# Patient Record
Sex: FEMALE | Race: WHITE
[De-identification: names, ages, dates, MRNs, and addresses within clinical notes are randomized per-mention and may not be internally consistent; named-entity substitution may affect disease eponyms.]

---

## 2017-02-07 ENCOUNTER — PRIOR ORIGINAL RECORDS (OUTPATIENT)
Dept: OTHER | Age: 72
End: 2017-02-07

## 2017-02-14 ENCOUNTER — HOSPITAL ENCOUNTER (OUTPATIENT)
Dept: CV DIAGNOSTICS | Facility: HOSPITAL | Age: 72
Discharge: HOME | End: 2017-02-14
Attending: INTERNAL MEDICINE
Payer: MEDICARE

## 2017-02-14 DIAGNOSIS — R07.9 CHEST PAIN: ICD-10-CM

## 2017-02-14 DIAGNOSIS — I25.10 CAD (CORONARY ARTERY DISEASE): ICD-10-CM

## 2017-02-14 PROCEDURE — 93016 CV STRESS TEST SUPVJ ONLY: CPT | Performed by: INTERNAL MEDICINE

## 2017-02-14 PROCEDURE — 93017 CV STRESS TEST TRACING ONLY: CPT

## 2017-02-14 PROCEDURE — 93350 STRESS TTE ONLY: CPT

## 2017-02-14 PROCEDURE — 93018 CV STRESS TEST I&R ONLY: CPT | Performed by: INTERNAL MEDICINE

## 2017-02-14 PROCEDURE — 93350 STRESS TTE ONLY: CPT | Performed by: INTERNAL MEDICINE

## 2017-02-14 RX ORDER — DOBUTAMINE HYDROCHLORIDE 100 MG/100ML
INJECTION INTRAVENOUS
Status: COMPLETED
Start: 2017-02-14 | End: 2017-02-14

## 2017-02-14 RX ORDER — SODIUM CHLORIDE 9 MG/ML
INJECTION, SOLUTION INTRAVENOUS
Status: COMPLETED
Start: 2017-02-14 | End: 2017-02-14

## 2017-02-14 RX ORDER — 0.9 % SODIUM CHLORIDE 0.9 %
VIAL (ML) INJECTION
Status: COMPLETED
Start: 2017-02-14 | End: 2017-02-14

## 2017-02-14 RX ADMIN — SODIUM CHLORIDE 100 ML: 9 INJECTION, SOLUTION INTRAVENOUS at 14:00:00

## 2017-02-14 RX ADMIN — 0.9 % SODIUM CHLORIDE 100 ML: 0.9 % VIAL (ML) INJECTION at 14:00:00

## 2017-02-14 RX ADMIN — DOBUTAMINE HYDROCHLORIDE 20 MCG/KG/MIN: 100 INJECTION INTRAVENOUS at 14:00:00

## 2017-02-14 NOTE — IMAGING NOTE
3471 - Patient here in cardiac diagnostics. Underwent dobutamine stress echo. Patient became hypotensive in early recovery, SBP 70s. IV bolus 0.9NS given to patient, placed patient in trendelenburg. Patient never lost consiousness.   B/P increased, see stre

## 2017-02-21 ENCOUNTER — PRIOR ORIGINAL RECORDS (OUTPATIENT)
Dept: OTHER | Age: 72
End: 2017-02-21

## 2017-03-13 ENCOUNTER — HOSPITAL ENCOUNTER (OUTPATIENT)
Dept: MAMMOGRAPHY | Age: 72
Discharge: HOME | End: 2017-03-13
Attending: INTERNAL MEDICINE
Payer: MEDICARE

## 2017-03-13 DIAGNOSIS — Z12.31 VISIT FOR SCREENING MAMMOGRAM: ICD-10-CM

## 2017-03-13 PROCEDURE — 77067 SCR MAMMO BI INCL CAD: CPT

## 2017-08-09 ENCOUNTER — PRIOR ORIGINAL RECORDS (OUTPATIENT)
Dept: OTHER | Age: 72
End: 2017-08-09

## 2018-01-11 ENCOUNTER — HOSPITAL ENCOUNTER (OUTPATIENT)
Dept: MAMMOGRAPHY | Age: 73
Discharge: HOME | End: 2018-01-11
Attending: INTERNAL MEDICINE
Payer: MEDICARE

## 2018-01-11 DIAGNOSIS — Z12.39 SCREENING BREAST EXAMINATION: ICD-10-CM

## 2018-02-15 ENCOUNTER — PRIOR ORIGINAL RECORDS (OUTPATIENT)
Dept: OTHER | Age: 73
End: 2018-02-15

## 2018-02-20 ENCOUNTER — LAB ENCOUNTER (OUTPATIENT)
Dept: LAB | Age: 73
End: 2018-02-20
Attending: INTERNAL MEDICINE
Payer: MEDICARE

## 2018-02-20 ENCOUNTER — PRIOR ORIGINAL RECORDS (OUTPATIENT)
Dept: OTHER | Age: 73
End: 2018-02-20

## 2018-02-20 DIAGNOSIS — E78.00 PURE HYPERCHOLESTEROLEMIA: Primary | ICD-10-CM

## 2018-02-20 LAB
ALT SERPL-CCNC: 18 U/L (ref 14–54)
AST SERPL-CCNC: 19 U/L (ref 15–41)
CHOLEST SERPL-MCNC: 146 MG/DL (ref 110–200)
HDLC SERPL-MCNC: 43 MG/DL
LDLC SERPL CALC-MCNC: 86 MG/DL (ref 0–99)
NONHDLC SERPL-MCNC: 103 MG/DL
TRIGL SERPL-MCNC: 87 MG/DL (ref 1–149)

## 2018-02-20 PROCEDURE — 80061 LIPID PANEL: CPT

## 2018-02-20 PROCEDURE — 36415 COLL VENOUS BLD VENIPUNCTURE: CPT

## 2018-02-20 PROCEDURE — 84450 TRANSFERASE (AST) (SGOT): CPT

## 2018-02-20 PROCEDURE — 84460 ALANINE AMINO (ALT) (SGPT): CPT

## 2018-02-21 LAB
ALT (SGPT): 18 U/L
AST (SGOT): 19 U/L
CHOLESTEROL, TOTAL: 146 MG/DL
HDL CHOLESTEROL: 43 MG/DL
LDL CHOLESTEROL: 86 MG/DL
TRIGLYCERIDES: 87 MG/DL

## 2018-02-28 ENCOUNTER — PRIOR ORIGINAL RECORDS (OUTPATIENT)
Dept: OTHER | Age: 73
End: 2018-02-28

## 2018-03-14 ENCOUNTER — HOSPITAL ENCOUNTER (OUTPATIENT)
Dept: MAMMOGRAPHY | Age: 73
Discharge: HOME | End: 2018-03-14
Attending: INTERNAL MEDICINE
Payer: MEDICARE

## 2018-03-14 PROCEDURE — 77067 SCR MAMMO BI INCL CAD: CPT | Performed by: INTERNAL MEDICINE

## 2018-03-15 ENCOUNTER — PRIOR ORIGINAL RECORDS (OUTPATIENT)
Dept: OTHER | Age: 73
End: 2018-03-15

## 2018-04-16 ENCOUNTER — HOSPITAL ENCOUNTER (OUTPATIENT)
Dept: ULTRASOUND IMAGING | Facility: HOSPITAL | Age: 73
Discharge: HOME | End: 2018-04-16
Attending: OBSTETRICS & GYNECOLOGY
Payer: MEDICARE

## 2018-04-16 DIAGNOSIS — N64.4 BREAST PAIN, RIGHT: ICD-10-CM

## 2018-04-16 PROCEDURE — 76642 ULTRASOUND BREAST LIMITED: CPT | Performed by: OBSTETRICS & GYNECOLOGY

## 2018-08-10 LAB
BUN: 20 MG/DL
CALCIUM: 9.2 MG/DL
CHLORIDE: 108 MEQ/L
CREATININE, SERUM: 1.05 MG/DL
GLUCOSE: 138 MG/DL
POTASSIUM, SERUM: 3.9 MEQ/L
SODIUM: 141 MEQ/L

## 2018-08-13 ENCOUNTER — HOSPITAL ENCOUNTER (OUTPATIENT)
Dept: ULTRASOUND IMAGING | Age: 73
Discharge: HOME | End: 2018-08-13
Attending: OBSTETRICS & GYNECOLOGY
Payer: MEDICARE

## 2018-08-13 DIAGNOSIS — R10.2 PELVIC PAIN IN FEMALE: ICD-10-CM

## 2018-08-13 PROCEDURE — 76830 TRANSVAGINAL US NON-OB: CPT | Performed by: OBSTETRICS & GYNECOLOGY

## 2018-08-13 PROCEDURE — 76856 US EXAM PELVIC COMPLETE: CPT | Performed by: OBSTETRICS & GYNECOLOGY

## 2018-08-27 ENCOUNTER — PRIOR ORIGINAL RECORDS (OUTPATIENT)
Dept: OTHER | Age: 73
End: 2018-08-27

## 2018-08-29 ENCOUNTER — HOSPITAL ENCOUNTER (OUTPATIENT)
Dept: CARDIOLOGY CLINIC | Age: 73
Discharge: HOME | End: 2018-08-29
Attending: INTERNAL MEDICINE
Payer: MEDICARE

## 2018-08-29 DIAGNOSIS — I25.10 CAD (CORONARY ARTERY DISEASE): ICD-10-CM

## 2018-08-29 DIAGNOSIS — R94.31 ABNORMAL EKG: ICD-10-CM

## 2018-08-29 PROCEDURE — 93306 TTE W/DOPPLER COMPLETE: CPT | Performed by: INTERNAL MEDICINE

## 2018-08-31 ENCOUNTER — HOSPITAL ENCOUNTER (OUTPATIENT)
Dept: NUCLEAR MEDICINE | Facility: HOSPITAL | Age: 73
Discharge: HOME | End: 2018-08-31
Attending: INTERNAL MEDICINE
Payer: MEDICARE

## 2018-08-31 ENCOUNTER — HOSPITAL ENCOUNTER (OUTPATIENT)
Dept: CV DIAGNOSTICS | Facility: HOSPITAL | Age: 73
Discharge: HOME | End: 2018-08-31
Attending: INTERNAL MEDICINE
Payer: MEDICARE

## 2018-08-31 DIAGNOSIS — Z01.818 PRE-OP EVALUATION: ICD-10-CM

## 2018-08-31 DIAGNOSIS — I25.10 CAD (CORONARY ARTERY DISEASE): ICD-10-CM

## 2018-08-31 DIAGNOSIS — R94.31 ABNORMAL EKG: ICD-10-CM

## 2018-08-31 PROCEDURE — 93018 CV STRESS TEST I&R ONLY: CPT | Performed by: INTERNAL MEDICINE

## 2018-08-31 PROCEDURE — 93016 CV STRESS TEST SUPVJ ONLY: CPT | Performed by: INTERNAL MEDICINE

## 2018-08-31 PROCEDURE — A4216 STERILE WATER/SALINE, 10 ML: HCPCS

## 2018-08-31 PROCEDURE — 78452 HT MUSCLE IMAGE SPECT MULT: CPT | Performed by: INTERNAL MEDICINE

## 2018-08-31 PROCEDURE — 93017 CV STRESS TEST TRACING ONLY: CPT | Performed by: INTERNAL MEDICINE

## 2018-08-31 RX ORDER — 0.9 % SODIUM CHLORIDE 0.9 %
VIAL (ML) INJECTION
Status: COMPLETED
Start: 2018-08-31 | End: 2018-08-31

## 2018-08-31 RX ADMIN — 0.9 % SODIUM CHLORIDE: 0.9 % VIAL (ML) INJECTION at 13:33:00

## 2018-09-05 ENCOUNTER — MYAURORA ACCOUNT LINK (OUTPATIENT)
Dept: OTHER | Age: 73
End: 2018-09-05

## 2018-09-05 ENCOUNTER — PRIOR ORIGINAL RECORDS (OUTPATIENT)
Dept: OTHER | Age: 73
End: 2018-09-05

## 2018-09-05 ENCOUNTER — HOSPITAL ENCOUNTER (OUTPATIENT)
Dept: GENERAL RADIOLOGY | Facility: HOSPITAL | Age: 73
Discharge: HOME | End: 2018-09-05
Attending: INTERNAL MEDICINE
Payer: MEDICARE

## 2018-09-05 DIAGNOSIS — R07.9 CHEST PAIN: ICD-10-CM

## 2018-09-05 LAB
ANION GAP SERPL CALC-SCNC: 6 MMOL/L (ref 0–18)
BASOPHILS # BLD: 0.1 K/UL (ref 0–0.2)
BASOPHILS NFR BLD: 1 %
BUN SERPL-MCNC: 7 MG/DL (ref 8–20)
BUN/CREAT SERPL: 9.9 (ref 10–20)
CALCIUM SERPL-MCNC: 9.5 MG/DL (ref 8.5–10.5)
CHLORIDE SERPL-SCNC: 105 MMOL/L (ref 95–110)
CO2 SERPL-SCNC: 29 MMOL/L (ref 22–32)
CREAT SERPL-MCNC: 0.71 MG/DL (ref 0.5–1.5)
EOSINOPHIL # BLD: 0.2 K/UL (ref 0–0.7)
EOSINOPHIL NFR BLD: 2 %
ERYTHROCYTE [DISTWIDTH] IN BLOOD BY AUTOMATED COUNT: 13.8 % (ref 11–15)
GLUCOSE SERPL-MCNC: 89 MG/DL (ref 70–99)
HCT VFR BLD AUTO: 44.4 % (ref 35–48)
HGB BLD-MCNC: 14.5 G/DL (ref 12–16)
LYMPHOCYTES # BLD: 2.2 K/UL (ref 1–4)
LYMPHOCYTES NFR BLD: 29 %
MCH RBC QN AUTO: 28.8 PG (ref 27–32)
MCHC RBC AUTO-ENTMCNC: 32.6 G/DL (ref 32–37)
MCV RBC AUTO: 88.5 FL (ref 80–100)
MONOCYTES # BLD: 0.7 K/UL (ref 0–1)
MONOCYTES NFR BLD: 10 %
NEUTROPHILS # BLD AUTO: 4.3 K/UL (ref 1.8–7.7)
NEUTROPHILS NFR BLD: 58 %
OSMOLALITY UR CALC.SUM OF ELEC: 287 MOSM/KG (ref 275–295)
PLATELET # BLD AUTO: 243 K/UL (ref 140–400)
PMV BLD AUTO: 8.5 FL (ref 7.4–10.3)
POTASSIUM SERPL-SCNC: 4.2 MMOL/L (ref 3.3–5.1)
RBC # BLD AUTO: 5.02 M/UL (ref 3.7–5.4)
SODIUM SERPL-SCNC: 140 MMOL/L (ref 136–144)
WBC # BLD AUTO: 7.4 K/UL (ref 4–11)

## 2018-09-05 PROCEDURE — 71046 X-RAY EXAM CHEST 2 VIEWS: CPT | Performed by: INTERNAL MEDICINE

## 2018-09-05 PROCEDURE — 85025 COMPLETE CBC W/AUTO DIFF WBC: CPT | Performed by: INTERNAL MEDICINE

## 2018-09-05 PROCEDURE — 36415 COLL VENOUS BLD VENIPUNCTURE: CPT | Performed by: INTERNAL MEDICINE

## 2018-09-05 PROCEDURE — 80048 BASIC METABOLIC PNL TOTAL CA: CPT | Performed by: INTERNAL MEDICINE

## 2018-09-06 ENCOUNTER — PRIOR ORIGINAL RECORDS (OUTPATIENT)
Dept: OTHER | Age: 73
End: 2018-09-06

## 2018-09-06 LAB
BUN: 7 MG/DL
CALCIUM: 9.5 MG/DL
CHLORIDE: 105 MEQ/L
CREATININE, SERUM: 0.71 MG/DL
GLUCOSE: 89 MG/DL
HEMATOCRIT: 44.4 %
HEMOGLOBIN: 14.5 G/DL
MCH: 28.8 PG
MCHC: 32.6 G/DL
MCV: 88.5 FL
PLATELETS: 243 K/UL
POTASSIUM, SERUM: 4.2 MEQ/L
RED BLOOD COUNT: 5.02 X 10-6/U
SODIUM: 140 MEQ/L
WHITE BLOOD COUNT: 7.4 X 10-3/U

## 2018-09-07 RX ORDER — ACETAMINOPHEN 325 MG/1
325 TABLET ORAL EVERY 6 HOURS PRN
COMMUNITY

## 2018-09-07 RX ORDER — ASPIRIN 81 MG/1
324 TABLET, CHEWABLE ORAL DAILY
COMMUNITY

## 2018-09-07 RX ORDER — GABAPENTIN 400 MG/1
800 CAPSULE ORAL DAILY
COMMUNITY

## 2018-09-10 ENCOUNTER — HOSPITAL ENCOUNTER (OUTPATIENT)
Dept: INTERVENTIONAL RADIOLOGY/VASCULAR | Facility: HOSPITAL | Age: 73
Discharge: HOME | End: 2018-09-10
Attending: INTERNAL MEDICINE | Admitting: INTERNAL MEDICINE
Payer: MEDICARE

## 2018-09-10 VITALS
BODY MASS INDEX: 39.07 KG/M2 | RESPIRATION RATE: 15 BRPM | HEART RATE: 72 BPM | OXYGEN SATURATION: 95 % | HEIGHT: 60 IN | WEIGHT: 199 LBS | SYSTOLIC BLOOD PRESSURE: 151 MMHG | DIASTOLIC BLOOD PRESSURE: 83 MMHG

## 2018-09-10 DIAGNOSIS — R94.39 ABNORMAL STRESS TEST: ICD-10-CM

## 2018-09-10 PROCEDURE — 99152 MOD SED SAME PHYS/QHP 5/>YRS: CPT

## 2018-09-10 PROCEDURE — 4A023N7 MEASUREMENT OF CARDIAC SAMPLING AND PRESSURE, LEFT HEART, PERCUTANEOUS APPROACH: ICD-10-PCS | Performed by: INTERNAL MEDICINE

## 2018-09-10 PROCEDURE — B2111ZZ FLUOROSCOPY OF MULTIPLE CORONARY ARTERIES USING LOW OSMOLAR CONTRAST: ICD-10-PCS | Performed by: INTERNAL MEDICINE

## 2018-09-10 PROCEDURE — 93458 L HRT ARTERY/VENTRICLE ANGIO: CPT

## 2018-09-10 PROCEDURE — B2151ZZ FLUOROSCOPY OF LEFT HEART USING LOW OSMOLAR CONTRAST: ICD-10-PCS | Performed by: INTERNAL MEDICINE

## 2018-09-10 RX ORDER — HEPARIN SODIUM 1000 [USP'U]/ML
INJECTION, SOLUTION INTRAVENOUS; SUBCUTANEOUS
Status: COMPLETED
Start: 2018-09-10 | End: 2018-09-10

## 2018-09-10 RX ORDER — LIDOCAINE HYDROCHLORIDE 20 MG/ML
INJECTION, SOLUTION EPIDURAL; INFILTRATION; INTRACAUDAL; PERINEURAL
Status: COMPLETED
Start: 2018-09-10 | End: 2018-09-10

## 2018-09-10 RX ORDER — MIDAZOLAM HYDROCHLORIDE 1 MG/ML
INJECTION INTRAMUSCULAR; INTRAVENOUS
Status: COMPLETED
Start: 2018-09-10 | End: 2018-09-10

## 2018-09-10 RX ORDER — SODIUM CHLORIDE 9 MG/ML
INJECTION, SOLUTION INTRAVENOUS CONTINUOUS
Status: DISCONTINUED | OUTPATIENT
Start: 2018-09-10 | End: 2018-09-10

## 2018-09-10 RX ORDER — SODIUM CHLORIDE 9 MG/ML
INJECTION, SOLUTION INTRAVENOUS
Status: DISCONTINUED
Start: 2018-09-10 | End: 2018-09-10

## 2018-09-10 NOTE — BRIEF PROCEDURE NOTE
[facility] - [facility]    Brief Cardiac Cath Note  Afrodlucille Villar Patient Status:  Outpatient in a Bed    7/15/1945 MRN [id number]   Location [facility] Attending Katrin Krishnamurthy MD   Hosp Day # 0 PCP Fely Cohen

## 2018-09-20 ENCOUNTER — PRIOR ORIGINAL RECORDS (OUTPATIENT)
Dept: OTHER | Age: 73
End: 2018-09-20

## 2018-09-20 ENCOUNTER — MYAURORA ACCOUNT LINK (OUTPATIENT)
Dept: OTHER | Age: 73
End: 2018-09-20

## 2018-09-21 ENCOUNTER — PRIOR ORIGINAL RECORDS (OUTPATIENT)
Dept: OTHER | Age: 73
End: 2018-09-21

## 2018-09-24 ENCOUNTER — PRIOR ORIGINAL RECORDS (OUTPATIENT)
Dept: OTHER | Age: 73
End: 2018-09-24

## 2018-10-20 ENCOUNTER — PRIOR ORIGINAL RECORDS (OUTPATIENT)
Dept: OTHER | Age: 73
End: 2018-10-20

## 2018-10-20 ENCOUNTER — LAB ENCOUNTER (OUTPATIENT)
Dept: LAB | Age: 73
End: 2018-10-20
Attending: INTERNAL MEDICINE
Payer: MEDICARE

## 2018-10-20 DIAGNOSIS — E78.2 MIXED HYPERLIPIDEMIA: ICD-10-CM

## 2018-10-20 DIAGNOSIS — I10 ESSENTIAL HYPERTENSION, MALIGNANT: Primary | ICD-10-CM

## 2018-10-20 PROCEDURE — 36415 COLL VENOUS BLD VENIPUNCTURE: CPT

## 2018-10-20 PROCEDURE — 84450 TRANSFERASE (AST) (SGOT): CPT

## 2018-10-20 PROCEDURE — 80061 LIPID PANEL: CPT

## 2018-10-20 PROCEDURE — 84460 ALANINE AMINO (ALT) (SGPT): CPT

## 2018-10-22 LAB
ALT (SGPT): 20 U/L
AST (SGOT): 18 U/L
CHOLESTEROL, TOTAL: 134 MG/DL
HDL CHOLESTEROL: 36 MG/DL
LDL CHOLESTEROL: 80 MG/DL
TRIGLYCERIDES: 89 MG/DL

## 2018-11-05 ENCOUNTER — PRIOR ORIGINAL RECORDS (OUTPATIENT)
Dept: OTHER | Age: 73
End: 2018-11-05

## 2019-02-26 ENCOUNTER — PRIOR ORIGINAL RECORDS (OUTPATIENT)
Dept: OTHER | Age: 74
End: 2019-02-26

## 2019-02-28 VITALS
HEART RATE: 70 BPM | SYSTOLIC BLOOD PRESSURE: 122 MMHG | RESPIRATION RATE: 18 BRPM | DIASTOLIC BLOOD PRESSURE: 80 MMHG | HEIGHT: 60 IN | BODY MASS INDEX: 38.87 KG/M2 | WEIGHT: 198 LBS

## 2019-02-28 VITALS
HEIGHT: 60 IN | WEIGHT: 186 LBS | DIASTOLIC BLOOD PRESSURE: 62 MMHG | HEART RATE: 63 BPM | OXYGEN SATURATION: 98 % | BODY MASS INDEX: 36.52 KG/M2 | SYSTOLIC BLOOD PRESSURE: 108 MMHG

## 2019-02-28 VITALS
HEIGHT: 60 IN | WEIGHT: 200 LBS | DIASTOLIC BLOOD PRESSURE: 66 MMHG | HEART RATE: 87 BPM | OXYGEN SATURATION: 97 % | SYSTOLIC BLOOD PRESSURE: 118 MMHG | BODY MASS INDEX: 39.27 KG/M2

## 2019-02-28 VITALS
DIASTOLIC BLOOD PRESSURE: 70 MMHG | WEIGHT: 195 LBS | SYSTOLIC BLOOD PRESSURE: 112 MMHG | HEART RATE: 68 BPM | BODY MASS INDEX: 38.28 KG/M2 | RESPIRATION RATE: 18 BRPM | HEIGHT: 60 IN

## 2019-02-28 VITALS
BODY MASS INDEX: 39.07 KG/M2 | HEIGHT: 60 IN | DIASTOLIC BLOOD PRESSURE: 66 MMHG | SYSTOLIC BLOOD PRESSURE: 114 MMHG | WEIGHT: 199 LBS | OXYGEN SATURATION: 96 % | HEART RATE: 88 BPM

## 2019-03-01 VITALS
HEIGHT: 60 IN | BODY MASS INDEX: 36.32 KG/M2 | DIASTOLIC BLOOD PRESSURE: 69 MMHG | HEART RATE: 72 BPM | RESPIRATION RATE: 18 BRPM | SYSTOLIC BLOOD PRESSURE: 110 MMHG | WEIGHT: 185 LBS

## 2019-03-07 VITALS
SYSTOLIC BLOOD PRESSURE: 112 MMHG | WEIGHT: 195 LBS | HEIGHT: 60 IN | DIASTOLIC BLOOD PRESSURE: 76 MMHG | BODY MASS INDEX: 38.28 KG/M2 | HEART RATE: 85 BPM

## 2019-04-10 RX ORDER — TOCOPHERSOLAN (VITAMIN E TPGS) 400/15ML
LIQUID (ML) ORAL DAILY
COMMUNITY

## 2019-04-10 RX ORDER — GABAPENTIN 800 MG/1
800 TABLET ORAL
COMMUNITY

## 2019-04-10 RX ORDER — ATORVASTATIN CALCIUM 20 MG/1
TABLET, FILM COATED ORAL
COMMUNITY

## 2019-04-10 RX ORDER — ACETAMINOPHEN 325 MG/1
325 TABLET ORAL 3 TIMES DAILY
COMMUNITY

## 2019-04-10 RX ORDER — TEMAZEPAM 15 MG/1
CAPSULE ORAL
COMMUNITY
End: 2020-06-10

## 2019-06-28 ENCOUNTER — HOSPITAL ENCOUNTER (OUTPATIENT)
Dept: MAMMOGRAPHY | Age: 74
Discharge: HOME | End: 2019-06-28
Attending: INTERNAL MEDICINE
Payer: MEDICARE

## 2019-06-28 DIAGNOSIS — Z12.31 BREAST CANCER SCREENING BY MAMMOGRAM: ICD-10-CM

## 2019-06-28 PROCEDURE — 77067 SCR MAMMO BI INCL CAD: CPT | Performed by: INTERNAL MEDICINE

## 2019-06-28 PROCEDURE — 77063 BREAST TOMOSYNTHESIS BI: CPT | Performed by: INTERNAL MEDICINE

## 2019-08-13 ENCOUNTER — APPOINTMENT (OUTPATIENT)
Dept: CARDIOLOGY | Age: 74
End: 2019-08-13

## 2019-09-17 PROBLEM — E78.00 PURE HYPERCHOLESTEROLEMIA: Status: ACTIVE | Noted: 2019-09-17

## 2019-09-17 PROBLEM — E66.9 OBESITY: Status: ACTIVE | Noted: 2019-09-17

## 2019-09-17 PROBLEM — Z95.828 S/P INSERTION OF ILIAC ARTERY STENT: Status: ACTIVE | Noted: 2019-09-17

## 2019-09-17 PROBLEM — I25.10 CAD (CORONARY ARTERY DISEASE): Status: ACTIVE | Noted: 2019-09-17

## 2019-09-17 RX ORDER — LATANOPROST 50 UG/ML
0.01 SOLUTION/ DROPS OPHTHALMIC AT BEDTIME
COMMUNITY
Start: 2017-08-09

## 2019-09-18 ENCOUNTER — OFFICE VISIT (OUTPATIENT)
Dept: CARDIOLOGY | Age: 74
End: 2019-09-18

## 2019-09-18 VITALS
HEART RATE: 78 BPM | DIASTOLIC BLOOD PRESSURE: 68 MMHG | OXYGEN SATURATION: 96 % | WEIGHT: 188 LBS | BODY MASS INDEX: 36.91 KG/M2 | SYSTOLIC BLOOD PRESSURE: 108 MMHG | HEIGHT: 60 IN

## 2019-09-18 DIAGNOSIS — I25.10 CORONARY ARTERY DISEASE INVOLVING NATIVE CORONARY ARTERY OF NATIVE HEART WITHOUT ANGINA PECTORIS: Primary | ICD-10-CM

## 2019-09-18 DIAGNOSIS — E78.00 PURE HYPERCHOLESTEROLEMIA: ICD-10-CM

## 2019-09-18 PROCEDURE — 99214 OFFICE O/P EST MOD 30 MIN: CPT | Performed by: INTERNAL MEDICINE

## 2019-09-18 SDOH — HEALTH STABILITY: MENTAL HEALTH: HOW OFTEN DO YOU HAVE A DRINK CONTAINING ALCOHOL?: NEVER

## 2019-09-18 ASSESSMENT — PATIENT HEALTH QUESTIONNAIRE - PHQ9
2. FEELING DOWN, DEPRESSED OR HOPELESS: NOT AT ALL
SUM OF ALL RESPONSES TO PHQ9 QUESTIONS 1 AND 2: 0
1. LITTLE INTEREST OR PLEASURE IN DOING THINGS: NOT AT ALL
SUM OF ALL RESPONSES TO PHQ9 QUESTIONS 1 AND 2: 0

## 2020-03-25 ENCOUNTER — APPOINTMENT (OUTPATIENT)
Dept: CARDIOLOGY | Age: 75
End: 2020-03-25

## 2020-05-01 ENCOUNTER — ADVANCED DIRECTIVES (OUTPATIENT)
Dept: HEALTH INFORMATION MANAGEMENT | Age: 75
End: 2020-05-01

## 2020-06-03 ENCOUNTER — APPOINTMENT (OUTPATIENT)
Dept: CARDIOLOGY | Age: 75
End: 2020-06-03

## 2020-06-10 ENCOUNTER — OFFICE VISIT (OUTPATIENT)
Dept: CARDIOLOGY | Age: 75
End: 2020-06-10

## 2020-06-10 VITALS
DIASTOLIC BLOOD PRESSURE: 80 MMHG | RESPIRATION RATE: 18 BRPM | HEIGHT: 60 IN | SYSTOLIC BLOOD PRESSURE: 120 MMHG | WEIGHT: 191 LBS | BODY MASS INDEX: 37.5 KG/M2 | HEART RATE: 71 BPM | OXYGEN SATURATION: 95 %

## 2020-06-10 DIAGNOSIS — I25.10 CORONARY ARTERY DISEASE INVOLVING NATIVE CORONARY ARTERY OF NATIVE HEART WITHOUT ANGINA PECTORIS: Primary | ICD-10-CM

## 2020-06-10 DIAGNOSIS — E78.00 PURE HYPERCHOLESTEROLEMIA: ICD-10-CM

## 2020-06-10 PROCEDURE — 99214 OFFICE O/P EST MOD 30 MIN: CPT | Performed by: INTERNAL MEDICINE

## 2020-06-10 SDOH — HEALTH STABILITY: MENTAL HEALTH: HOW OFTEN DO YOU HAVE A DRINK CONTAINING ALCOHOL?: NEVER

## 2020-06-10 ASSESSMENT — PATIENT HEALTH QUESTIONNAIRE - PHQ9
1. LITTLE INTEREST OR PLEASURE IN DOING THINGS: NOT AT ALL
2. FEELING DOWN, DEPRESSED OR HOPELESS: NOT AT ALL
CLINICAL INTERPRETATION OF PHQ2 SCORE: NO FURTHER SCREENING NEEDED
CLINICAL INTERPRETATION OF PHQ9 SCORE: NO FURTHER SCREENING NEEDED
SUM OF ALL RESPONSES TO PHQ9 QUESTIONS 1 AND 2: 0
SUM OF ALL RESPONSES TO PHQ9 QUESTIONS 1 AND 2: 0

## 2020-07-09 ENCOUNTER — HOSPITAL ENCOUNTER (OUTPATIENT)
Dept: MAMMOGRAPHY | Age: 75
Discharge: HOME | End: 2020-07-09
Attending: INTERNAL MEDICINE
Payer: MEDICARE

## 2020-07-09 ENCOUNTER — LAB ENCOUNTER (OUTPATIENT)
Dept: LAB | Age: 75
End: 2020-07-09
Attending: INTERNAL MEDICINE
Payer: MEDICARE

## 2020-07-09 ENCOUNTER — HOSPITAL ENCOUNTER (OUTPATIENT)
Dept: BONE DENSITY | Age: 75
Discharge: HOME | End: 2020-07-09
Attending: INTERNAL MEDICINE
Payer: MEDICARE

## 2020-07-09 DIAGNOSIS — I10 ESSENTIAL HYPERTENSION, MALIGNANT: Primary | ICD-10-CM

## 2020-07-09 DIAGNOSIS — M81.0 SENILE OSTEOPOROSIS: ICD-10-CM

## 2020-07-09 DIAGNOSIS — I25.10 CORONARY ATHEROSCLEROSIS OF NATIVE CORONARY ARTERY: ICD-10-CM

## 2020-07-09 DIAGNOSIS — Z12.31 BREAST CANCER SCREENING BY MAMMOGRAM: ICD-10-CM

## 2020-07-09 LAB
ALBUMIN SERPL-MCNC: 4.1 G/DL (ref 3.4–5)
ALBUMIN/GLOB SERPL: 1.2 {RATIO} (ref 1–2)
ALP LIVER SERPL-CCNC: 76 U/L (ref 55–142)
ALT SERPL-CCNC: 24 U/L (ref 13–56)
ANION GAP SERPL CALC-SCNC: 4 MMOL/L (ref 0–18)
AST SERPL-CCNC: 14 U/L (ref 15–37)
BACTERIA UR QL AUTO: NEGATIVE /HPF
BASOPHILS # BLD AUTO: 0.07 X10(3) UL (ref 0–0.2)
BASOPHILS NFR BLD AUTO: 1 %
BILIRUB SERPL-MCNC: 0.6 MG/DL (ref 0.1–2)
BILIRUB UR QL: NEGATIVE
BUN BLD-MCNC: 14 MG/DL (ref 7–18)
BUN/CREAT SERPL: 20.6 (ref 10–20)
CALCIUM BLD-MCNC: 9.3 MG/DL (ref 8.5–10.1)
CHLORIDE SERPL-SCNC: 108 MMOL/L (ref 98–112)
CHOLEST SMN-MCNC: 147 MG/DL (ref ?–200)
CLARITY UR: CLEAR
CO2 SERPL-SCNC: 30 MMOL/L (ref 21–32)
COLOR UR: YELLOW
CREAT BLD-MCNC: 0.68 MG/DL (ref 0.55–1.02)
DEPRECATED RDW RBC AUTO: 44.1 FL (ref 35.1–46.3)
EOSINOPHIL # BLD AUTO: 0.13 X10(3) UL (ref 0–0.7)
EOSINOPHIL NFR BLD AUTO: 1.8 %
ERYTHROCYTE [DISTWIDTH] IN BLOOD BY AUTOMATED COUNT: 13 % (ref 11–15)
GLOBULIN PLAS-MCNC: 3.4 G/DL (ref 2.8–4.4)
GLUCOSE BLD-MCNC: 89 MG/DL (ref 70–99)
GLUCOSE UR-MCNC: NEGATIVE MG/DL
HCT VFR BLD AUTO: 45.5 % (ref 35–48)
HDLC SERPL-MCNC: 48 MG/DL (ref 40–59)
HGB BLD-MCNC: 14.7 G/DL (ref 12–16)
IMM GRANULOCYTES # BLD AUTO: 0.04 X10(3) UL (ref 0–1)
IMM GRANULOCYTES NFR BLD: 0.6 %
KETONES UR-MCNC: NEGATIVE MG/DL
LDLC SERPL CALC-MCNC: 83 MG/DL (ref ?–100)
LYMPHOCYTES # BLD AUTO: 2.28 X10(3) UL (ref 1–4)
LYMPHOCYTES NFR BLD AUTO: 32.3 %
M PROTEIN MFR SERPL ELPH: 7.5 G/DL (ref 6.4–8.2)
MCH RBC QN AUTO: 30 PG (ref 26–34)
MCHC RBC AUTO-ENTMCNC: 32.3 G/DL (ref 31–37)
MCV RBC AUTO: 92.9 FL (ref 80–100)
MONOCYTES # BLD AUTO: 0.72 X10(3) UL (ref 0.1–1)
MONOCYTES NFR BLD AUTO: 10.2 %
NEUTROPHILS # BLD AUTO: 3.81 X10 (3) UL (ref 1.5–7.7)
NEUTROPHILS # BLD AUTO: 3.81 X10(3) UL (ref 1.5–7.7)
NEUTROPHILS NFR BLD AUTO: 54.1 %
NITRITE UR QL STRIP.AUTO: NEGATIVE
NONHDLC SERPL-MCNC: 99 MG/DL (ref ?–130)
OSMOLALITY SERPL CALC.SUM OF ELEC: 294 MOSM/KG (ref 275–295)
PATIENT FASTING Y/N/NP: YES
PATIENT FASTING Y/N/NP: YES
PH UR: 5 [PH] (ref 5–8)
PLATELET # BLD AUTO: 284 10(3)UL (ref 150–450)
POTASSIUM SERPL-SCNC: 4 MMOL/L (ref 3.5–5.1)
PROT UR-MCNC: NEGATIVE MG/DL
RBC # BLD AUTO: 4.9 X10(6)UL (ref 3.8–5.3)
RBC #/AREA URNS AUTO: 3 /HPF
SODIUM SERPL-SCNC: 142 MMOL/L (ref 136–145)
SP GR UR STRIP: 1.01 (ref 1–1.03)
TRIGL SERPL-MCNC: 80 MG/DL (ref 30–149)
UROBILINOGEN UR STRIP-ACNC: <2
VLDLC SERPL CALC-MCNC: 16 MG/DL (ref 0–30)
WBC # BLD AUTO: 7.1 X10(3) UL (ref 4–11)
WBC #/AREA URNS AUTO: 3 /HPF

## 2020-07-09 PROCEDURE — 77067 SCR MAMMO BI INCL CAD: CPT | Performed by: INTERNAL MEDICINE

## 2020-07-09 PROCEDURE — 77080 DXA BONE DENSITY AXIAL: CPT | Performed by: INTERNAL MEDICINE

## 2020-07-09 PROCEDURE — 85025 COMPLETE CBC W/AUTO DIFF WBC: CPT

## 2020-07-09 PROCEDURE — 80053 COMPREHEN METABOLIC PANEL: CPT

## 2020-07-09 PROCEDURE — 80061 LIPID PANEL: CPT

## 2020-07-09 PROCEDURE — 81001 URINALYSIS AUTO W/SCOPE: CPT

## 2020-07-09 PROCEDURE — 77063 BREAST TOMOSYNTHESIS BI: CPT | Performed by: INTERNAL MEDICINE

## 2020-07-09 PROCEDURE — 36415 COLL VENOUS BLD VENIPUNCTURE: CPT

## 2020-08-26 ENCOUNTER — LAB ENCOUNTER (OUTPATIENT)
Dept: LAB | Age: 75
End: 2020-08-26
Attending: INTERNAL MEDICINE
Payer: MEDICARE

## 2020-08-26 DIAGNOSIS — R31.9 HEMATURIA, UNSPECIFIED: Primary | ICD-10-CM

## 2020-08-26 LAB
BILIRUB UR QL: NEGATIVE
COLOR UR: YELLOW
GLUCOSE UR-MCNC: NEGATIVE MG/DL
KETONES UR-MCNC: NEGATIVE MG/DL
NITRITE UR QL STRIP.AUTO: POSITIVE
PH UR: 6 [PH] (ref 5–8)
PROT UR-MCNC: 100 MG/DL
RBC #/AREA URNS AUTO: 6 /HPF
SP GR UR STRIP: 1.02 (ref 1–1.03)
UROBILINOGEN UR STRIP-ACNC: <2
WBC #/AREA URNS AUTO: 40 /HPF

## 2020-08-26 PROCEDURE — 81001 URINALYSIS AUTO W/SCOPE: CPT

## 2020-08-26 PROCEDURE — 88108 CYTOPATH CONCENTRATE TECH: CPT

## 2021-02-06 ENCOUNTER — LAB ENCOUNTER (OUTPATIENT)
Dept: LAB | Age: 76
End: 2021-02-06
Attending: INTERNAL MEDICINE
Payer: MEDICARE

## 2021-02-06 DIAGNOSIS — I10 HYPERTENSION: Primary | ICD-10-CM

## 2021-02-06 DIAGNOSIS — E66.9 OBESITY: ICD-10-CM

## 2021-02-06 LAB
ALBUMIN SERPL-MCNC: 3.8 G/DL
ALBUMIN SERPL-MCNC: 3.8 G/DL (ref 3.4–5)
ALBUMIN/GLOB SERPL: 1.1 {RATIO}
ALBUMIN/GLOB SERPL: 1.1 {RATIO} (ref 1–2)
ALP LIVER SERPL-CCNC: 72 U/L
ALP SERPL-CCNC: 72 U/L
ALT SERPL-CCNC: 18 U/L
ALT SERPL-CCNC: 18 UNITS/L
ANION GAP SERPL CALC-SCNC: 7 MMOL/L
ANION GAP SERPL CALC-SCNC: 7 MMOL/L (ref 0–18)
AST SERPL-CCNC: 12 U/L (ref 15–37)
AST SERPL-CCNC: 12 UNITS/L
BASOPHILS # BLD AUTO: 0.09 X10(3) UL (ref 0–0.2)
BASOPHILS NFR BLD AUTO: 1.2 %
BILIRUB SERPL-MCNC: 0.5 MG/DL
BILIRUB SERPL-MCNC: 0.5 MG/DL (ref 0.1–2)
BILIRUB UR QL: NEGATIVE
BUN BLD-MCNC: 14 MG/DL (ref 7–18)
BUN SERPL-MCNC: 14 MG/DL
BUN/CREAT SERPL: 23
BUN/CREAT SERPL: 23 (ref 10–20)
CALCIUM BLD-MCNC: 9.3 MG/DL (ref 8.5–10.1)
CALCIUM SERPL-MCNC: 9.3 MG/DL
CHLORIDE SERPL-SCNC: 110 MMOL/L
CHLORIDE SERPL-SCNC: 110 MMOL/L (ref 98–112)
CHOLEST SERPL-MCNC: 125 MG/DL
CHOLEST SMN-MCNC: 125 MG/DL (ref ?–200)
CO2 SERPL-SCNC: 25 MMOL/L
CO2 SERPL-SCNC: 25 MMOL/L (ref 21–32)
COLOR UR: YELLOW
CREAT BLD-MCNC: 0.61 MG/DL
CREAT SERPL-MCNC: 0.61 MG/DL
DEPRECATED RDW RBC AUTO: 42.7 FL (ref 35.1–46.3)
EOSINOPHIL # BLD AUTO: 0.14 X10(3) UL (ref 0–0.7)
EOSINOPHIL NFR BLD AUTO: 1.9 %
ERYTHROCYTE [DISTWIDTH] IN BLOOD BY AUTOMATED COUNT: 12.7 % (ref 11–15)
EST. AVERAGE GLUCOSE BLD GHB EST-MCNC: 120 MG/DL (ref 68–126)
GLOBULIN PLAS-MCNC: 3.4 G/DL (ref 2.8–4.4)
GLOBULIN SER-MCNC: 3.4 G/DL
GLUCOSE BLD-MCNC: 102 MG/DL (ref 70–99)
GLUCOSE SERPL-MCNC: 102 MG/DL
GLUCOSE UR-MCNC: NEGATIVE MG/DL
HBA1C MFR BLD HPLC: 5.8 % (ref ?–5.7)
HCT VFR BLD AUTO: 45.8 %
HCT VFR BLD CALC: 45.8 %
HDLC SERPL-MCNC: 39 MG/DL
HDLC SERPL-MCNC: 39 MG/DL (ref 40–59)
HGB BLD-MCNC: 14.5 G/DL
HGB BLD-MCNC: 14.5 G/DL
IMM GRANULOCYTES # BLD AUTO: 0.02 X10(3) UL (ref 0–1)
IMM GRANULOCYTES NFR BLD: 0.3 %
KETONES UR-MCNC: NEGATIVE MG/DL
LDLC SERPL CALC-MCNC: 66 MG/DL
LDLC SERPL CALC-MCNC: 66 MG/DL (ref ?–100)
LENGTH OF FAST TIME PATIENT: YES H
LENGTH OF FAST TIME PATIENT: YES H
LYMPHOCYTES # BLD AUTO: 2.45 X10(3) UL (ref 1–4)
LYMPHOCYTES NFR BLD AUTO: 33.1 %
M PROTEIN MFR SERPL ELPH: 7.2 G/DL (ref 6.4–8.2)
MCH RBC QN AUTO: 28.9 PG
MCH RBC QN AUTO: 28.9 PG (ref 26–34)
MCHC RBC AUTO-ENTMCNC: 31.7 G/DL
MCHC RBC AUTO-ENTMCNC: 31.7 G/DL (ref 31–37)
MCV RBC AUTO: 91.4 FL
MCV RBC AUTO: 91.4 FL
MONOCYTES # BLD AUTO: 0.69 X10(3) UL (ref 0.1–1)
MONOCYTES NFR BLD AUTO: 9.3 %
NEUTROPHILS # BLD AUTO: 4.02 X10 (3) UL (ref 1.5–7.7)
NEUTROPHILS # BLD AUTO: 4.02 X10(3) UL (ref 1.5–7.7)
NEUTROPHILS NFR BLD AUTO: 54.2 %
NITRITE UR QL STRIP.AUTO: POSITIVE
NONHDLC SERPL-MCNC: 86 MG/DL
NONHDLC SERPL-MCNC: 86 MG/DL (ref ?–130)
OSMOLALITY SERPL CALC.SUM OF ELEC: 295 MOSM/KG (ref 275–295)
PATIENT FASTING Y/N/NP: YES
PATIENT FASTING Y/N/NP: YES
PH UR: 6 [PH] (ref 5–8)
PLATELET # BLD AUTO: 268 10(3)UL (ref 150–450)
PLATELET # BLD: 268 K/MCL
POTASSIUM SERPL-SCNC: 4.1 MMOL/L
POTASSIUM SERPL-SCNC: 4.1 MMOL/L (ref 3.5–5.1)
PROT SERPL-MCNC: 7.2 G/DL
PROT UR-MCNC: NEGATIVE MG/DL
RBC # BLD AUTO: 5.01 X10(6)UL
RBC # BLD: 5.01 10*6/UL
RBC #/AREA URNS AUTO: 16 /HPF
SODIUM SERPL-SCNC: 142 MMOL/L
SODIUM SERPL-SCNC: 142 MMOL/L (ref 136–145)
SP GR UR STRIP: 1.02 (ref 1–1.03)
TRIGL SERPL-MCNC: 100 MG/DL
TRIGL SERPL-MCNC: 100 MG/DL (ref 30–149)
TSI SER-ACNC: 2.67 MIU/ML (ref 0.36–3.74)
UROBILINOGEN UR STRIP-ACNC: <2
VLDLC SERPL CALC-MCNC: 20 MG/DL
VLDLC SERPL CALC-MCNC: 20 MG/DL (ref 0–30)
WBC # BLD AUTO: 7.4 X10(3) UL (ref 4–11)
WBC # BLD: 7.4 K/MCL
WBC #/AREA URNS AUTO: 227 /HPF

## 2021-02-06 PROCEDURE — 83036 HEMOGLOBIN GLYCOSYLATED A1C: CPT

## 2021-02-06 PROCEDURE — 81001 URINALYSIS AUTO W/SCOPE: CPT

## 2021-02-06 PROCEDURE — 82306 VITAMIN D 25 HYDROXY: CPT

## 2021-02-06 PROCEDURE — 84443 ASSAY THYROID STIM HORMONE: CPT

## 2021-02-06 PROCEDURE — 36415 COLL VENOUS BLD VENIPUNCTURE: CPT

## 2021-02-06 PROCEDURE — 80061 LIPID PANEL: CPT

## 2021-02-06 PROCEDURE — 85025 COMPLETE CBC W/AUTO DIFF WBC: CPT

## 2021-02-06 PROCEDURE — 80053 COMPREHEN METABOLIC PANEL: CPT

## 2021-02-08 LAB — 25(OH)D3 SERPL-MCNC: 28.1 NG/ML (ref 30–100)

## 2021-02-13 DIAGNOSIS — Z23 NEED FOR VACCINATION: ICD-10-CM

## 2021-03-02 ENCOUNTER — LAB ENCOUNTER (OUTPATIENT)
Dept: LAB | Age: 76
End: 2021-03-02
Attending: INTERNAL MEDICINE
Payer: MEDICARE

## 2021-03-02 DIAGNOSIS — G62.9 PERIPHERAL NERVE DISORDER: Primary | ICD-10-CM

## 2021-03-02 LAB
CK SERPL-CCNC: 84 U/L
CK SERPL-CCNC: 84 U/L
FOLATE SERPL-MCNC: 17.1 NG/ML (ref 8.7–?)
HAV IGM SER QL: 2.2 MG/DL (ref 1.6–2.6)
MAGNESIUM SERPL-MCNC: 2.2 MG/DL
PHOSPHATE SERPL-MCNC: 3.3 MG/DL (ref 2.5–4.9)
T4 FREE SERPL-MCNC: 0.9 NG/DL (ref 0.8–1.7)
TSH SERPL-ACNC: 1.74 MCUNITS/ML
TSI SER-ACNC: 1.74 MIU/ML (ref 0.36–3.74)
VIT B12 SERPL-MCNC: 295 PG/ML (ref 193–986)

## 2021-03-02 PROCEDURE — 82550 ASSAY OF CK (CPK): CPT

## 2021-03-02 PROCEDURE — 84443 ASSAY THYROID STIM HORMONE: CPT

## 2021-03-02 PROCEDURE — 84100 ASSAY OF PHOSPHORUS: CPT

## 2021-03-02 PROCEDURE — 82746 ASSAY OF FOLIC ACID SERUM: CPT

## 2021-03-02 PROCEDURE — 83735 ASSAY OF MAGNESIUM: CPT

## 2021-03-02 PROCEDURE — 84439 ASSAY OF FREE THYROXINE: CPT

## 2021-03-02 PROCEDURE — 36415 COLL VENOUS BLD VENIPUNCTURE: CPT

## 2021-03-02 PROCEDURE — 82607 VITAMIN B-12: CPT

## 2021-04-23 ENCOUNTER — HOSPITAL ENCOUNTER (OUTPATIENT)
Dept: GENERAL RADIOLOGY | Age: 76
Discharge: HOME | End: 2021-04-23
Attending: INTERNAL MEDICINE
Payer: MEDICARE

## 2021-04-23 DIAGNOSIS — M54.2 NECK PAIN: ICD-10-CM

## 2021-04-23 DIAGNOSIS — M25.611 DECREASED RANGE OF MOTION OF RIGHT SHOULDER: ICD-10-CM

## 2021-04-23 PROCEDURE — 73030 X-RAY EXAM OF SHOULDER: CPT | Performed by: INTERNAL MEDICINE

## 2021-04-23 PROCEDURE — 72050 X-RAY EXAM NECK SPINE 4/5VWS: CPT | Performed by: INTERNAL MEDICINE

## 2021-06-16 ENCOUNTER — OFFICE VISIT (OUTPATIENT)
Dept: CARDIOLOGY | Age: 76
End: 2021-06-16

## 2021-06-16 VITALS
WEIGHT: 199 LBS | RESPIRATION RATE: 18 BRPM | DIASTOLIC BLOOD PRESSURE: 72 MMHG | OXYGEN SATURATION: 96 % | BODY MASS INDEX: 39.07 KG/M2 | SYSTOLIC BLOOD PRESSURE: 118 MMHG | HEIGHT: 60 IN | HEART RATE: 71 BPM

## 2021-06-16 DIAGNOSIS — I25.10 CORONARY ARTERY DISEASE INVOLVING NATIVE CORONARY ARTERY OF NATIVE HEART WITHOUT ANGINA PECTORIS: Primary | ICD-10-CM

## 2021-06-16 DIAGNOSIS — E78.00 PURE HYPERCHOLESTEROLEMIA: ICD-10-CM

## 2021-06-16 PROCEDURE — 99214 OFFICE O/P EST MOD 30 MIN: CPT | Performed by: INTERNAL MEDICINE

## 2021-06-16 RX ORDER — METHOCARBAMOL 750 MG/1
1 TABLET, FILM COATED ORAL 3 TIMES DAILY
COMMUNITY
Start: 2021-06-02

## 2021-06-16 RX ORDER — ALENDRONATE SODIUM 35 MG/1
1 TABLET ORAL
COMMUNITY
Start: 2021-06-08

## 2021-07-03 ENCOUNTER — LAB ENCOUNTER (OUTPATIENT)
Dept: LAB | Age: 76
End: 2021-07-03
Attending: INTERNAL MEDICINE
Payer: MEDICARE

## 2021-07-03 DIAGNOSIS — M85.80: ICD-10-CM

## 2021-07-03 DIAGNOSIS — E66.9 OBESITY, UNSPECIFIED: Primary | ICD-10-CM

## 2021-07-03 DIAGNOSIS — I10 ESSENTIAL HYPERTENSION, MALIGNANT: ICD-10-CM

## 2021-07-03 LAB
ALBUMIN SERPL-MCNC: 3.7 G/DL (ref 3.4–5)
ALBUMIN/GLOB SERPL: 1 {RATIO} (ref 1–2)
ALP LIVER SERPL-CCNC: 62 U/L
ALT SERPL-CCNC: 23 U/L
ANION GAP SERPL CALC-SCNC: 5 MMOL/L (ref 0–18)
AST SERPL-CCNC: 16 U/L (ref 15–37)
BASOPHILS # BLD AUTO: 0.06 X10(3) UL (ref 0–0.2)
BASOPHILS NFR BLD AUTO: 0.9 %
BILIRUB SERPL-MCNC: 0.5 MG/DL (ref 0.1–2)
BUN BLD-MCNC: 9 MG/DL (ref 7–18)
BUN/CREAT SERPL: 15.5 (ref 10–20)
CALCIUM BLD-MCNC: 9.6 MG/DL (ref 8.5–10.1)
CHLORIDE SERPL-SCNC: 108 MMOL/L (ref 98–112)
CHOLEST SMN-MCNC: 143 MG/DL (ref ?–200)
CO2 SERPL-SCNC: 29 MMOL/L (ref 21–32)
CREAT BLD-MCNC: 0.58 MG/DL
DEPRECATED RDW RBC AUTO: 44.6 FL (ref 35.1–46.3)
EOSINOPHIL # BLD AUTO: 0.16 X10(3) UL (ref 0–0.7)
EOSINOPHIL NFR BLD AUTO: 2.5 %
ERYTHROCYTE [DISTWIDTH] IN BLOOD BY AUTOMATED COUNT: 13.1 % (ref 11–15)
EST. AVERAGE GLUCOSE BLD GHB EST-MCNC: 123 MG/DL (ref 68–126)
GLOBULIN PLAS-MCNC: 3.6 G/DL (ref 2.8–4.4)
GLUCOSE BLD-MCNC: 97 MG/DL (ref 70–99)
HBA1C MFR BLD HPLC: 5.9 % (ref ?–5.7)
HCT VFR BLD AUTO: 45.6 %
HDLC SERPL-MCNC: 39 MG/DL (ref 40–59)
HGB BLD-MCNC: 14.2 G/DL
IMM GRANULOCYTES # BLD AUTO: 0.03 X10(3) UL (ref 0–1)
IMM GRANULOCYTES NFR BLD: 0.5 %
LDLC SERPL CALC-MCNC: 78 MG/DL (ref ?–100)
LYMPHOCYTES # BLD AUTO: 2.57 X10(3) UL (ref 1–4)
LYMPHOCYTES NFR BLD AUTO: 40.3 %
M PROTEIN MFR SERPL ELPH: 7.3 G/DL (ref 6.4–8.2)
MCH RBC QN AUTO: 28.9 PG (ref 26–34)
MCHC RBC AUTO-ENTMCNC: 31.1 G/DL (ref 31–37)
MCV RBC AUTO: 92.7 FL
MONOCYTES # BLD AUTO: 0.6 X10(3) UL (ref 0.1–1)
MONOCYTES NFR BLD AUTO: 9.4 %
NEUTROPHILS # BLD AUTO: 2.95 X10 (3) UL (ref 1.5–7.7)
NEUTROPHILS # BLD AUTO: 2.95 X10(3) UL (ref 1.5–7.7)
NEUTROPHILS NFR BLD AUTO: 46.4 %
NONHDLC SERPL-MCNC: 104 MG/DL (ref ?–130)
OSMOLALITY SERPL CALC.SUM OF ELEC: 293 MOSM/KG (ref 275–295)
PATIENT FASTING Y/N/NP: YES
PATIENT FASTING Y/N/NP: YES
PLATELET # BLD AUTO: 304 10(3)UL (ref 150–450)
POTASSIUM SERPL-SCNC: 4.2 MMOL/L (ref 3.5–5.1)
RBC # BLD AUTO: 4.92 X10(6)UL
SODIUM SERPL-SCNC: 142 MMOL/L (ref 136–145)
TRIGL SERPL-MCNC: 150 MG/DL (ref 30–149)
VLDLC SERPL CALC-MCNC: 23 MG/DL (ref 0–30)
WBC # BLD AUTO: 6.4 X10(3) UL (ref 4–11)

## 2021-07-03 PROCEDURE — 36415 COLL VENOUS BLD VENIPUNCTURE: CPT

## 2021-07-03 PROCEDURE — 85025 COMPLETE CBC W/AUTO DIFF WBC: CPT

## 2021-07-03 PROCEDURE — 83036 HEMOGLOBIN GLYCOSYLATED A1C: CPT

## 2021-07-03 PROCEDURE — 80053 COMPREHEN METABOLIC PANEL: CPT

## 2021-07-03 PROCEDURE — 80061 LIPID PANEL: CPT

## 2021-09-04 ENCOUNTER — LAB ENCOUNTER (OUTPATIENT)
Dept: LAB | Age: 76
End: 2021-09-04
Attending: INTERNAL MEDICINE
Payer: MEDICARE

## 2021-09-04 DIAGNOSIS — E11.9 DIABETES MELLITUS (HCC): Primary | ICD-10-CM

## 2021-09-04 LAB
ALBUMIN SERPL-MCNC: 3.8 G/DL (ref 3.4–5)
ALBUMIN/GLOB SERPL: 1.1 {RATIO} (ref 1–2)
ALP LIVER SERPL-CCNC: 59 U/L
ALT SERPL-CCNC: 26 U/L
ANION GAP SERPL CALC-SCNC: 3 MMOL/L (ref 0–18)
AST SERPL-CCNC: 13 U/L (ref 15–37)
BASOPHILS # BLD AUTO: 0.05 X10(3) UL (ref 0–0.2)
BASOPHILS NFR BLD AUTO: 0.7 %
BILIRUB SERPL-MCNC: 0.4 MG/DL (ref 0.1–2)
BUN BLD-MCNC: 13 MG/DL (ref 7–18)
BUN/CREAT SERPL: 22 (ref 10–20)
CALCIUM BLD-MCNC: 9.2 MG/DL (ref 8.5–10.1)
CHLORIDE SERPL-SCNC: 108 MMOL/L (ref 98–112)
CO2 SERPL-SCNC: 29 MMOL/L (ref 21–32)
CREAT BLD-MCNC: 0.59 MG/DL
DEPRECATED RDW RBC AUTO: 45.2 FL (ref 35.1–46.3)
EOSINOPHIL # BLD AUTO: 0.14 X10(3) UL (ref 0–0.7)
EOSINOPHIL NFR BLD AUTO: 1.9 %
ERYTHROCYTE [DISTWIDTH] IN BLOOD BY AUTOMATED COUNT: 13.2 % (ref 11–15)
GLOBULIN PLAS-MCNC: 3.5 G/DL (ref 2.8–4.4)
GLUCOSE BLD-MCNC: 101 MG/DL (ref 70–99)
HCT VFR BLD AUTO: 44.8 %
HGB BLD-MCNC: 14.3 G/DL
IMM GRANULOCYTES # BLD AUTO: 0.03 X10(3) UL (ref 0–1)
IMM GRANULOCYTES NFR BLD: 0.4 %
LYMPHOCYTES # BLD AUTO: 2.38 X10(3) UL (ref 1–4)
LYMPHOCYTES NFR BLD AUTO: 32.5 %
M PROTEIN MFR SERPL ELPH: 7.3 G/DL (ref 6.4–8.2)
MCH RBC QN AUTO: 29.5 PG (ref 26–34)
MCHC RBC AUTO-ENTMCNC: 31.9 G/DL (ref 31–37)
MCV RBC AUTO: 92.6 FL
MONOCYTES # BLD AUTO: 0.68 X10(3) UL (ref 0.1–1)
MONOCYTES NFR BLD AUTO: 9.3 %
NEUTROPHILS # BLD AUTO: 4.05 X10 (3) UL (ref 1.5–7.7)
NEUTROPHILS # BLD AUTO: 4.05 X10(3) UL (ref 1.5–7.7)
NEUTROPHILS NFR BLD AUTO: 55.2 %
OSMOLALITY SERPL CALC.SUM OF ELEC: 290 MOSM/KG (ref 275–295)
PATIENT FASTING Y/N/NP: YES
PLATELET # BLD AUTO: 313 10(3)UL (ref 150–450)
POTASSIUM SERPL-SCNC: 4.2 MMOL/L (ref 3.5–5.1)
RBC # BLD AUTO: 4.84 X10(6)UL
SODIUM SERPL-SCNC: 140 MMOL/L (ref 136–145)
WBC # BLD AUTO: 7.3 X10(3) UL (ref 4–11)

## 2021-09-04 PROCEDURE — 36415 COLL VENOUS BLD VENIPUNCTURE: CPT

## 2021-09-04 PROCEDURE — 85025 COMPLETE CBC W/AUTO DIFF WBC: CPT

## 2021-09-04 PROCEDURE — 80053 COMPREHEN METABOLIC PANEL: CPT

## 2021-10-05 ENCOUNTER — TELEPHONE (OUTPATIENT)
Dept: PHYSICAL MEDICINE AND REHAB | Facility: CLINIC | Age: 76
End: 2021-10-05

## 2021-10-13 ENCOUNTER — MED REC SCAN ONLY (OUTPATIENT)
Dept: NEUROLOGY | Facility: CLINIC | Age: 76
End: 2021-10-13

## 2022-05-03 ENCOUNTER — LAB ENCOUNTER (OUTPATIENT)
Dept: LAB | Age: 77
End: 2022-05-03
Attending: INTERNAL MEDICINE
Payer: MEDICARE

## 2022-05-03 DIAGNOSIS — I10 HTN (HYPERTENSION): ICD-10-CM

## 2022-05-03 DIAGNOSIS — E11.9 DIABETES MELLITUS (HCC): Primary | ICD-10-CM

## 2022-05-03 DIAGNOSIS — E66.9 OBESITY, UNSPECIFIED: ICD-10-CM

## 2022-05-03 LAB
ALBUMIN SERPL-MCNC: 3.9 G/DL (ref 3.4–5)
ALBUMIN/GLOB SERPL: 1.2 {RATIO} (ref 1–2)
ALP LIVER SERPL-CCNC: 63 U/L
ALT SERPL-CCNC: 24 U/L
ANION GAP SERPL CALC-SCNC: 4 MMOL/L (ref 0–18)
AST SERPL-CCNC: 11 U/L (ref 15–37)
BASOPHILS # BLD AUTO: 0.05 X10(3) UL (ref 0–0.2)
BASOPHILS NFR BLD AUTO: 0.6 %
BILIRUB SERPL-MCNC: 0.5 MG/DL (ref 0.1–2)
BILIRUB UR QL: NEGATIVE
BUN BLD-MCNC: 12 MG/DL (ref 7–18)
BUN/CREAT SERPL: 19.4 (ref 10–20)
CALCIUM BLD-MCNC: 9.7 MG/DL (ref 8.5–10.1)
CHLORIDE SERPL-SCNC: 105 MMOL/L (ref 98–112)
CHOLEST SERPL-MCNC: 166 MG/DL (ref ?–200)
CO2 SERPL-SCNC: 32 MMOL/L (ref 21–32)
COLOR UR: YELLOW
CREAT BLD-MCNC: 0.62 MG/DL
CREAT UR-SCNC: 77.1 MG/DL
DEPRECATED RDW RBC AUTO: 45.3 FL (ref 35.1–46.3)
EOSINOPHIL # BLD AUTO: 0.15 X10(3) UL (ref 0–0.7)
EOSINOPHIL NFR BLD AUTO: 1.9 %
ERYTHROCYTE [DISTWIDTH] IN BLOOD BY AUTOMATED COUNT: 13.2 % (ref 11–15)
EST. AVERAGE GLUCOSE BLD GHB EST-MCNC: 126 MG/DL (ref 68–126)
FASTING PATIENT LIPID ANSWER: YES
FASTING STATUS PATIENT QL REPORTED: YES
GLOBULIN PLAS-MCNC: 3.3 G/DL (ref 2.8–4.4)
GLUCOSE BLD-MCNC: 101 MG/DL (ref 70–99)
GLUCOSE UR-MCNC: NEGATIVE MG/DL
HBA1C MFR BLD: 6 % (ref ?–5.7)
HCT VFR BLD AUTO: 48.5 %
HDLC SERPL-MCNC: 43 MG/DL (ref 40–59)
HGB BLD-MCNC: 15.3 G/DL
IMM GRANULOCYTES # BLD AUTO: 0.03 X10(3) UL (ref 0–1)
IMM GRANULOCYTES NFR BLD: 0.4 %
KETONES UR-MCNC: NEGATIVE MG/DL
LDLC SERPL CALC-MCNC: 93 MG/DL (ref ?–100)
LYMPHOCYTES # BLD AUTO: 2.57 X10(3) UL (ref 1–4)
LYMPHOCYTES NFR BLD AUTO: 32.7 %
MCH RBC QN AUTO: 29.2 PG (ref 26–34)
MCHC RBC AUTO-ENTMCNC: 31.5 G/DL (ref 31–37)
MCV RBC AUTO: 92.6 FL
MICROALBUMIN UR-MCNC: 1.2 MG/DL
MICROALBUMIN/CREAT 24H UR-RTO: 15.6 UG/MG (ref ?–30)
MONOCYTES # BLD AUTO: 0.76 X10(3) UL (ref 0.1–1)
MONOCYTES NFR BLD AUTO: 9.7 %
NEUTROPHILS # BLD AUTO: 4.3 X10 (3) UL (ref 1.5–7.7)
NEUTROPHILS # BLD AUTO: 4.3 X10(3) UL (ref 1.5–7.7)
NEUTROPHILS NFR BLD AUTO: 54.7 %
NITRITE UR QL STRIP.AUTO: POSITIVE
NONHDLC SERPL-MCNC: 123 MG/DL (ref ?–130)
OSMOLALITY SERPL CALC.SUM OF ELEC: 292 MOSM/KG (ref 275–295)
PH UR: 6 [PH] (ref 5–8)
PLATELET # BLD AUTO: 302 10(3)UL (ref 150–450)
POTASSIUM SERPL-SCNC: 4.5 MMOL/L (ref 3.5–5.1)
PROT SERPL-MCNC: 7.2 G/DL (ref 6.4–8.2)
PROT UR-MCNC: NEGATIVE MG/DL
RBC # BLD AUTO: 5.24 X10(6)UL
SODIUM SERPL-SCNC: 141 MMOL/L (ref 136–145)
SP GR UR STRIP: 1.01 (ref 1–1.03)
T4 FREE SERPL-MCNC: 0.9 NG/DL (ref 0.8–1.7)
TRIGL SERPL-MCNC: 176 MG/DL (ref 30–149)
TSI SER-ACNC: 2.92 MIU/ML (ref 0.36–3.74)
UROBILINOGEN UR STRIP-ACNC: <2
VIT C UR-MCNC: NEGATIVE MG/DL
VLDLC SERPL CALC-MCNC: 29 MG/DL (ref 0–30)
WBC # BLD AUTO: 7.9 X10(3) UL (ref 4–11)
WBC #/AREA URNS AUTO: >50 /HPF

## 2022-05-03 PROCEDURE — 85025 COMPLETE CBC W/AUTO DIFF WBC: CPT

## 2022-05-03 PROCEDURE — 80053 COMPREHEN METABOLIC PANEL: CPT

## 2022-05-03 PROCEDURE — 84439 ASSAY OF FREE THYROXINE: CPT

## 2022-05-03 PROCEDURE — 84443 ASSAY THYROID STIM HORMONE: CPT

## 2022-05-03 PROCEDURE — 81001 URINALYSIS AUTO W/SCOPE: CPT

## 2022-05-03 PROCEDURE — 80061 LIPID PANEL: CPT

## 2022-05-03 PROCEDURE — 36415 COLL VENOUS BLD VENIPUNCTURE: CPT

## 2022-05-03 PROCEDURE — 82570 ASSAY OF URINE CREATININE: CPT

## 2022-05-03 PROCEDURE — 83036 HEMOGLOBIN GLYCOSYLATED A1C: CPT

## 2022-05-03 PROCEDURE — 82043 UR ALBUMIN QUANTITATIVE: CPT

## 2022-07-02 ENCOUNTER — LAB ENCOUNTER (OUTPATIENT)
Dept: LAB | Age: 77
End: 2022-07-02
Attending: INTERNAL MEDICINE
Payer: MEDICARE

## 2022-07-02 DIAGNOSIS — N39.0 URINARY TRACT INFECTION, SITE NOT SPECIFIED: Primary | ICD-10-CM

## 2022-07-02 LAB
BILIRUB UR QL: NEGATIVE
COLOR UR: YELLOW
GLUCOSE UR-MCNC: NEGATIVE MG/DL
HGB UR QL STRIP.AUTO: NEGATIVE
KETONES UR-MCNC: NEGATIVE MG/DL
NITRITE UR QL STRIP.AUTO: POSITIVE
PH UR: 6 [PH] (ref 5–8)
PROT UR-MCNC: NEGATIVE MG/DL
SP GR UR STRIP: 1.01 (ref 1–1.03)
UROBILINOGEN UR STRIP-ACNC: <2
VIT C UR-MCNC: NEGATIVE MG/DL

## 2022-07-02 PROCEDURE — 81001 URINALYSIS AUTO W/SCOPE: CPT

## 2022-07-02 PROCEDURE — 87086 URINE CULTURE/COLONY COUNT: CPT

## 2022-07-02 PROCEDURE — 87077 CULTURE AEROBIC IDENTIFY: CPT

## 2022-07-02 PROCEDURE — 87186 SC STD MICRODIL/AGAR DIL: CPT

## 2022-08-06 ENCOUNTER — LAB ENCOUNTER (OUTPATIENT)
Dept: LAB | Age: 77
End: 2022-08-06
Attending: INTERNAL MEDICINE
Payer: MEDICARE

## 2022-08-06 DIAGNOSIS — E66.9 OBESITY, UNSPECIFIED: Primary | ICD-10-CM

## 2022-08-06 DIAGNOSIS — M19.90 SENILE ARTHRITIS: ICD-10-CM

## 2022-08-06 LAB
ALBUMIN SERPL-MCNC: 3.7 G/DL (ref 3.4–5)
ALBUMIN/GLOB SERPL: 1 {RATIO} (ref 1–2)
ALP LIVER SERPL-CCNC: 69 U/L
ALT SERPL-CCNC: 26 U/L
ANION GAP SERPL CALC-SCNC: 4 MMOL/L (ref 0–18)
AST SERPL-CCNC: 15 U/L (ref 15–37)
BASOPHILS # BLD AUTO: 0.05 X10(3) UL (ref 0–0.2)
BASOPHILS NFR BLD AUTO: 0.7 %
BILIRUB SERPL-MCNC: 0.5 MG/DL (ref 0.1–2)
BILIRUB UR QL: NEGATIVE
BUN BLD-MCNC: 8 MG/DL (ref 7–18)
BUN/CREAT SERPL: 11.9 (ref 10–20)
CALCIUM BLD-MCNC: 9.5 MG/DL (ref 8.5–10.1)
CHLORIDE SERPL-SCNC: 108 MMOL/L (ref 98–112)
CHOLEST SERPL-MCNC: 127 MG/DL (ref ?–200)
CO2 SERPL-SCNC: 29 MMOL/L (ref 21–32)
COLOR UR: YELLOW
CREAT BLD-MCNC: 0.67 MG/DL
CREAT UR-SCNC: 86 MG/DL
DEPRECATED RDW RBC AUTO: 45.7 FL (ref 35.1–46.3)
EOSINOPHIL # BLD AUTO: 0.14 X10(3) UL (ref 0–0.7)
EOSINOPHIL NFR BLD AUTO: 1.9 %
ERYTHROCYTE [DISTWIDTH] IN BLOOD BY AUTOMATED COUNT: 13 % (ref 11–15)
EST. AVERAGE GLUCOSE BLD GHB EST-MCNC: 123 MG/DL (ref 68–126)
FASTING PATIENT LIPID ANSWER: YES
FASTING STATUS PATIENT QL REPORTED: YES
FOLATE SERPL-MCNC: 18.1 NG/ML (ref 8.7–?)
GFR SERPLBLD BASED ON 1.73 SQ M-ARVRAT: 89 ML/MIN/1.73M2 (ref 60–?)
GLOBULIN PLAS-MCNC: 3.6 G/DL (ref 2.8–4.4)
GLUCOSE BLD-MCNC: 103 MG/DL (ref 70–99)
GLUCOSE UR-MCNC: NEGATIVE MG/DL
HBA1C MFR BLD: 5.9 % (ref ?–5.7)
HCT VFR BLD AUTO: 49.7 %
HDLC SERPL-MCNC: 38 MG/DL (ref 40–59)
HGB BLD-MCNC: 15.2 G/DL
IMM GRANULOCYTES # BLD AUTO: 0.03 X10(3) UL (ref 0–1)
IMM GRANULOCYTES NFR BLD: 0.4 %
KETONES UR-MCNC: NEGATIVE MG/DL
LDLC SERPL CALC-MCNC: 66 MG/DL (ref ?–100)
LYMPHOCYTES # BLD AUTO: 2.72 X10(3) UL (ref 1–4)
LYMPHOCYTES NFR BLD AUTO: 37.8 %
MCH RBC QN AUTO: 29 PG (ref 26–34)
MCHC RBC AUTO-ENTMCNC: 30.6 G/DL (ref 31–37)
MCV RBC AUTO: 94.7 FL
MICROALBUMIN UR-MCNC: 1.54 MG/DL
MICROALBUMIN/CREAT 24H UR-RTO: 17.9 UG/MG (ref ?–30)
MONOCYTES # BLD AUTO: 0.66 X10(3) UL (ref 0.1–1)
MONOCYTES NFR BLD AUTO: 9.2 %
NEUTROPHILS # BLD AUTO: 3.6 X10 (3) UL (ref 1.5–7.7)
NEUTROPHILS # BLD AUTO: 3.6 X10(3) UL (ref 1.5–7.7)
NEUTROPHILS NFR BLD AUTO: 50 %
NITRITE UR QL STRIP.AUTO: POSITIVE
NONHDLC SERPL-MCNC: 89 MG/DL (ref ?–130)
OSMOLALITY SERPL CALC.SUM OF ELEC: 291 MOSM/KG (ref 275–295)
PH UR: 7 [PH] (ref 5–8)
PLATELET # BLD AUTO: 293 10(3)UL (ref 150–450)
POTASSIUM SERPL-SCNC: 4 MMOL/L (ref 3.5–5.1)
PROT SERPL-MCNC: 7.3 G/DL (ref 6.4–8.2)
PROT UR-MCNC: NEGATIVE MG/DL
RBC # BLD AUTO: 5.25 X10(6)UL
SODIUM SERPL-SCNC: 141 MMOL/L (ref 136–145)
SP GR UR STRIP: 1.01 (ref 1–1.03)
T4 FREE SERPL-MCNC: 0.9 NG/DL (ref 0.8–1.7)
TRIGL SERPL-MCNC: 132 MG/DL (ref 30–149)
TSI SER-ACNC: 2.87 MIU/ML (ref 0.36–3.74)
UROBILINOGEN UR STRIP-ACNC: 0.2
VIT B12 SERPL-MCNC: 273 PG/ML (ref 193–986)
VLDLC SERPL CALC-MCNC: 20 MG/DL (ref 0–30)
WBC # BLD AUTO: 7.2 X10(3) UL (ref 4–11)
WBC #/AREA URNS AUTO: >50 /HPF
WBC CLUMPS UR QL AUTO: PRESENT /HPF

## 2022-08-06 PROCEDURE — 80061 LIPID PANEL: CPT

## 2022-08-06 PROCEDURE — 36415 COLL VENOUS BLD VENIPUNCTURE: CPT

## 2022-08-06 PROCEDURE — 82746 ASSAY OF FOLIC ACID SERUM: CPT

## 2022-08-06 PROCEDURE — 84439 ASSAY OF FREE THYROXINE: CPT

## 2022-08-06 PROCEDURE — 84443 ASSAY THYROID STIM HORMONE: CPT

## 2022-08-06 PROCEDURE — 82607 VITAMIN B-12: CPT

## 2022-08-06 PROCEDURE — 82570 ASSAY OF URINE CREATININE: CPT

## 2022-08-06 PROCEDURE — 85025 COMPLETE CBC W/AUTO DIFF WBC: CPT

## 2022-08-06 PROCEDURE — 83036 HEMOGLOBIN GLYCOSYLATED A1C: CPT

## 2022-08-06 PROCEDURE — 80053 COMPREHEN METABOLIC PANEL: CPT

## 2022-08-06 PROCEDURE — 81001 URINALYSIS AUTO W/SCOPE: CPT

## 2022-08-06 PROCEDURE — 81015 MICROSCOPIC EXAM OF URINE: CPT

## 2022-08-06 PROCEDURE — 82043 UR ALBUMIN QUANTITATIVE: CPT

## 2022-08-15 ENCOUNTER — APPOINTMENT (OUTPATIENT)
Dept: GENERAL RADIOLOGY | Facility: HOSPITAL | Age: 77
End: 2022-08-15
Attending: EMERGENCY MEDICINE
Payer: MEDICARE

## 2022-08-15 ENCOUNTER — HOSPITAL ENCOUNTER (INPATIENT)
Facility: HOSPITAL | Age: 77
LOS: 4 days | Discharge: HOME | End: 2022-08-19
Attending: EMERGENCY MEDICINE | Admitting: INTERNAL MEDICINE
Payer: MEDICARE

## 2022-08-15 DIAGNOSIS — U07.1 COVID: Primary | ICD-10-CM

## 2022-08-15 DIAGNOSIS — R09.02 HYPOXIA: ICD-10-CM

## 2022-08-15 PROBLEM — E87.6 HYPOKALEMIA: Status: ACTIVE | Noted: 2022-08-15

## 2022-08-15 LAB
ANION GAP SERPL CALC-SCNC: 7 MMOL/L (ref 0–18)
BASOPHILS # BLD AUTO: 0.03 X10(3) UL (ref 0–0.2)
BASOPHILS NFR BLD AUTO: 0.3 %
BUN BLD-MCNC: 12 MG/DL (ref 7–18)
BUN/CREAT SERPL: 14.8 (ref 10–20)
CALCIUM BLD-MCNC: 9.7 MG/DL (ref 8.5–10.1)
CHLORIDE SERPL-SCNC: 104 MMOL/L (ref 98–112)
CO2 SERPL-SCNC: 25 MMOL/L (ref 21–32)
CREAT BLD-MCNC: 0.81 MG/DL
D DIMER PPP FEU-MCNC: 0.45 UG/ML FEU (ref ?–0.79)
DEPRECATED RDW RBC AUTO: 44.2 FL (ref 35.1–46.3)
EOSINOPHIL # BLD AUTO: 0.02 X10(3) UL (ref 0–0.7)
EOSINOPHIL NFR BLD AUTO: 0.2 %
ERYTHROCYTE [DISTWIDTH] IN BLOOD BY AUTOMATED COUNT: 13.1 % (ref 11–15)
GFR SERPLBLD BASED ON 1.73 SQ M-ARVRAT: 74 ML/MIN/1.73M2 (ref 60–?)
GLUCOSE BLD-MCNC: 129 MG/DL (ref 70–99)
GLUCOSE BLDC GLUCOMTR-MCNC: 136 MG/DL (ref 70–99)
HCT VFR BLD AUTO: 44.6 %
HGB BLD-MCNC: 14.4 G/DL
IMM GRANULOCYTES # BLD AUTO: 0.07 X10(3) UL (ref 0–1)
IMM GRANULOCYTES NFR BLD: 0.8 %
LYMPHOCYTES # BLD AUTO: 1.36 X10(3) UL (ref 1–4)
LYMPHOCYTES NFR BLD AUTO: 15.1 %
MCH RBC QN AUTO: 29.6 PG (ref 26–34)
MCHC RBC AUTO-ENTMCNC: 32.3 G/DL (ref 31–37)
MCV RBC AUTO: 91.6 FL
MONOCYTES # BLD AUTO: 1.11 X10(3) UL (ref 0.1–1)
MONOCYTES NFR BLD AUTO: 12.3 %
NEUTROPHILS # BLD AUTO: 6.41 X10 (3) UL (ref 1.5–7.7)
NEUTROPHILS # BLD AUTO: 6.41 X10(3) UL (ref 1.5–7.7)
NEUTROPHILS NFR BLD AUTO: 71.3 %
OSMOLALITY SERPL CALC.SUM OF ELEC: 283 MOSM/KG (ref 275–295)
PLATELET # BLD AUTO: 260 10(3)UL (ref 150–450)
POTASSIUM SERPL-SCNC: 3.3 MMOL/L (ref 3.5–5.1)
RBC # BLD AUTO: 4.87 X10(6)UL
SARS-COV-2 RNA RESP QL NAA+PROBE: DETECTED
SODIUM SERPL-SCNC: 136 MMOL/L (ref 136–145)
TROPONIN I HIGH SENSITIVITY: 7 NG/L
WBC # BLD AUTO: 9 X10(3) UL (ref 4–11)

## 2022-08-15 PROCEDURE — 80048 BASIC METABOLIC PNL TOTAL CA: CPT | Performed by: EMERGENCY MEDICINE

## 2022-08-15 PROCEDURE — 85379 FIBRIN DEGRADATION QUANT: CPT | Performed by: EMERGENCY MEDICINE

## 2022-08-15 PROCEDURE — 3E0333Z INTRODUCTION OF ANTI-INFLAMMATORY INTO PERIPHERAL VEIN, PERCUTANEOUS APPROACH: ICD-10-PCS | Performed by: INTERNAL MEDICINE

## 2022-08-15 PROCEDURE — 99285 EMERGENCY DEPT VISIT HI MDM: CPT

## 2022-08-15 PROCEDURE — XW033E5 INTRODUCTION OF REMDESIVIR ANTI-INFECTIVE INTO PERIPHERAL VEIN, PERCUTANEOUS APPROACH, NEW TECHNOLOGY GROUP 5: ICD-10-PCS | Performed by: INTERNAL MEDICINE

## 2022-08-15 PROCEDURE — 82962 GLUCOSE BLOOD TEST: CPT

## 2022-08-15 PROCEDURE — 71045 X-RAY EXAM CHEST 1 VIEW: CPT | Performed by: EMERGENCY MEDICINE

## 2022-08-15 PROCEDURE — 96361 HYDRATE IV INFUSION ADD-ON: CPT

## 2022-08-15 PROCEDURE — 85025 COMPLETE CBC W/AUTO DIFF WBC: CPT | Performed by: EMERGENCY MEDICINE

## 2022-08-15 PROCEDURE — 93010 ELECTROCARDIOGRAM REPORT: CPT | Performed by: EMERGENCY MEDICINE

## 2022-08-15 PROCEDURE — 93005 ELECTROCARDIOGRAM TRACING: CPT

## 2022-08-15 PROCEDURE — 96374 THER/PROPH/DIAG INJ IV PUSH: CPT

## 2022-08-15 PROCEDURE — 84484 ASSAY OF TROPONIN QUANT: CPT | Performed by: EMERGENCY MEDICINE

## 2022-08-15 RX ORDER — LATANOPROST 50 UG/ML
1 SOLUTION/ DROPS OPHTHALMIC DAILY
COMMUNITY
Start: 2022-07-29

## 2022-08-15 RX ORDER — ACETAMINOPHEN AND CODEINE PHOSPHATE 300; 30 MG/1; MG/1
1 TABLET ORAL DAILY PRN
COMMUNITY
Start: 2022-07-18

## 2022-08-15 RX ORDER — ASPIRIN 81 MG/1
81 TABLET, CHEWABLE ORAL DAILY
Status: DISCONTINUED | OUTPATIENT
Start: 2022-08-16 | End: 2022-08-19

## 2022-08-15 RX ORDER — METHOCARBAMOL 750 MG/1
750 TABLET, FILM COATED ORAL 3 TIMES DAILY
COMMUNITY
Start: 2022-04-07

## 2022-08-15 RX ORDER — CELECOXIB 200 MG/1
200 CAPSULE ORAL DAILY
Status: DISCONTINUED | OUTPATIENT
Start: 2022-08-16 | End: 2022-08-19

## 2022-08-15 RX ORDER — DEXAMETHASONE SODIUM PHOSPHATE 10 MG/ML
6 INJECTION, SOLUTION INTRAMUSCULAR; INTRAVENOUS ONCE
Status: COMPLETED | OUTPATIENT
Start: 2022-08-15 | End: 2022-08-15

## 2022-08-15 RX ORDER — GABAPENTIN 400 MG/1
800 CAPSULE ORAL 2 TIMES DAILY
Status: DISCONTINUED | OUTPATIENT
Start: 2022-08-15 | End: 2022-08-19

## 2022-08-15 RX ORDER — ALENDRONATE SODIUM 35 MG/1
35 TABLET ORAL WEEKLY
COMMUNITY
Start: 2022-05-19

## 2022-08-15 RX ORDER — CALCIUM CARBONATE 200(500)MG
500 TABLET,CHEWABLE ORAL 2 TIMES DAILY
Status: DISCONTINUED | OUTPATIENT
Start: 2022-08-15 | End: 2022-08-19

## 2022-08-15 RX ORDER — ACETAMINOPHEN AND CODEINE PHOSPHATE 300; 30 MG/1; MG/1
1 TABLET ORAL DAILY PRN
Status: DISCONTINUED | OUTPATIENT
Start: 2022-08-15 | End: 2022-08-19

## 2022-08-15 RX ORDER — AMLODIPINE BESYLATE 2.5 MG/1
2.5 TABLET ORAL DAILY
Status: DISCONTINUED | OUTPATIENT
Start: 2022-08-16 | End: 2022-08-19

## 2022-08-15 RX ORDER — ACETAMINOPHEN 500 MG
500 TABLET ORAL 3 TIMES DAILY
Status: DISCONTINUED | OUTPATIENT
Start: 2022-08-15 | End: 2022-08-19

## 2022-08-15 RX ORDER — AMLODIPINE BESYLATE 2.5 MG/1
2.5 TABLET ORAL DAILY
COMMUNITY
Start: 2022-06-26

## 2022-08-15 RX ORDER — DULOXETIN HYDROCHLORIDE 60 MG/1
60 CAPSULE, DELAYED RELEASE ORAL DAILY
Status: DISCONTINUED | OUTPATIENT
Start: 2022-08-16 | End: 2022-08-19

## 2022-08-15 RX ORDER — LATANOPROST 50 UG/ML
1 SOLUTION/ DROPS OPHTHALMIC NIGHTLY
Status: DISCONTINUED | OUTPATIENT
Start: 2022-08-15 | End: 2022-08-19

## 2022-08-15 RX ORDER — LATANOPROST 50 UG/ML
1 SOLUTION/ DROPS OPHTHALMIC 2 TIMES DAILY
COMMUNITY
Start: 2020-11-04 | End: 2022-08-19

## 2022-08-15 RX ORDER — DULOXETIN HYDROCHLORIDE 60 MG/1
60 CAPSULE, DELAYED RELEASE ORAL DAILY
COMMUNITY
Start: 2022-07-18

## 2022-08-15 NOTE — ED QUICK NOTES
Transport at bedside. Patient aware of plan of care, verbalizes understanding. Brought to floor with belongings. IV RDV sent up with pt.

## 2022-08-15 NOTE — ED INITIAL ASSESSMENT (HPI)
Patient arrived to ED via EMS with complaints of dizziness x2 days and a productive cough. Patients  in ED for similar symptoms. First rseponders checked home for carbon monoxide and stated it was a low reading.

## 2022-08-15 NOTE — ED QUICK NOTES
Orders for admission, patient is aware of plan and ready to go upstairs. Any questions, please call ED RN Hawa at [address] [facility] [address].        COVID Test Ordered in ED: Rapid SARS-CoV-2 by PCR    COVID Suspicion at Admission: N/A    Running Infusions:  None    Mental Status/LOC at time of transport: aox3    Other pertinent information: Covid positive   CIWA score: N/A   NIH score:  N/A

## 2022-08-15 NOTE — PLAN OF CARE
Admitted from ER. On room air. Family at bedside updated on plan of care. IV remdesivir started. All safety measures in place. Problem: Patient Centered Care  Goal: Patient preferences are identified and integrated in the patient's plan of care  Description: Interventions:  - What would you like us to know as we care for you? I live at home with my   - Provide timely, complete, and accurate information to patient/family  - Incorporate patient and family knowledge, values, beliefs, and cultural backgrounds into the planning and delivery of care  - Encourage patient/family to participate in care and decision-making at the level they choose  - Honor patient and family perspectives and choices  Outcome: Progressing     Problem: Patient/Family Goals  Goal: Patient/Family Long Term Goal  Description: Patient's Long Term Goal: go home    Interventions:  - IV remdesivir  -MD plan   -oxygen monitoring  - See additional Care Plan goals for specific interventions  Outcome: Progressing  Goal: Patient/Family Short Term Goal  Description: Patient's Short Term Goal: stay on RA    Interventions:   - pulm consult  -Cont pulse ox monitoring  - See additional Care Plan goals for specific interventions  Outcome: Progressing     Problem: SAFETY ADULT - FALL  Goal: Free from fall injury  Description: INTERVENTIONS:  - Assess pt frequently for physical needs  - Identify cognitive and physical deficits and behaviors that affect risk of falls.   - [place] fall precautions as indicated by assessment.  - Educate pt/family on patient safety including physical limitations  - Instruct pt to call for assistance with activity based on assessment  - Modify environment to reduce risk of injury  - Provide assistive devices as appropriate  - Consider OT/PT consult to assist with strengthening/mobility  - Encourage toileting schedule  Outcome: Progressing     Problem: DISCHARGE PLANNING  Goal: Discharge to home or other facility with appropriate resources  Description: INTERVENTIONS:  - Identify barriers to discharge w/pt and caregiver  - Include patient/family/discharge partner in discharge planning  - Arrange for needed discharge resources and transportation as appropriate  - Identify discharge learning needs (meds, wound care, etc)  - Arrange for interpreters to assist at discharge as needed  - Consider post-discharge preferences of patient/family/discharge partner  - Complete POLST form as appropriate  - Assess patient's ability to be responsible for managing their own health  - Refer to Case Management Department for coordinating discharge planning if the patient needs post-hospital services based on physician/LIP order or complex needs related to functional status, cognitive ability or social support system  Outcome: Progressing

## 2022-08-16 PROBLEM — J12.82 PNEUMONIA DUE TO COVID-19 VIRUS: Status: ACTIVE | Noted: 2022-08-15

## 2022-08-16 PROBLEM — U07.1 PNEUMONIA DUE TO COVID-19 VIRUS: Status: ACTIVE | Noted: 2022-08-15

## 2022-08-16 LAB
ALBUMIN SERPL-MCNC: 3.3 G/DL (ref 3.4–5)
ALBUMIN/GLOB SERPL: 0.8 {RATIO} (ref 1–2)
ALP LIVER SERPL-CCNC: 58 U/L
ALT SERPL-CCNC: 32 U/L
ANION GAP SERPL CALC-SCNC: 6 MMOL/L (ref 0–18)
AST SERPL-CCNC: 21 U/L (ref 15–37)
BILIRUB SERPL-MCNC: 0.4 MG/DL (ref 0.1–2)
BUN BLD-MCNC: 15 MG/DL (ref 7–18)
BUN/CREAT SERPL: 28.3 (ref 10–20)
CALCIUM BLD-MCNC: 8.8 MG/DL (ref 8.5–10.1)
CHLORIDE SERPL-SCNC: 108 MMOL/L (ref 98–112)
CO2 SERPL-SCNC: 25 MMOL/L (ref 21–32)
CREAT BLD-MCNC: 0.53 MG/DL
GFR SERPLBLD BASED ON 1.73 SQ M-ARVRAT: 94 ML/MIN/1.73M2 (ref 60–?)
GLOBULIN PLAS-MCNC: 3.9 G/DL (ref 2.8–4.4)
GLUCOSE BLD-MCNC: 124 MG/DL (ref 70–99)
OSMOLALITY SERPL CALC.SUM OF ELEC: 290 MOSM/KG (ref 275–295)
POTASSIUM SERPL-SCNC: 4 MMOL/L (ref 3.5–5.1)
PROT SERPL-MCNC: 7.2 G/DL (ref 6.4–8.2)
SODIUM SERPL-SCNC: 139 MMOL/L (ref 136–145)

## 2022-08-16 PROCEDURE — 80053 COMPREHEN METABOLIC PANEL: CPT | Performed by: EMERGENCY MEDICINE

## 2022-08-16 RX ORDER — DEXAMETHASONE SODIUM PHOSPHATE 4 MG/ML
6 VIAL (ML) INJECTION DAILY
Status: DISCONTINUED | OUTPATIENT
Start: 2022-08-16 | End: 2022-08-19

## 2022-08-16 RX ORDER — ENOXAPARIN SODIUM 100 MG/ML
40 INJECTION SUBCUTANEOUS DAILY
Status: DISCONTINUED | OUTPATIENT
Start: 2022-08-16 | End: 2022-08-19

## 2022-08-16 NOTE — PLAN OF CARE
Problem: Patient Centered Care  Goal: Patient preferences are identified and integrated in the patient's plan of care  Description: Interventions:  - What would you like us to know as we care for you? I live at home with my   - Provide timely, complete, and accurate information to patient/family  - Incorporate patient and family knowledge, values, beliefs, and cultural backgrounds into the planning and delivery of care  - Encourage patient/family to participate in care and decision-making at the level they choose  - Honor patient and family perspectives and choices  8/16/2022 0050 by Christopher Hahn RN  Outcome: Progressing  8/16/2022 0049 by Christopher Hahn RN  Outcome: Progressing     Problem: Patient/Family Goals  Goal: Patient/Family Long Term Goal  Description: Patient's Long Term Goal: go home    Interventions:  - IV remdesivir  -MD plan   -oxygen monitoring  - See additional Care Plan goals for specific interventions  8/16/2022 0050 by Christopher Hahn RN  Outcome: Progressing  8/16/2022 0049 by Christopher Hahn RN  Outcome: Progressing  Goal: Patient/Family Short Term Goal  Description: Patient's Short Term Goal: stay on RA    Interventions:   - pulm consult  -Cont pulse ox monitoring  - See additional Care Plan goals for specific interventions  8/16/2022 0050 by Christopher Hahn RN  Outcome: Progressing  8/16/2022 0049 by Christopher Hahn RN  Outcome: Progressing     Problem: SAFETY ADULT - FALL  Goal: Free from fall injury  Description: INTERVENTIONS:  - Assess pt frequently for physical needs  - Identify cognitive and physical deficits and behaviors that affect risk of falls.   - [place] fall precautions as indicated by assessment.  - Educate pt/family on patient safety including physical limitations  - Instruct pt to call for assistance with activity based on assessment  - Modify environment to reduce risk of injury  - Provide assistive devices as appropriate  - Consider OT/PT consult to assist with strengthening/mobility  - Encourage toileting schedule  8/16/2022 0050 by Jaclyn Alfred RN  Outcome: Progressing  8/16/2022 0049 by Jaclyn Alfred RN  Outcome: Progressing     Problem: DISCHARGE PLANNING  Goal: Discharge to home or other facility with appropriate resources  Description: INTERVENTIONS:  - Identify barriers to discharge w/pt and caregiver  - Include patient/family/discharge partner in discharge planning  - Arrange for needed discharge resources and transportation as appropriate  - Identify discharge learning needs (meds, wound care, etc)  - Arrange for interpreters to assist at discharge as needed  - Consider post-discharge preferences of patient/family/discharge partner  - Complete POLST form as appropriate  - Assess patient's ability to be responsible for managing their own health  - Refer to Case Management Department for coordinating discharge planning if the patient needs post-hospital services based on physician/LIP order or complex needs related to functional status, cognitive ability or social support system  8/16/2022 0050 by Jaclyn Alfred RN  Outcome: Progressing  8/16/2022 0049 by Jaclyn Alfred RN  Outcome: Progressing     Problem: RESPIRATORY - ADULT  Goal: Achieves optimal ventilation and oxygenation  Description: INTERVENTIONS:  - Assess for changes in respiratory status  - Assess for changes in mentation and behavior  - Position to facilitate oxygenation and minimize respiratory effort  - Oxygen supplementation based on oxygen saturation or ABGs  - Provide Smoking Cessation handout, if applicable  - Encourage broncho-pulmonary hygiene including cough, deep breathe, Incentive Spirometry  - Assess the need for suctioning and perform as needed  - Assess and instruct to report SOB or any respiratory difficulty  - Respiratory Therapy support as indicated  - Manage/alleviate anxiety  - Monitor for signs/symptoms of CO2 retention  Outcome: Progressing     Problem: GASTROINTESTINAL - ADULT  Goal: Minimal or absence of nausea and vomiting  Description: INTERVENTIONS:  - Maintain adequate hydration with IV or PO as ordered and tolerated  - Nasogastric tube to low intermittent suction as ordered  - Evaluate effectiveness of ordered antiemetic medications  - Provide nonpharmacologic comfort measures as appropriate  - Advance diet as tolerated, if ordered  - Obtain nutritional consult as needed  - Evaluate fluid balance  Outcome: Progressing     Patient alert, vitals stable, on 1L O2, medications tolerated well. Scheduled tylenol administered for pain. Patient assisted with needs to stay clean, dry ,and comfortable. Bed alarm on. Call light within reach.

## 2022-08-16 NOTE — CONSULTS
[facility]    PATIENT'S NAME: RADHA MORALES   ATTENDING PHYSICIAN: Adrien Hyde MD   CONSULTING PHYSICIAN: Remedios King. Jenny Thomson MD   PATIENT ACCOUNT#:   [id number]    LOCATION:  [address] [place] [address] RECORD #:   [id number]       YOB: 1945  ADMISSION DATE:       08/15/2022      CONSULT DATE:  08/16/2022    REPORT OF CONSULTATION      HISTORY OF PRESENT ILLNESS:  The patient is a 73-year-old white female who presented to emergency room with nonproductive cough, dizziness, lightheadedness, and fatigue. The patient denied body aches. No fever. The patient was hypoxic on room air. Chest x-ray showed bilateral basal atelectasis. The patient denied loss of smell test.  No GI symptoms. Covid CPR was positive    PAST MEDICAL HISTORY:  Hypertension. PAST SURGICAL HISTORY:  Both knee replacement. MEDICATIONS:  Home medications of Tylenol with codeine p.r.n., Norvasc 2.5 mg a day, Cymbalta 60 mg daily, aspirin 81 mg a day, gabapentin 400 mg twice a day, Crestor 5 mg at bedtime, Celebrex 200 mg a day. ALLERGIES:  Iodine. SOCIAL HISTORY:  Social smoker. Denies alcohol abuse. The patient is . Her  also suffers from Lee and admitted to Room 444. FAMILY HISTORY:  Both parents lived into [de-identified] and 80s. No significant medically issues. REVIEW OF SYSTEMS:  GENERAL:  No weight loss. HEAD:  No headache. EYES:  No diplopia, blurred vision. EARS:  No tinnitus, impaired hearing. NOSE:  No rhinorrhea, epistaxis. THROAT:  No sore throat. NECK:  No neck stiffness. RESPIRATORY:  See the present illness. CARDIOVASCULAR:  No orthopnea, paroxysmal nocturnal dyspnea. GASTROINTESTINAL:  No nausea, vomiting, diarrhea. GENITOURINARY:  No dysuria or hematuria. PHYSICAL EXAMINATION:    VITAL SIGNS:  Temperature 98, pulse 56, respiration 18, blood pressure 119/64. HEENT:  Head and face:  No trauma, no injury. Pupils equal bilaterally. Conjunctivae were not anemic. Sclerae nonicteric. Eyes:  Fundi were normal.  External ears no deformity. Ear canals were patent. Eardrums were intact. Nose:  No congestion, polyp. Mouth, throat free. NECK:  No neck stiffness. No palpable nodes. No carotid bruit. CHEST:  Symmetrical.  LUNGS:  Rhonchi. HEART:  No murmur. Cardiac dullness was normal.  ABDOMEN:  Soft. No hepatosplenomegaly. No ascites. No hernia. EXTREMITIES:  No clubbing of fingers or lower leg edema. LABORATORY DATA:  CBC showed WBC 9000, RBC 4.87, hemoglobin 14.4, hematocrit 44.6. Electrolytes showed sodium 136, chloride 104, CO2 of 25, BUN 12, creatinine 0.81. IMPRESSION:    1. COVID pneumonia. 2.   Status post both knee replacement. 3.   Hypertension. 4.   Depression. SUGGESTIONS AND RECOMMENDATIONS:    1.   O2 protocol. 2.   Decadron 60 mg IV daily for 10 days. 3.   Remdesivir 200 mg first day followed by 100 mg daily for 4 days. Dictated By Indra Clement MD  d: 08/16/2022 09:37:03  t: 08/16/2022 11:48:09  Job [id number]  Y[facility]/

## 2022-08-17 LAB
ALBUMIN SERPL-MCNC: 3.3 G/DL (ref 3.4–5)
ALBUMIN/GLOB SERPL: 0.9 {RATIO} (ref 1–2)
ALP LIVER SERPL-CCNC: 62 U/L
ALT SERPL-CCNC: 32 U/L
ANION GAP SERPL CALC-SCNC: 4 MMOL/L (ref 0–18)
AST SERPL-CCNC: 22 U/L (ref 15–37)
BILIRUB SERPL-MCNC: 0.3 MG/DL (ref 0.1–2)
BUN BLD-MCNC: 17 MG/DL (ref 7–18)
BUN/CREAT SERPL: 30.4 (ref 10–20)
CALCIUM BLD-MCNC: 9 MG/DL (ref 8.5–10.1)
CHLORIDE SERPL-SCNC: 108 MMOL/L (ref 98–112)
CO2 SERPL-SCNC: 27 MMOL/L (ref 21–32)
CREAT BLD-MCNC: 0.56 MG/DL
DEPRECATED RDW RBC AUTO: 45.1 FL (ref 35.1–46.3)
ERYTHROCYTE [DISTWIDTH] IN BLOOD BY AUTOMATED COUNT: 13.2 % (ref 11–15)
GFR SERPLBLD BASED ON 1.73 SQ M-ARVRAT: 93 ML/MIN/1.73M2 (ref 60–?)
GLOBULIN PLAS-MCNC: 3.7 G/DL (ref 2.8–4.4)
GLUCOSE BLD-MCNC: 132 MG/DL (ref 70–99)
HCT VFR BLD AUTO: 45.4 %
HGB BLD-MCNC: 14.4 G/DL
MCH RBC QN AUTO: 29.4 PG (ref 26–34)
MCHC RBC AUTO-ENTMCNC: 31.7 G/DL (ref 31–37)
MCV RBC AUTO: 92.8 FL
OSMOLALITY SERPL CALC.SUM OF ELEC: 291 MOSM/KG (ref 275–295)
PLATELET # BLD AUTO: 247 10(3)UL (ref 150–450)
POTASSIUM SERPL-SCNC: 4.1 MMOL/L (ref 3.5–5.1)
PROT SERPL-MCNC: 7 G/DL (ref 6.4–8.2)
RBC # BLD AUTO: 4.89 X10(6)UL
SODIUM SERPL-SCNC: 139 MMOL/L (ref 136–145)
WBC # BLD AUTO: 9.5 X10(3) UL (ref 4–11)

## 2022-08-17 PROCEDURE — 80053 COMPREHEN METABOLIC PANEL: CPT | Performed by: EMERGENCY MEDICINE

## 2022-08-17 PROCEDURE — 85025 COMPLETE CBC W/AUTO DIFF WBC: CPT | Performed by: INTERNAL MEDICINE

## 2022-08-17 RX ORDER — ALPRAZOLAM 0.25 MG/1
0.25 TABLET ORAL 2 TIMES DAILY
Status: DISCONTINUED | OUTPATIENT
Start: 2022-08-17 | End: 2022-08-19

## 2022-08-17 NOTE — PLAN OF CARE
Problem: Patient Centered Care  Goal: Patient preferences are identified and integrated in the patient's plan of care  Description: Interventions:  - What would you like us to know as we care for you? I live at home with my   - Provide timely, complete, and accurate information to patient/family  - Incorporate patient and family knowledge, values, beliefs, and cultural backgrounds into the planning and delivery of care  - Encourage patient/family to participate in care and decision-making at the level they choose  - Honor patient and family perspectives and choices  Outcome: Progressing     Problem: Patient/Family Goals  Goal: Patient/Family Long Term Goal  Description: Patient's Long Term Goal: go home    Interventions:  - IV remdesivir  -MD plan   -oxygen monitoring  - See additional Care Plan goals for specific interventions  Outcome: Progressing  Goal: Patient/Family Short Term Goal  Description: Patient's Short Term Goal: stay on RA    Interventions:   - pulm consult  -Cont pulse ox monitoring  - See additional Care Plan goals for specific interventions  Outcome: Progressing     Problem: SAFETY ADULT - FALL  Goal: Free from fall injury  Description: INTERVENTIONS:  - Assess pt frequently for physical needs  - Identify cognitive and physical deficits and behaviors that affect risk of falls.   - [place] fall precautions as indicated by assessment.  - Educate pt/family on patient safety including physical limitations  - Instruct pt to call for assistance with activity based on assessment  - Modify environment to reduce risk of injury  - Provide assistive devices as appropriate  - Consider OT/PT consult to assist with strengthening/mobility  - Encourage toileting schedule  Outcome: Progressing     Problem: DISCHARGE PLANNING  Goal: Discharge to home or other facility with appropriate resources  Description: INTERVENTIONS:  - Identify barriers to discharge w/pt and caregiver  - Include patient/family/discharge partner in discharge planning  - Arrange for needed discharge resources and transportation as appropriate  - Identify discharge learning needs (meds, wound care, etc)  - Arrange for interpreters to assist at discharge as needed  - Consider post-discharge preferences of patient/family/discharge partner  - Complete POLST form as appropriate  - Assess patient's ability to be responsible for managing their own health  - Refer to Case Management Department for coordinating discharge planning if the patient needs post-hospital services based on physician/LIP order or complex needs related to functional status, cognitive ability or social support system  Outcome: Progressing     Problem: RESPIRATORY - ADULT  Goal: Achieves optimal ventilation and oxygenation  Description: INTERVENTIONS:  - Assess for changes in respiratory status  - Assess for changes in mentation and behavior  - Position to facilitate oxygenation and minimize respiratory effort  - Oxygen supplementation based on oxygen saturation or ABGs  - Provide Smoking Cessation handout, if applicable  - Encourage broncho-pulmonary hygiene including cough, deep breathe, Incentive Spirometry  - Assess the need for suctioning and perform as needed  - Assess and instruct to report SOB or any respiratory difficulty  - Respiratory Therapy support as indicated  - Manage/alleviate anxiety  - Monitor for signs/symptoms of CO2 retention  Outcome: Progressing     Problem: GASTROINTESTINAL - ADULT  Goal: Minimal or absence of nausea and vomiting  Description: INTERVENTIONS:  - Maintain adequate hydration with IV or PO as ordered and tolerated  - Nasogastric tube to low intermittent suction as ordered  - Evaluate effectiveness of ordered antiemetic medications  - Provide nonpharmacologic comfort measures as appropriate  - Advance diet as tolerated, if ordered  - Obtain nutritional consult as needed  - Evaluate fluid balance  Outcome: Progressing  No acute changes overnight. VSS, SpO2 92% and above on RA. Denies pain at this time. No c/o nausea. Call light within reach. Bed alarm on. Safety measures in place.

## 2022-08-17 NOTE — H&P
[facility]    PATIENT'S NAME: RAHDA MORALES   ATTENDING PHYSICIAN: Sascha Daniels MD   PATIENT ACCOUNT#:   [id number]    LOCATION:  [address] [place] [address] RECORD #:   [id number]       YOB: 1945  ADMISSION DATE:       08/15/2022    HISTORY AND PHYSICAL EXAMINATION    CHIEF COMPLAINT:  COVID-19 infection, shortness of breath, dehydration. HISTORY OF PRESENT ILLNESS:  The patient is a 64-year-old lady who is regularly seen by me in the office. The patient presents to the hospital with 3 to 4 days of worsening shortness of breath, cough, shortness of breath on lying down and also mild phlegm. Patient upon arrival in the ER tested positive for COVID-19 and patient cleared for discharge but was noted to be hypoxemic just prior to discharge and was hence retained in the hospital and admitted. At the time of my evaluation, patient denies any chest pain, any hemoptysis. PAST MEDICAL HISTORY:  Relevant for history CAD, history of hypertension, history of obesity, history of hyperlipidemia, history of osteoarthritis, history of prediabetes, history of multi-joint arthritis. PAST SURGICAL HISTORY:  Relevant for appendectomy, cataract, history of CAD status post stent, history of angioplasty, history of colonoscopy, history of carpal tunnel release, history of shoulder repair. MEDICATIONS:  Patient's home medications include the following:  Tylenol No. 3 one tablet every 6 hours as needed, amlodipine 2.5 mg orally daily, duloxetine 60 mg orally daily, latanoprost 0.005% ophthalmic one tablet orally twice daily, aspirin 81 mg orally daily, gabapentin 400 mg orally twice daily, Crestor 5 mg orally daily, Caltrate 600 mg orally daily, Tylenol 500 orally 3 times daily, Celebrex 200 orally 1 capsule daily. ALLERGIES:  Patient has allergies to iodine and tositumomab.     FAMILY HISTORY:  Significant for heart disease in the father and DJD in mother and diabetes in brother and breast cancer in sister. SOCIAL HISTORY:  No alcohol, no drugs, no tobacco.  Patient lives with her . REVIEW OF SYSTEMS:  A 13-point review of systems was performed and was negative except as per HPI. PHYSICAL EXAMINATION:    VITAL SIGNS:  Blood pressure 119/64, pulse of 56, respiratory rate of 18. Height of 152.4 cm, weight of 190 pounds. Pulse ox of 96%. NECK:  No JVD. No bruit. No lymphadenopathy. LUNGS:   Decreased air entry bilaterally at the bases. HEART:  S1, S2 heard. No S3. No S4. No murmur. No rub. No gallop. ABDOMEN:  Soft. Morbidly obese. No hepatosplenomegaly. EXTREMITIES:  With trace edema. NEUROLOGIC:  Alert and oriented x3. No deficit. LABORATORY DATA:  Sodium of 136, potassium of 3.3, chloride of 104, bicarbonate of 25, BUN of 12, creatinine 0.81, glucose 129. Chest x-ray was performed and showed suboptimal inspiration, mild bibasilar atelectasis. No airspace consolidation. Mild cardiomegaly. Normal pulmonary vascularity. SARS COVID-19 was negative. ASSESSMENT AND PLAN:    1. COVID-19 pneumonia with hypoxemia. Patient has mild interstitial changes, does not clearly have lobar pneumonia and has multiple other risks factors of advanced age and prediabetes. Patient will be treated for active COVID infection with antiviral.  We will also start steroid. Patient will also be evaluated by Infectious Disease. Patient will be on isolation. 2.   Multi-joint arthritis. Patient is on Tylenol No. 3 and we will continue the same. 3.   Osteopenia. Patient is on alendronate and we will continue the same. 4.   Chronic pain. Patient is on duloxetine, gabapentin and we will get Tylenol No. 3 as needed. 5.   Hyperlipidemia. Patient is on Crestor and we will continue the same. 6.   DVT prophylaxis. Patient will be on Lovenox. 7.   GI prophylaxis. Patient will be on Protonix. Total time spent seeing patient was 70 minutes.     Dictated By Fco Logan Demetri Orr MD  d: 08/16/2022 17:14:49  t: 08/17/2022 02:34:32  [facility] [id number]  MV/

## 2022-08-17 NOTE — PLAN OF CARE
Problem: Patient Centered Care  Goal: Patient preferences are identified and integrated in the patient's plan of care  Description: Interventions:  - What would you like us to know as we care for you? I live at home with my   - Provide timely, complete, and accurate information to patient/family  - Incorporate patient and family knowledge, values, beliefs, and cultural backgrounds into the planning and delivery of care  - Encourage patient/family to participate in care and decision-making at the level they choose  - Honor patient and family perspectives and choices  Outcome: Progressing     Problem: SAFETY ADULT - FALL  Goal: Free from fall injury  Description: INTERVENTIONS:  - Assess pt frequently for physical needs  - Identify cognitive and physical deficits and behaviors that affect risk of falls.   - [place] fall precautions as indicated by assessment.  - Educate pt/family on patient safety including physical limitations  - Instruct pt to call for assistance with activity based on assessment  - Modify environment to reduce risk of injury  - Provide assistive devices as appropriate  - Consider OT/PT consult to assist with strengthening/mobility  - Encourage toileting schedule  Outcome: Progressing     Problem: DISCHARGE PLANNING  Goal: Discharge to home or other facility with appropriate resources  Description: INTERVENTIONS:  - Identify barriers to discharge w/pt and caregiver  - Include patient/family/discharge partner in discharge planning  - Arrange for needed discharge resources and transportation as appropriate  - Identify discharge learning needs (meds, wound care, etc)  - Arrange for interpreters to assist at discharge as needed  - Consider post-discharge preferences of patient/family/discharge partner  - Complete POLST form as appropriate  - Assess patient's ability to be responsible for managing their own health  - Refer to Case Management Department for coordinating discharge planning if the patient needs post-hospital services based on physician/LIP order or complex needs related to functional status, cognitive ability or social support system  Outcome: Progressing     Problem: RESPIRATORY - ADULT  Goal: Achieves optimal ventilation and oxygenation  Description: INTERVENTIONS:  - Assess for changes in respiratory status  - Assess for changes in mentation and behavior  - Position to facilitate oxygenation and minimize respiratory effort  - Oxygen supplementation based on oxygen saturation or ABGs  - Provide Smoking Cessation handout, if applicable  - Encourage broncho-pulmonary hygiene including cough, deep breathe, Incentive Spirometry  - Assess the need for suctioning and perform as needed  - Assess and instruct to report SOB or any respiratory difficulty  - Respiratory Therapy support as indicated  - Manage/alleviate anxiety  - Monitor for signs/symptoms of CO2 retention  Outcome: Progressing     Problem: GASTROINTESTINAL - ADULT  Goal: Minimal or absence of nausea and vomiting  Description: INTERVENTIONS:  - Maintain adequate hydration with IV or PO as ordered and tolerated  - Nasogastric tube to low intermittent suction as ordered  - Evaluate effectiveness of ordered antiemetic medications  - Provide nonpharmacologic comfort measures as appropriate  - Advance diet as tolerated, if ordered  - Obtain nutritional consult as needed  - Evaluate fluid balance  Outcome: Progressing     Patient A/Ox4, resting in bed, encourage activity. Patient is on Room air spo2 95%, ambulates in room and to bathroom spo2 continues 95% with activity. Pain managed, complaining of headache and chest discomfort from frequent cough. Remdesivir day 3. Isolation. Fall precaution. Call light within reach.

## 2022-08-18 LAB
ALBUMIN SERPL-MCNC: 3.2 G/DL (ref 3.4–5)
ALBUMIN/GLOB SERPL: 0.9 {RATIO} (ref 1–2)
ALP LIVER SERPL-CCNC: 56 U/L
ALT SERPL-CCNC: 32 U/L
ANION GAP SERPL CALC-SCNC: 5 MMOL/L (ref 0–18)
AST SERPL-CCNC: 18 U/L (ref 15–37)
BILIRUB SERPL-MCNC: 0.2 MG/DL (ref 0.1–2)
BUN BLD-MCNC: 14 MG/DL (ref 7–18)
BUN/CREAT SERPL: 28.6 (ref 10–20)
CALCIUM BLD-MCNC: 8.4 MG/DL (ref 8.5–10.1)
CHLORIDE SERPL-SCNC: 108 MMOL/L (ref 98–112)
CO2 SERPL-SCNC: 26 MMOL/L (ref 21–32)
CREAT BLD-MCNC: 0.49 MG/DL
GFR SERPLBLD BASED ON 1.73 SQ M-ARVRAT: 96 ML/MIN/1.73M2 (ref 60–?)
GLOBULIN PLAS-MCNC: 3.4 G/DL (ref 2.8–4.4)
GLUCOSE BLD-MCNC: 122 MG/DL (ref 70–99)
OSMOLALITY SERPL CALC.SUM OF ELEC: 290 MOSM/KG (ref 275–295)
POTASSIUM SERPL-SCNC: 4 MMOL/L (ref 3.5–5.1)
PROT SERPL-MCNC: 6.6 G/DL (ref 6.4–8.2)
SODIUM SERPL-SCNC: 139 MMOL/L (ref 136–145)

## 2022-08-18 PROCEDURE — 80053 COMPREHEN METABOLIC PANEL: CPT | Performed by: EMERGENCY MEDICINE

## 2022-08-18 NOTE — PLAN OF CARE
Problem: Patient Centered Care  Goal: Patient preferences are identified and integrated in the patient's plan of care  Description: Interventions:  - What would you like us to know as we care for you? I live at home with my   - Provide timely, complete, and accurate information to patient/family  - Incorporate patient and family knowledge, values, beliefs, and cultural backgrounds into the planning and delivery of care  - Encourage patient/family to participate in care and decision-making at the level they choose  - Honor patient and family perspectives and choices  Outcome: Progressing     Problem: SAFETY ADULT - FALL  Goal: Free from fall injury  Description: INTERVENTIONS:  - Assess pt frequently for physical needs  - Identify cognitive and physical deficits and behaviors that affect risk of falls.   - [place] fall precautions as indicated by assessment.  - Educate pt/family on patient safety including physical limitations  - Instruct pt to call for assistance with activity based on assessment  - Modify environment to reduce risk of injury  - Provide assistive devices as appropriate  - Consider OT/PT consult to assist with strengthening/mobility  - Encourage toileting schedule  Outcome: Progressing     Problem: DISCHARGE PLANNING  Goal: Discharge to home or other facility with appropriate resources  Description: INTERVENTIONS:  - Identify barriers to discharge w/pt and caregiver  - Include patient/family/discharge partner in discharge planning  - Arrange for needed discharge resources and transportation as appropriate  - Identify discharge learning needs (meds, wound care, etc)  - Arrange for interpreters to assist at discharge as needed  - Consider post-discharge preferences of patient/family/discharge partner  - Complete POLST form as appropriate  - Assess patient's ability to be responsible for managing their own health  - Refer to Case Management Department for coordinating discharge planning if the patient needs post-hospital services based on physician/LIP order or complex needs related to functional status, cognitive ability or social support system  Outcome: Progressing     Problem: RESPIRATORY - ADULT  Goal: Achieves optimal ventilation and oxygenation  Description: INTERVENTIONS:  - Assess for changes in respiratory status  - Assess for changes in mentation and behavior  - Position to facilitate oxygenation and minimize respiratory effort  - Oxygen supplementation based on oxygen saturation or ABGs  - Provide Smoking Cessation handout, if applicable  - Encourage broncho-pulmonary hygiene including cough, deep breathe, Incentive Spirometry  - Assess the need for suctioning and perform as needed  - Assess and instruct to report SOB or any respiratory difficulty  - Respiratory Therapy support as indicated  - Manage/alleviate anxiety  - Monitor for signs/symptoms of CO2 retention  Outcome: Progressing     Problem: GASTROINTESTINAL - ADULT  Goal: Minimal or absence of nausea and vomiting  Description: INTERVENTIONS:  - Maintain adequate hydration with IV or PO as ordered and tolerated  - Nasogastric tube to low intermittent suction as ordered  - Evaluate effectiveness of ordered antiemetic medications  - Provide nonpharmacologic comfort measures as appropriate  - Advance diet as tolerated, if ordered  - Obtain nutritional consult as needed  - Evaluate fluid balance  Outcome: Progressing     Patient is doing better, continues to be on room air. Frequently ambulates in room, safety use of walker, fall precaution. Possible discharge today.

## 2022-08-18 NOTE — PLAN OF CARE
Problem: Patient Centered Care  Goal: Patient preferences are identified and integrated in the patient's plan of care  Description: Interventions:  - What would you like us to know as we care for you? I live at home with my   - Provide timely, complete, and accurate information to patient/family  - Incorporate patient and family knowledge, values, beliefs, and cultural backgrounds into the planning and delivery of care  - Encourage patient/family to participate in care and decision-making at the level they choose  - Honor patient and family perspectives and choices  Outcome: Progressing     Problem: Patient/Family Goals  Goal: Patient/Family Long Term Goal  Description: Patient's Long Term Goal: go home    Interventions:  - IV remdesivir  -MD plan   -oxygen monitoring  - See additional Care Plan goals for specific interventions  Outcome: Progressing  Goal: Patient/Family Short Term Goal  Description: Patient's Short Term Goal: stay on RA    Interventions:   - pulm consult  -Cont pulse ox monitoring  - See additional Care Plan goals for specific interventions  Outcome: Progressing     Problem: SAFETY ADULT - FALL  Goal: Free from fall injury  Description: INTERVENTIONS:  - Assess pt frequently for physical needs  - Identify cognitive and physical deficits and behaviors that affect risk of falls.   - [place] fall precautions as indicated by assessment.  - Educate pt/family on patient safety including physical limitations  - Instruct pt to call for assistance with activity based on assessment  - Modify environment to reduce risk of injury  - Provide assistive devices as appropriate  - Consider OT/PT consult to assist with strengthening/mobility  - Encourage toileting schedule  Outcome: Progressing     Problem: DISCHARGE PLANNING  Goal: Discharge to home or other facility with appropriate resources  Description: INTERVENTIONS:  - Identify barriers to discharge w/pt and caregiver  - Include patient/family/discharge partner in discharge planning  - Arrange for needed discharge resources and transportation as appropriate  - Identify discharge learning needs (meds, wound care, etc)  - Arrange for interpreters to assist at discharge as needed  - Consider post-discharge preferences of patient/family/discharge partner  - Complete POLST form as appropriate  - Assess patient's ability to be responsible for managing their own health  - Refer to Case Management Department for coordinating discharge planning if the patient needs post-hospital services based on physician/LIP order or complex needs related to functional status, cognitive ability or social support system  Outcome: Progressing     Problem: RESPIRATORY - ADULT  Goal: Achieves optimal ventilation and oxygenation  Description: INTERVENTIONS:  - Assess for changes in respiratory status  - Assess for changes in mentation and behavior  - Position to facilitate oxygenation and minimize respiratory effort  - Oxygen supplementation based on oxygen saturation or ABGs  - Provide Smoking Cessation handout, if applicable  - Encourage broncho-pulmonary hygiene including cough, deep breathe, Incentive Spirometry  - Assess the need for suctioning and perform as needed  - Assess and instruct to report SOB or any respiratory difficulty  - Respiratory Therapy support as indicated  - Manage/alleviate anxiety  - Monitor for signs/symptoms of CO2 retention  Outcome: Progressing     Problem: GASTROINTESTINAL - ADULT  Goal: Minimal or absence of nausea and vomiting  Description: INTERVENTIONS:  - Maintain adequate hydration with IV or PO as ordered and tolerated  - Nasogastric tube to low intermittent suction as ordered  - Evaluate effectiveness of ordered antiemetic medications  - Provide nonpharmacologic comfort measures as appropriate  - Advance diet as tolerated, if ordered  - Obtain nutritional consult as needed  - Evaluate fluid balance  Outcome: Progressing Patient is resting comfortably in bed, denies pain, on R/A, VSS, call light within reach, all needs met at this time

## 2022-08-19 VITALS
WEIGHT: 190.5 LBS | OXYGEN SATURATION: 96 % | TEMPERATURE: 98 F | HEART RATE: 70 BPM | DIASTOLIC BLOOD PRESSURE: 81 MMHG | SYSTOLIC BLOOD PRESSURE: 134 MMHG | RESPIRATION RATE: 16 BRPM | HEIGHT: 60 IN | BODY MASS INDEX: 37.4 KG/M2

## 2022-08-19 LAB
ALBUMIN SERPL-MCNC: 3.1 G/DL (ref 3.4–5)
ALBUMIN/GLOB SERPL: 1 {RATIO} (ref 1–2)
ALP LIVER SERPL-CCNC: 54 U/L
ALT SERPL-CCNC: 32 U/L
ANION GAP SERPL CALC-SCNC: 6 MMOL/L (ref 0–18)
AST SERPL-CCNC: 14 U/L (ref 15–37)
BILIRUB SERPL-MCNC: 0.2 MG/DL (ref 0.1–2)
BUN BLD-MCNC: 15 MG/DL (ref 7–18)
BUN/CREAT SERPL: 31.3 (ref 10–20)
CALCIUM BLD-MCNC: 8.7 MG/DL (ref 8.5–10.1)
CHLORIDE SERPL-SCNC: 107 MMOL/L (ref 98–112)
CO2 SERPL-SCNC: 27 MMOL/L (ref 21–32)
CREAT BLD-MCNC: 0.48 MG/DL
GFR SERPLBLD BASED ON 1.73 SQ M-ARVRAT: 96 ML/MIN/1.73M2 (ref 60–?)
GLOBULIN PLAS-MCNC: 3.2 G/DL (ref 2.8–4.4)
GLUCOSE BLD-MCNC: 122 MG/DL (ref 70–99)
OSMOLALITY SERPL CALC.SUM OF ELEC: 292 MOSM/KG (ref 275–295)
POTASSIUM SERPL-SCNC: 4.2 MMOL/L (ref 3.5–5.1)
PROT SERPL-MCNC: 6.3 G/DL (ref 6.4–8.2)
SODIUM SERPL-SCNC: 140 MMOL/L (ref 136–145)

## 2022-08-19 PROCEDURE — 80053 COMPREHEN METABOLIC PANEL: CPT | Performed by: EMERGENCY MEDICINE

## 2022-08-19 RX ORDER — DEXAMETHASONE 4 MG/1
6 TABLET ORAL
Qty: 5 TABLET | Refills: 0 | Status: SHIPPED | OUTPATIENT
Start: 2022-08-19 | End: 2022-08-24

## 2022-08-19 NOTE — DIETARY NOTE
Brief Nutrition Note:    Pt admitted for Hypokalemia and Pneumonia d/t Covid. Pt screened no nutrition risk by RN at admission. Pt re-screened r/t +Covid. Pt tolerating diet with good po intake, averaging %. EMR wt review, relatively wt stable. Remdesivir completed per MD. Pt continues at no nutrition risk at this time. F/u at LOS. Please consult nutrition services if earlier intervention is indicated.     Wt Readings from Last 6 Encounters:  08/15/22 : 86.4 kg (190 lb 8 oz)  09/07/18 : 90.3 kg (199 lb)  04/30/10 : 87.5 kg (193 lb)  01/27/10 : 87.5 kg (193 lb)  05/28/09 : 82.1 kg (181 lb)  12/17/08 : 83.5 kg (184 lb)    Percent Meals Eaten (last 3 days)     Date/Time Percent Meals Eaten (%)    08/16/22 1050 100 %    08/16/22 1415 100 %    08/17/22 1000 100 %    08/17/22 1500 60 %    08/18/22 1028 90 %    08/18/22 1555 100 %    08/18/22 1847 100 %        Teddy Tabor MS, SHAYY, RDN, LDN  Clinical Dietitian  P: [phone]

## 2022-08-19 NOTE — PLAN OF CARE
Problem: Patient Centered Care  Goal: Patient preferences are identified and integrated in the patient's plan of care  Description: Interventions:  - What would you like us to know as we care for you? I live at home with my   - Provide timely, complete, and accurate information to patient/family  - Incorporate patient and family knowledge, values, beliefs, and cultural backgrounds into the planning and delivery of care  - Encourage patient/family to participate in care and decision-making at the level they choose  - Honor patient and family perspectives and choices  Outcome: Progressing     Problem: Patient/Family Goals  Goal: Patient/Family Long Term Goal  Description: Patient's Long Term Goal: go home    Interventions:  - IV remdesivir  -MD plan   -oxygen monitoring  - See additional Care Plan goals for specific interventions  Outcome: Progressing  Goal: Patient/Family Short Term Goal  Description: Patient's Short Term Goal: stay on RA    Interventions:   - pulm consult  -Cont pulse ox monitoring  - See additional Care Plan goals for specific interventions  Outcome: Progressing     Problem: SAFETY ADULT - FALL  Goal: Free from fall injury  Description: INTERVENTIONS:  - Assess pt frequently for physical needs  - Identify cognitive and physical deficits and behaviors that affect risk of falls.   - [place] fall precautions as indicated by assessment.  - Educate pt/family on patient safety including physical limitations  - Instruct pt to call for assistance with activity based on assessment  - Modify environment to reduce risk of injury  - Provide assistive devices as appropriate  - Consider OT/PT consult to assist with strengthening/mobility  - Encourage toileting schedule  Outcome: Progressing     Problem: DISCHARGE PLANNING  Goal: Discharge to home or other facility with appropriate resources  Description: INTERVENTIONS:  - Identify barriers to discharge w/pt and caregiver  - Include patient/family/discharge partner in discharge planning  - Arrange for needed discharge resources and transportation as appropriate  - Identify discharge learning needs (meds, wound care, etc)  - Arrange for interpreters to assist at discharge as needed  - Consider post-discharge preferences of patient/family/discharge partner  - Complete POLST form as appropriate  - Assess patient's ability to be responsible for managing their own health  - Refer to Case Management Department for coordinating discharge planning if the patient needs post-hospital services based on physician/LIP order or complex needs related to functional status, cognitive ability or social support system  Outcome: Progressing     Problem: RESPIRATORY - ADULT  Goal: Achieves optimal ventilation and oxygenation  Description: INTERVENTIONS:  - Assess for changes in respiratory status  - Assess for changes in mentation and behavior  - Position to facilitate oxygenation and minimize respiratory effort  - Oxygen supplementation based on oxygen saturation or ABGs  - Provide Smoking Cessation handout, if applicable  - Encourage broncho-pulmonary hygiene including cough, deep breathe, Incentive Spirometry  - Assess the need for suctioning and perform as needed  - Assess and instruct to report SOB or any respiratory difficulty  - Respiratory Therapy support as indicated  - Manage/alleviate anxiety  - Monitor for signs/symptoms of CO2 retention  Outcome: Progressing     Problem: GASTROINTESTINAL - ADULT  Goal: Minimal or absence of nausea and vomiting  Description: INTERVENTIONS:  - Maintain adequate hydration with IV or PO as ordered and tolerated  - Nasogastric tube to low intermittent suction as ordered  - Evaluate effectiveness of ordered antiemetic medications  - Provide nonpharmacologic comfort measures as appropriate  - Advance diet as tolerated, if ordered  - Obtain nutritional consult as needed  - Evaluate fluid balance  Outcome: Progressing Patient is resting comfortably in bed, prn pain medication, VSS, on R/A @ 96%, call light within reach, safety /fall precautions in place, all needs met at this time.

## 2022-10-22 ENCOUNTER — LAB ENCOUNTER (OUTPATIENT)
Dept: LAB | Age: 77
End: 2022-10-22
Attending: INTERNAL MEDICINE
Payer: MEDICARE

## 2022-10-22 DIAGNOSIS — E78.5 HYPERLIPIDEMIA: ICD-10-CM

## 2022-10-22 DIAGNOSIS — U07.1 INFECTION DUE TO 2019 NOVEL CORONAVIRUS: ICD-10-CM

## 2022-10-22 DIAGNOSIS — R73.03 BORDERLINE DIABETES: Primary | ICD-10-CM

## 2022-10-22 LAB
ALBUMIN SERPL-MCNC: 4 G/DL (ref 3.4–5)
ALBUMIN/GLOB SERPL: 1.2 {RATIO} (ref 1–2)
ALP LIVER SERPL-CCNC: 66 U/L
ALT SERPL-CCNC: 33 U/L
ANION GAP SERPL CALC-SCNC: 7 MMOL/L (ref 0–18)
AST SERPL-CCNC: 20 U/L (ref 15–37)
BASOPHILS # BLD AUTO: 0.07 X10(3) UL (ref 0–0.2)
BASOPHILS NFR BLD AUTO: 0.9 %
BILIRUB SERPL-MCNC: 0.6 MG/DL (ref 0.1–2)
BILIRUB UR QL: NEGATIVE
BUN BLD-MCNC: 8 MG/DL (ref 7–18)
BUN/CREAT SERPL: 12.3 (ref 10–20)
CALCIUM BLD-MCNC: 10.3 MG/DL (ref 8.5–10.1)
CHLORIDE SERPL-SCNC: 105 MMOL/L (ref 98–112)
CHOLEST SERPL-MCNC: 139 MG/DL (ref ?–200)
CK SERPL-CCNC: 85 U/L
CO2 SERPL-SCNC: 27 MMOL/L (ref 21–32)
COLOR UR: YELLOW
CREAT BLD-MCNC: 0.65 MG/DL
DEPRECATED RDW RBC AUTO: 43.5 FL (ref 35.1–46.3)
EOSINOPHIL # BLD AUTO: 0.19 X10(3) UL (ref 0–0.7)
EOSINOPHIL NFR BLD AUTO: 2.4 %
ERYTHROCYTE [DISTWIDTH] IN BLOOD BY AUTOMATED COUNT: 12.8 % (ref 11–15)
EST. AVERAGE GLUCOSE BLD GHB EST-MCNC: 123 MG/DL (ref 68–126)
FASTING PATIENT LIPID ANSWER: YES
FASTING STATUS PATIENT QL REPORTED: YES
GFR SERPLBLD BASED ON 1.73 SQ M-ARVRAT: 90 ML/MIN/1.73M2 (ref 60–?)
GLOBULIN PLAS-MCNC: 3.4 G/DL (ref 2.8–4.4)
GLUCOSE BLD-MCNC: 108 MG/DL (ref 70–99)
GLUCOSE UR-MCNC: NEGATIVE MG/DL
HBA1C MFR BLD: 5.9 % (ref ?–5.7)
HCT VFR BLD AUTO: 46.3 %
HDLC SERPL-MCNC: 38 MG/DL (ref 40–59)
HGB BLD-MCNC: 15 G/DL
IMM GRANULOCYTES # BLD AUTO: 0.03 X10(3) UL (ref 0–1)
IMM GRANULOCYTES NFR BLD: 0.4 %
KETONES UR-MCNC: NEGATIVE MG/DL
LDLC SERPL CALC-MCNC: 74 MG/DL (ref ?–100)
LYMPHOCYTES # BLD AUTO: 2.73 X10(3) UL (ref 1–4)
LYMPHOCYTES NFR BLD AUTO: 34.8 %
MCH RBC QN AUTO: 29.8 PG (ref 26–34)
MCHC RBC AUTO-ENTMCNC: 32.4 G/DL (ref 31–37)
MCV RBC AUTO: 92 FL
MONOCYTES # BLD AUTO: 0.86 X10(3) UL (ref 0.1–1)
MONOCYTES NFR BLD AUTO: 11 %
NEUTROPHILS # BLD AUTO: 3.97 X10 (3) UL (ref 1.5–7.7)
NEUTROPHILS # BLD AUTO: 3.97 X10(3) UL (ref 1.5–7.7)
NEUTROPHILS NFR BLD AUTO: 50.5 %
NITRITE UR QL STRIP.AUTO: POSITIVE
NONHDLC SERPL-MCNC: 101 MG/DL (ref ?–130)
OSMOLALITY SERPL CALC.SUM OF ELEC: 287 MOSM/KG (ref 275–295)
PH UR: 7 [PH] (ref 5–8)
PLATELET # BLD AUTO: 317 10(3)UL (ref 150–450)
POTASSIUM SERPL-SCNC: 4.1 MMOL/L (ref 3.5–5.1)
PROT SERPL-MCNC: 7.4 G/DL (ref 6.4–8.2)
PROT UR-MCNC: 30 MG/DL
RBC # BLD AUTO: 5.03 X10(6)UL
SODIUM SERPL-SCNC: 139 MMOL/L (ref 136–145)
SP GR UR STRIP: 1.01 (ref 1–1.03)
T4 FREE SERPL-MCNC: 0.9 NG/DL (ref 0.8–1.7)
TRIGL SERPL-MCNC: 158 MG/DL (ref 30–149)
TSI SER-ACNC: 2.72 MIU/ML (ref 0.36–3.74)
UROBILINOGEN UR STRIP-ACNC: <2
VIT C UR-MCNC: NEGATIVE MG/DL
VLDLC SERPL CALC-MCNC: 24 MG/DL (ref 0–30)
WBC # BLD AUTO: 7.9 X10(3) UL (ref 4–11)
WBC #/AREA URNS AUTO: >50 /HPF
WBC CLUMPS UR QL AUTO: PRESENT /HPF
YEAST UR QL: PRESENT /HPF

## 2022-10-22 PROCEDURE — 87077 CULTURE AEROBIC IDENTIFY: CPT

## 2022-10-22 PROCEDURE — 36415 COLL VENOUS BLD VENIPUNCTURE: CPT

## 2022-10-22 PROCEDURE — 81001 URINALYSIS AUTO W/SCOPE: CPT

## 2022-10-22 PROCEDURE — 87086 URINE CULTURE/COLONY COUNT: CPT

## 2022-10-22 PROCEDURE — 83036 HEMOGLOBIN GLYCOSYLATED A1C: CPT

## 2022-10-22 PROCEDURE — 87186 SC STD MICRODIL/AGAR DIL: CPT

## 2022-10-22 PROCEDURE — 82550 ASSAY OF CK (CPK): CPT

## 2022-10-22 PROCEDURE — 80053 COMPREHEN METABOLIC PANEL: CPT

## 2022-10-22 PROCEDURE — 85025 COMPLETE CBC W/AUTO DIFF WBC: CPT

## 2022-10-22 PROCEDURE — 84439 ASSAY OF FREE THYROXINE: CPT

## 2022-10-22 PROCEDURE — 84443 ASSAY THYROID STIM HORMONE: CPT

## 2022-10-22 PROCEDURE — 80061 LIPID PANEL: CPT

## 2022-11-08 ENCOUNTER — APPOINTMENT (OUTPATIENT)
Dept: GENERAL RADIOLOGY | Facility: HOSPITAL | Age: 77
End: 2022-11-08
Attending: EMERGENCY MEDICINE
Payer: MEDICARE

## 2022-11-08 ENCOUNTER — HOSPITAL ENCOUNTER (EMERGENCY)
Facility: HOSPITAL | Age: 77
Discharge: HOME | End: 2022-11-08
Attending: EMERGENCY MEDICINE
Payer: MEDICARE

## 2022-11-08 VITALS
TEMPERATURE: 98 F | OXYGEN SATURATION: 98 % | HEART RATE: 80 BPM | RESPIRATION RATE: 18 BRPM | DIASTOLIC BLOOD PRESSURE: 76 MMHG | SYSTOLIC BLOOD PRESSURE: 122 MMHG

## 2022-11-08 DIAGNOSIS — S20.219A CONTUSION OF CHEST WALL, UNSPECIFIED LATERALITY, INITIAL ENCOUNTER: ICD-10-CM

## 2022-11-08 DIAGNOSIS — S52.125A CLOSED NONDISPLACED FRACTURE OF HEAD OF LEFT RADIUS, INITIAL ENCOUNTER: Primary | ICD-10-CM
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
## 2022-11-08 PROCEDURE — 73080 X-RAY EXAM OF ELBOW: CPT | Performed by: EMERGENCY MEDICINE

## 2022-11-08 PROCEDURE — 99284 EMERGENCY DEPT VISIT MOD MDM: CPT

## 2022-11-08 PROCEDURE — 71101 X-RAY EXAM UNILAT RIBS/CHEST: CPT | Performed by: EMERGENCY MEDICINE

## 2022-11-08 NOTE — ED INITIAL ASSESSMENT (HPI)
PATIENT AOX3 TO ED VIA PRIVATE VEHICLE PATIENT REPORTED MECH FALL X YESTERDAY WITH HANDS OUTSTRETCHED +ABRASION NOTED TO PATIENT'S BILATERAL HANDS. +CO OF LEFT ARM PAIN BILATERAL RIB PAINS.  DENIES LOC/HEAD INJURY

## 2023-01-28 ENCOUNTER — LAB ENCOUNTER (OUTPATIENT)
Dept: LAB | Age: 78
End: 2023-01-28
Attending: INTERNAL MEDICINE
Payer: MEDICARE

## 2023-01-28 DIAGNOSIS — I10 ESSENTIAL HYPERTENSION, MALIGNANT: ICD-10-CM

## 2023-01-28 DIAGNOSIS — R73.03 BORDERLINE DIABETES: Primary | ICD-10-CM

## 2023-01-28 DIAGNOSIS — E55.9 AVITAMINOSIS D: ICD-10-CM

## 2023-01-28 DIAGNOSIS — E66.9 OBESITY, UNSPECIFIED: ICD-10-CM

## 2023-01-28 LAB
ALBUMIN SERPL-MCNC: 3.8 G/DL (ref 3.4–5)
ALBUMIN/GLOB SERPL: 1.1 {RATIO} (ref 1–2)
ALP LIVER SERPL-CCNC: 96 U/L
ALT SERPL-CCNC: 24 U/L
ANION GAP SERPL CALC-SCNC: 3 MMOL/L (ref 0–18)
AST SERPL-CCNC: 17 U/L (ref 15–37)
BASOPHILS # BLD AUTO: 0.06 X10(3) UL (ref 0–0.2)
BASOPHILS NFR BLD AUTO: 0.8 %
BILIRUB SERPL-MCNC: 0.5 MG/DL (ref 0.1–2)
BILIRUB UR QL: NEGATIVE
BUN BLD-MCNC: 9 MG/DL (ref 7–18)
BUN/CREAT SERPL: 13.6 (ref 10–20)
CALCIUM BLD-MCNC: 9.8 MG/DL (ref 8.5–10.1)
CHLORIDE SERPL-SCNC: 106 MMOL/L (ref 98–112)
CHOLEST SERPL-MCNC: 134 MG/DL (ref ?–200)
CLARITY UR: CLEAR
CO2 SERPL-SCNC: 31 MMOL/L (ref 21–32)
COLOR UR: YELLOW
CREAT BLD-MCNC: 0.66 MG/DL
DEPRECATED RDW RBC AUTO: 45.3 FL (ref 35.1–46.3)
EOSINOPHIL # BLD AUTO: 0.17 X10(3) UL (ref 0–0.7)
EOSINOPHIL NFR BLD AUTO: 2.2 %
ERYTHROCYTE [DISTWIDTH] IN BLOOD BY AUTOMATED COUNT: 13.3 % (ref 11–15)
EST. AVERAGE GLUCOSE BLD GHB EST-MCNC: 126 MG/DL (ref 68–126)
FASTING PATIENT LIPID ANSWER: YES
FASTING STATUS PATIENT QL REPORTED: YES
GFR SERPLBLD BASED ON 1.73 SQ M-ARVRAT: 89 ML/MIN/1.73M2 (ref 60–?)
GLOBULIN PLAS-MCNC: 3.5 G/DL (ref 2.8–4.4)
GLUCOSE BLD-MCNC: 116 MG/DL (ref 70–99)
GLUCOSE UR-MCNC: NEGATIVE MG/DL
HBA1C MFR BLD: 6 % (ref ?–5.7)
HCT VFR BLD AUTO: 47.6 %
HDLC SERPL-MCNC: 45 MG/DL (ref 40–59)
HGB BLD-MCNC: 14.9 G/DL
HGB UR QL STRIP.AUTO: NEGATIVE
HYALINE CASTS #/AREA URNS AUTO: PRESENT /LPF
IMM GRANULOCYTES # BLD AUTO: 0.03 X10(3) UL (ref 0–1)
IMM GRANULOCYTES NFR BLD: 0.4 %
KETONES UR-MCNC: NEGATIVE MG/DL
LDLC SERPL CALC-MCNC: 70 MG/DL (ref ?–100)
LYMPHOCYTES # BLD AUTO: 2.46 X10(3) UL (ref 1–4)
LYMPHOCYTES NFR BLD AUTO: 31.5 %
MCH RBC QN AUTO: 28.8 PG (ref 26–34)
MCHC RBC AUTO-ENTMCNC: 31.3 G/DL (ref 31–37)
MCV RBC AUTO: 92.1 FL
MONOCYTES # BLD AUTO: 0.71 X10(3) UL (ref 0.1–1)
MONOCYTES NFR BLD AUTO: 9.1 %
NEUTROPHILS # BLD AUTO: 4.37 X10 (3) UL (ref 1.5–7.7)
NEUTROPHILS # BLD AUTO: 4.37 X10(3) UL (ref 1.5–7.7)
NEUTROPHILS NFR BLD AUTO: 56 %
NITRITE UR QL STRIP.AUTO: NEGATIVE
NONHDLC SERPL-MCNC: 89 MG/DL (ref ?–130)
OSMOLALITY SERPL CALC.SUM OF ELEC: 290 MOSM/KG (ref 275–295)
PH UR: 7 [PH] (ref 5–8)
PLATELET # BLD AUTO: 309 10(3)UL (ref 150–450)
POTASSIUM SERPL-SCNC: 4.5 MMOL/L (ref 3.5–5.1)
PROT SERPL-MCNC: 7.3 G/DL (ref 6.4–8.2)
PROT UR-MCNC: NEGATIVE MG/DL
RBC # BLD AUTO: 5.17 X10(6)UL
SODIUM SERPL-SCNC: 140 MMOL/L (ref 136–145)
SP GR UR STRIP: 1.02 (ref 1–1.03)
TRIGL SERPL-MCNC: 103 MG/DL (ref 30–149)
URATE SERPL-MCNC: 4.2 MG/DL
UROBILINOGEN UR STRIP-ACNC: 0.2
VIT D+METAB SERPL-MCNC: 25.4 NG/ML (ref 30–100)
VLDLC SERPL CALC-MCNC: 16 MG/DL (ref 0–30)
WBC # BLD AUTO: 7.8 X10(3) UL (ref 4–11)

## 2023-01-28 PROCEDURE — 80053 COMPREHEN METABOLIC PANEL: CPT

## 2023-01-28 PROCEDURE — 81015 MICROSCOPIC EXAM OF URINE: CPT

## 2023-01-28 PROCEDURE — 87077 CULTURE AEROBIC IDENTIFY: CPT

## 2023-01-28 PROCEDURE — 81001 URINALYSIS AUTO W/SCOPE: CPT

## 2023-01-28 PROCEDURE — 83036 HEMOGLOBIN GLYCOSYLATED A1C: CPT

## 2023-01-28 PROCEDURE — 82306 VITAMIN D 25 HYDROXY: CPT

## 2023-01-28 PROCEDURE — 80061 LIPID PANEL: CPT

## 2023-01-28 PROCEDURE — 84550 ASSAY OF BLOOD/URIC ACID: CPT

## 2023-01-28 PROCEDURE — 36415 COLL VENOUS BLD VENIPUNCTURE: CPT

## 2023-01-28 PROCEDURE — 85025 COMPLETE CBC W/AUTO DIFF WBC: CPT

## 2023-01-28 PROCEDURE — 87086 URINE CULTURE/COLONY COUNT: CPT

## 2023-02-02 ENCOUNTER — OFFICE VISIT (OUTPATIENT)
Dept: PHYSICAL MEDICINE AND REHAB | Facility: CLINIC | Age: 78
End: 2023-02-02
Payer: MEDICARE

## 2023-02-02 VITALS — HEART RATE: 77 BPM | OXYGEN SATURATION: 96 % | DIASTOLIC BLOOD PRESSURE: 80 MMHG | SYSTOLIC BLOOD PRESSURE: 118 MMHG

## 2023-02-02 DIAGNOSIS — M25.562 CHRONIC PAIN OF LEFT KNEE: ICD-10-CM

## 2023-02-02 DIAGNOSIS — M54.16 LUMBAR RADICULOPATHY: ICD-10-CM

## 2023-02-02 DIAGNOSIS — M96.1 LUMBAR POST-LAMINECTOMY SYNDROME: ICD-10-CM

## 2023-02-02 DIAGNOSIS — G89.29 CHRONIC PAIN OF LEFT KNEE: ICD-10-CM

## 2023-02-02 DIAGNOSIS — G89.29 CHRONIC LEFT-SIDED LOW BACK PAIN WITH LEFT-SIDED SCIATICA: Primary | ICD-10-CM

## 2023-02-02 DIAGNOSIS — Z96.652 HISTORY OF LEFT KNEE REPLACEMENT: ICD-10-CM

## 2023-02-02 DIAGNOSIS — M54.42 CHRONIC LEFT-SIDED LOW BACK PAIN WITH LEFT-SIDED SCIATICA: Primary | ICD-10-CM

## 2023-02-02 PROCEDURE — 99204 OFFICE O/P NEW MOD 45 MIN: CPT | Performed by: PHYSICAL MEDICINE & REHABILITATION

## 2023-02-02 NOTE — PATIENT INSTRUCTIONS
Plan  She will get lumbar spine flexion and extension x-rays. She will get left knee x-rays. She will get a new a MRI of lumbar spine. She will follow up after having the above.

## 2023-02-10 ENCOUNTER — TELEPHONE (OUTPATIENT)
Dept: PHYSICAL MEDICINE AND REHAB | Facility: CLINIC | Age: 78
End: 2023-02-10

## 2023-02-20 ENCOUNTER — HOSPITAL ENCOUNTER (OUTPATIENT)
Dept: MRI IMAGING | Age: 78
Discharge: HOME | End: 2023-02-20
Attending: PHYSICAL MEDICINE & REHABILITATION
Payer: MEDICARE

## 2023-02-20 ENCOUNTER — HOSPITAL ENCOUNTER (OUTPATIENT)
Dept: GENERAL RADIOLOGY | Age: 78
Discharge: HOME | End: 2023-02-20
Attending: PHYSICAL MEDICINE & REHABILITATION
Payer: MEDICARE

## 2023-02-20 DIAGNOSIS — M54.42 CHRONIC LEFT-SIDED LOW BACK PAIN WITH LEFT-SIDED SCIATICA: ICD-10-CM

## 2023-02-20 DIAGNOSIS — M54.16 LUMBAR RADICULOPATHY: ICD-10-CM

## 2023-02-20 DIAGNOSIS — G89.29 CHRONIC LEFT-SIDED LOW BACK PAIN WITH LEFT-SIDED SCIATICA: ICD-10-CM

## 2023-02-20 DIAGNOSIS — G89.29 CHRONIC PAIN OF LEFT KNEE: ICD-10-CM

## 2023-02-20 DIAGNOSIS — M96.1 LUMBAR POST-LAMINECTOMY SYNDROME: ICD-10-CM

## 2023-02-20 DIAGNOSIS — M25.562 CHRONIC PAIN OF LEFT KNEE: ICD-10-CM

## 2023-02-20 PROCEDURE — 73562 X-RAY EXAM OF KNEE 3: CPT | Performed by: PHYSICAL MEDICINE & REHABILITATION

## 2023-02-20 PROCEDURE — 72158 MRI LUMBAR SPINE W/O & W/DYE: CPT | Performed by: PHYSICAL MEDICINE & REHABILITATION

## 2023-02-20 PROCEDURE — 72110 X-RAY EXAM L-2 SPINE 4/>VWS: CPT | Performed by: PHYSICAL MEDICINE & REHABILITATION

## 2023-02-20 PROCEDURE — A9575 INJ GADOTERATE MEGLUMI 0.1ML: HCPCS | Performed by: PHYSICAL MEDICINE & REHABILITATION

## 2023-02-20 RX ORDER — GADOTERATE MEGLUMINE 376.9 MG/ML
20 INJECTION INTRAVENOUS
Status: COMPLETED | OUTPATIENT
Start: 2023-02-20 | End: 2023-02-20

## 2023-02-20 RX ADMIN — GADOTERATE MEGLUMINE 18 ML: 376.9 INJECTION INTRAVENOUS at 14:03:00

## 2023-02-23 ENCOUNTER — TELEPHONE (OUTPATIENT)
Dept: PHYSICAL MEDICINE AND REHAB | Facility: CLINIC | Age: 78
End: 2023-02-23

## 2023-02-23 DIAGNOSIS — G89.29 CHRONIC PAIN OF LEFT KNEE: Primary | ICD-10-CM

## 2023-02-23 DIAGNOSIS — M25.562 CHRONIC PAIN OF LEFT KNEE: Primary | ICD-10-CM

## 2023-02-23 NOTE — TELEPHONE ENCOUNTER
Spoke with Sarah Lewis ([id number]) with xray who stated patient is there for a knee xray, but there is no order in the system. Reviewed with Sarah Lewis that there is an order from 2/20/23 that has left knee images, but no report. Informed her I would clarify with Rai Bliss and call her back. Per : \"he is to have a standing AP of the knee and the rest can be regular non-weight bearing\"     Spoke with Sraah Lewis and reviewed the above message. Per Sarah Lewis a new order will need to be placed. New order placed. Nothing further needed at this time.

## 2023-03-05 PROBLEM — M43.16 SPONDYLOLISTHESIS OF LUMBAR REGION: Status: ACTIVE | Noted: 2023-03-05

## 2023-03-06 PROBLEM — M51.26 LUMBAR HERNIATED DISC: Status: ACTIVE | Noted: 2023-03-06

## 2023-03-06 PROBLEM — M48.061 LUMBAR FORAMINAL STENOSIS: Status: ACTIVE | Noted: 2023-03-06

## 2023-03-06 PROBLEM — M51.9 LUMBAR DISC DISEASE: Status: ACTIVE | Noted: 2023-03-06

## 2023-03-09 ENCOUNTER — TELEPHONE (OUTPATIENT)
Dept: PHYSICAL MEDICINE AND REHAB | Facility: CLINIC | Age: 78
End: 2023-03-09

## 2023-03-09 NOTE — TELEPHONE ENCOUNTER
----- Message from Windell Kawasaki, MD sent at 3/6/2023 12:54 AM CST -----  Knee is intact and spine is stable. He has significant bulging discs with stenosis. Please see if he would like a sooner follow up to go over everything and discuss next steps.

## 2023-03-10 NOTE — TELEPHONE ENCOUNTER
SW patient's daughter and gave Dr. Rosa Salgado results notes as per below: \"Knee is intact and spine is stable. He has significant bulging discs with stenosis. Please see if he would like a sooner follow up to go over everything and discuss next steps. \"    Appointment changed to 3/23/23 at 5:00 pm to discuss next steps.

## 2023-03-23 ENCOUNTER — TELEPHONE (OUTPATIENT)
Dept: PHYSICAL MEDICINE AND REHAB | Facility: CLINIC | Age: 78
End: 2023-03-23

## 2023-03-23 ENCOUNTER — OFFICE VISIT (OUTPATIENT)
Dept: PHYSICAL MEDICINE AND REHAB | Facility: CLINIC | Age: 78
End: 2023-03-23
Payer: MEDICARE

## 2023-03-23 DIAGNOSIS — Z96.652 HISTORY OF LEFT KNEE REPLACEMENT: ICD-10-CM

## 2023-03-23 DIAGNOSIS — M51.26 LUMBAR HERNIATED DISC: ICD-10-CM

## 2023-03-23 DIAGNOSIS — M48.061 LUMBAR FORAMINAL STENOSIS: ICD-10-CM

## 2023-03-23 DIAGNOSIS — M25.562 CHRONIC PAIN OF LEFT KNEE: ICD-10-CM

## 2023-03-23 DIAGNOSIS — G89.29 CHRONIC LEFT-SIDED LOW BACK PAIN WITH LEFT-SIDED SCIATICA: ICD-10-CM

## 2023-03-23 DIAGNOSIS — M43.16 SPONDYLOLISTHESIS OF LUMBAR REGION: ICD-10-CM

## 2023-03-23 DIAGNOSIS — M96.1 LUMBAR POST-LAMINECTOMY SYNDROME: ICD-10-CM

## 2023-03-23 DIAGNOSIS — Z88.8 ALLERGY TO IODINE: Primary | ICD-10-CM

## 2023-03-23 DIAGNOSIS — M54.16 LUMBAR RADICULOPATHY: ICD-10-CM

## 2023-03-23 DIAGNOSIS — G89.29 CHRONIC PAIN OF LEFT KNEE: ICD-10-CM

## 2023-03-23 DIAGNOSIS — M54.42 CHRONIC LEFT-SIDED LOW BACK PAIN WITH LEFT-SIDED SCIATICA: ICD-10-CM

## 2023-03-23 DIAGNOSIS — M51.9 LUMBAR DISC DISEASE: Primary | ICD-10-CM

## 2023-03-23 PROCEDURE — 99214 OFFICE O/P EST MOD 30 MIN: CPT | Performed by: PHYSICAL MEDICINE & REHABILITATION

## 2023-03-23 NOTE — PATIENT INSTRUCTIONS
Plan  I will perform left L4 and L5 TFESI(s) under IV conscious sedation. She will get into the PT for the lumbar spine after she has had the above. She will trial CBD/THC gummies at night for the pain and the insomnia due to the pain. The patient will follow up in 2-3 months, but the patient will call me 2 weeks after having the injection to let me know how the injection worked.

## 2023-03-23 NOTE — TELEPHONE ENCOUNTER
Per Medicare Guidelines -no authorization is required for Left L4 and Left L5 TFESIs CPT 20749, 11876     Status: Authorization is not required however may be subject to review once claim is submitted-Covered Benefit    Fyi, per CMS LCD limitations-Use of Moderate or Deep Sedation, General Anesthesia, or Monitored Anesthesia Care (MAC) is usually unnecessary or rarely indicated for these procedures and therefore, is not considered medically reasonable and necessary. Even in patients with a needle phobia and anxiety, typically oral anxiolytics suffice. In exceptional and unique cases, documentation must clearly establish the need for such sedation in the specific patient.       Per Dr. Joey Greenberg w/ IVCS

## 2023-03-24 ENCOUNTER — TELEPHONE (OUTPATIENT)
Dept: PHYSICAL MEDICINE AND REHAB | Facility: CLINIC | Age: 78
End: 2023-03-24

## 2023-03-24 RX ORDER — PREDNISONE 50 MG/1
TABLET ORAL
Qty: 3 TABLET | Refills: 0 | Status: SHIPPED | OUTPATIENT
Start: 2023-03-24

## 2023-03-24 NOTE — TELEPHONE ENCOUNTER
Patient has been scheduled for Left L4 and L5 TFESI on 3/31/23 at the [address] [place] [address] with Ailin Yun. -Anesthesia type: IVCS. -If scheduling [facility] covid testing required for all procedures whether patient is vaccinated or not. -Patient informed not to eat or drink anything after midnight the night prior to the procedure, if being sedated. (Patient informed to fast 12 hours prior to procedure with IVCS/MAC. )  IVCS- yes  -Patient was advised that if he/she does receive the covid vaccine it needs to be at least 2 weeks before or after the injection. Yes  -Medications and allergies reviewed. Allergies to Iodine  (allergy prep phoned in )  -Patient reminded to hold NSAIDs (Ibuprofen, ASA 81, Aleve, Naproxen, Mobic, Diclofenac, Etodolac, Celebrex etc.) for 3 days prior to [place]  if BMI is greater than 35. For Cervical injections only hold multivitamins, Vitamin E, Fish Oil, Phentermine (Lomaira) for 7 days prior to injection and NSAIDS.  mg to be held for 7 days prior to injections. Lumbar  -If patient is receiving MAC/IVCS Phentermine [place]) will need to be held for 7 days prior to injection. N/A  -N/A If on blood thinner clearance has been received to hold this medication by provider.   -Patient informed he/she will need a  to and from procedure. yes  -[facility] is located in the [facility] 1st floor. Patient may park in the yellow or purple parking. Patient verbalized understanding and agrees with plan.  -----> Scheduled in Epic: Yes  -----> Scheduled in [employer]:  Yes

## 2023-03-24 NOTE — TELEPHONE ENCOUNTER
1)During scheduling, patient requested a prescription for a back brace. She is requesting one that does not have a wire in the back, but just one that would offer some support. RN routing this request to Dr. Deidre Cintron    2)  Does Dr. Deidre Cintron want patient to stop prophylactic ASA 81 mg daily prior to Lumbar TFESI?

## 2023-03-31 ENCOUNTER — OFFICE VISIT (OUTPATIENT)
Dept: SURGERY | Facility: CLINIC | Age: 78
End: 2023-03-31
Payer: MEDICARE

## 2023-03-31 DIAGNOSIS — M48.061 LUMBAR FORAMINAL STENOSIS: ICD-10-CM

## 2023-03-31 DIAGNOSIS — M54.16 LUMBAR RADICULOPATHY: Primary | ICD-10-CM

## 2023-03-31 DIAGNOSIS — M96.1 LUMBAR POST-LAMINECTOMY SYNDROME: ICD-10-CM

## 2023-03-31 DIAGNOSIS — M51.9 LUMBAR DISC DISEASE: ICD-10-CM

## 2023-03-31 PROCEDURE — 64484 NJX AA&/STRD TFRM EPI L/S EA: CPT | Performed by: PHYSICAL MEDICINE & REHABILITATION

## 2023-03-31 PROCEDURE — 64483 NJX AA&/STRD TFRM EPI L/S 1: CPT | Performed by: PHYSICAL MEDICINE & REHABILITATION

## 2023-03-31 NOTE — PROCEDURES
[facility]    2-LEVEL LUMBAR TRANSFORAMINAL   NAME:  Roni Caraballo    MR #:    [id number] :  7/15/1945     PHYSICIAN:  Wilbert Dalal. Fuentes Sutherland MD        Operative Report    DATE OF PROCEDURE: 3/31/2023   PREOPERATIVE DIAGNOSES: 1. left L4-5 and left > right L5-S1 radiculopathy    2. Lumbar post-laminectomy syndrome: L1-S1    3. L4-5 bilateral mod-severe, L3-4 left mod-severe/right severe, L2-3 left mod/right mod-severe foraminal stenosis    4. L1-2 mild diffuse & bilateral mod far lat, L2-3 right mod far lat & mild diffuse, L3-4 mod-large diffuse & left large far lat, L4-5 mild diffuse & left mod far lat, L5-S1 lt mild galilea bulging discs        POSTOPERATIVE DIAGNOSES:   1. left L4-5 and left > right L5-S1 radiculopathy    2. Lumbar post-laminectomy syndrome: L1-S1    3. L4-5 bilateral mod-severe, L3-4 left mod-severe/right severe, L2-3 left mod/right mod-severe foraminal stenosis    4. L1-2 mild diffuse & bilateral mod far lat, L2-3 right mod far lat & mild diffuse, L3-4 mod-large diffuse & left large far lat, L4-5 mild diffuse & left mod far lat, L5-S1 lt mild galilea bulging discs        PROCEDURES: Left L4 and L5 transforaminal epidural steroid injections done under fluoroscopic guidance with contrast enhancement. SURGEON: Mar Chong MD   ANESTHESIA: IV conscious sedation   INDICATIONS:      OPERATIVE PROCEDURE:  Written consent was obtained from the patient. The patient was brought into the operating room and placed in the prone position on the fluoroscopy table with pillow underneath the abdomen. The patient's skin was cleaned and draped in a normal sterile fashion. Using AP fluoroscopy, all five lumbar vertebrae were identified. When the fourth and fifth vertebrae were identified, fluoroscopy was right anterior obliqued opening up the left L4-5 and left L5-S1 intervertebral foramen. At this point in time, each site was anesthetized with 1% PF lidocaine without epinephrine. Then, 5 inch, 22-gauge spinal needles were inserted and directed towards the left L4-5 and left L5-S1 intervertebral foramen. When they felt to be in good position, AP fluoroscopy was used to advance the needles to the 6 o'clock position on the left L4 and left L5 pedicles. At this point in time, Omnipaque-240 contrast was used to obtain a good epidurogram indicating correct needle placement at each level. Then, aspiration was performed. No blood, fluid, or air was aspirated. Then, the patient was injected with a 3 cc solution of 1.5 cc of 40 mg/cc of Kenalog and 1.5 cc of 1% PF lidocaine without epinephrine at each site. After this, the needles were removed. Each site was cleaned. Band-Aids were applied. The patient was transferred to the cart and into [facility]. The patient was given discharge instructions and will follow up in the clinic as scheduled. Throughout the whole procedure, the patient's pulse oximetry and vital signs were monitored and they remained completely stable. Also, throughout the whole procedure, prior to injection of any medication, aspiration was performed. No blood, fluid, or air was aspirated at anytime. The total time for conscious sedation was 9 minutes and the total medication used was 75 micrograms of IV Fentanyl and 3 mg of IV Versaid.

## 2023-04-19 ENCOUNTER — TELEPHONE (OUTPATIENT)
Dept: PHYSICAL MEDICINE AND REHAB | Facility: CLINIC | Age: 78
End: 2023-04-19

## 2023-04-19 DIAGNOSIS — M54.16 LUMBAR RADICULOPATHY: Primary | ICD-10-CM

## 2023-04-22 NOTE — TELEPHONE ENCOUNTER
If she does not have any swelling of rhe left leg, then I will do an EMG/NCS of the left leg. She can purchase a soft L-S corset if she would like.

## 2023-04-25 NOTE — TELEPHONE ENCOUNTER
SW patient and Left leg is sometimes swollen above the knee to below the knee.       Patient is open to EMG if Dr. James Connor

## 2023-04-25 NOTE — TELEPHONE ENCOUNTER
MATT patient and she is leery about EMG but is willing to try. Patient was scheduled for 1 limb LLE EMG on 5/2/2023 at 12:00 pm w/ Dr. Janet Fierro. RN advised patient that Sylvie Bumpers stated she can have a soft L-S corset and can purchase on own, no referral needed on that. Patient stated she has been to several specialists and they all told her that nothing could be done about her leg pain, but she is willing to try.

## 2023-05-02 ENCOUNTER — PROCEDURE VISIT (OUTPATIENT)
Dept: PHYSICAL MEDICINE AND REHAB | Facility: CLINIC | Age: 78
End: 2023-05-02
Payer: MEDICARE

## 2023-05-02 DIAGNOSIS — M54.16 LUMBAR RADICULOPATHY: Primary | ICD-10-CM

## 2023-05-02 DIAGNOSIS — G60.8 SENSORY PERIPHERAL NEUROPATHY: ICD-10-CM

## 2023-05-02 PROCEDURE — 95886 MUSC TEST DONE W/N TEST COMP: CPT | Performed by: PHYSICAL MEDICINE & REHABILITATION

## 2023-05-02 PROCEDURE — 95910 NRV CNDJ TEST 7-8 STUDIES: CPT | Performed by: PHYSICAL MEDICINE & REHABILITATION

## 2023-05-09 PROBLEM — G60.8 SENSORY PERIPHERAL NEUROPATHY: Status: ACTIVE | Noted: 2023-05-09

## 2023-05-11 ENCOUNTER — TELEPHONE (OUTPATIENT)
Dept: PHYSICAL MEDICINE AND REHAB | Facility: CLINIC | Age: 78
End: 2023-05-11

## 2023-05-11 DIAGNOSIS — G60.8 SENSORY PERIPHERAL NEUROPATHY: Primary | ICD-10-CM

## 2023-05-11 NOTE — TELEPHONE ENCOUNTER
SW patient's daughter to advise that Dr. 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 Patient's daughter stated she does not think patient has diabetes or hypothyroidism. Daughter stated that patient has had to take blood sugars at home for last six months or so, to monitor (pre-diabetes) but is not diabetic. RN advised he would let Dr. Sandra Kebede know, and he may need to order labs or additional testing to determine best course of treatment. Advised we will contact them with his next steps recommendations.

## 2023-05-16 NOTE — TELEPHONE ENCOUNTER
M for patient to advise that lab orders have been entered for her by Dr. Shruti Hernandez, and that she can go to any [facility] location to get them done.

## 2023-05-20 ENCOUNTER — LAB ENCOUNTER (OUTPATIENT)
Dept: LAB | Age: 78
End: 2023-05-20
Attending: PHYSICAL MEDICINE & REHABILITATION
Payer: MEDICARE

## 2023-05-20 DIAGNOSIS — G60.8 SENSORY PERIPHERAL NEUROPATHY: ICD-10-CM

## 2023-05-20 LAB
ALBUMIN SERPL-MCNC: 3.5 G/DL (ref 3.4–5)
ALBUMIN/GLOB SERPL: 1 {RATIO} (ref 1–2)
ALP LIVER SERPL-CCNC: 62 U/L
ALT SERPL-CCNC: 31 U/L
ANION GAP SERPL CALC-SCNC: 2 MMOL/L (ref 0–18)
AST SERPL-CCNC: 21 U/L (ref 15–37)
BASOPHILS # BLD AUTO: 0.04 X10(3) UL (ref 0–0.2)
BASOPHILS NFR BLD AUTO: 0.5 %
BILIRUB SERPL-MCNC: 0.3 MG/DL (ref 0.1–2)
BUN BLD-MCNC: 10 MG/DL (ref 7–18)
BUN/CREAT SERPL: 17.5 (ref 10–20)
CALCIUM BLD-MCNC: 9.2 MG/DL (ref 8.5–10.1)
CHLORIDE SERPL-SCNC: 108 MMOL/L (ref 98–112)
CO2 SERPL-SCNC: 30 MMOL/L (ref 21–32)
CREAT BLD-MCNC: 0.57 MG/DL
DEPRECATED RDW RBC AUTO: 47.6 FL (ref 35.1–46.3)
EOSINOPHIL # BLD AUTO: 0.11 X10(3) UL (ref 0–0.7)
EOSINOPHIL NFR BLD AUTO: 1.5 %
ERYTHROCYTE [DISTWIDTH] IN BLOOD BY AUTOMATED COUNT: 13.9 % (ref 11–15)
ERYTHROCYTE [SEDIMENTATION RATE] IN BLOOD: 25 MM/HR
FASTING STATUS PATIENT QL REPORTED: YES
FOLATE SERPL-MCNC: 18.1 NG/ML (ref 8.7–?)
GFR SERPLBLD BASED ON 1.73 SQ M-ARVRAT: 92 ML/MIN/1.73M2 (ref 60–?)
GLOBULIN PLAS-MCNC: 3.4 G/DL (ref 2.8–4.4)
GLUCOSE BLD-MCNC: 99 MG/DL (ref 70–99)
HCT VFR BLD AUTO: 44 %
HGB BLD-MCNC: 14.2 G/DL
IMM GRANULOCYTES # BLD AUTO: 0.05 X10(3) UL (ref 0–1)
IMM GRANULOCYTES NFR BLD: 0.7 %
LYMPHOCYTES # BLD AUTO: 2.46 X10(3) UL (ref 1–4)
LYMPHOCYTES NFR BLD AUTO: 32.6 %
MCH RBC QN AUTO: 29.8 PG (ref 26–34)
MCHC RBC AUTO-ENTMCNC: 32.3 G/DL (ref 31–37)
MCV RBC AUTO: 92.4 FL
MONOCYTES # BLD AUTO: 0.7 X10(3) UL (ref 0.1–1)
MONOCYTES NFR BLD AUTO: 9.3 %
NEUTROPHILS # BLD AUTO: 4.19 X10 (3) UL (ref 1.5–7.7)
NEUTROPHILS # BLD AUTO: 4.19 X10(3) UL (ref 1.5–7.7)
NEUTROPHILS NFR BLD AUTO: 55.4 %
OSMOLALITY SERPL CALC.SUM OF ELEC: 289 MOSM/KG (ref 275–295)
PLATELET # BLD AUTO: 270 10(3)UL (ref 150–450)
POTASSIUM SERPL-SCNC: 4 MMOL/L (ref 3.5–5.1)
PROT SERPL-MCNC: 6.9 G/DL (ref 6.4–8.2)
RBC # BLD AUTO: 4.76 X10(6)UL
SODIUM SERPL-SCNC: 140 MMOL/L (ref 136–145)
VIT B12 SERPL-MCNC: 210 PG/ML (ref 193–986)
WBC # BLD AUTO: 7.6 X10(3) UL (ref 4–11)

## 2023-05-20 PROCEDURE — 80053 COMPREHEN METABOLIC PANEL: CPT

## 2023-05-20 PROCEDURE — 82607 VITAMIN B-12: CPT

## 2023-05-20 PROCEDURE — 85025 COMPLETE CBC W/AUTO DIFF WBC: CPT

## 2023-05-20 PROCEDURE — 85652 RBC SED RATE AUTOMATED: CPT

## 2023-05-20 PROCEDURE — 36415 COLL VENOUS BLD VENIPUNCTURE: CPT

## 2023-05-20 PROCEDURE — 82746 ASSAY OF FOLIC ACID SERUM: CPT

## 2023-06-02 ENCOUNTER — TELEPHONE (OUTPATIENT)
Dept: PHYSICAL MEDICINE AND REHAB | Facility: CLINIC | Age: 78
End: 2023-06-02

## 2023-06-02 NOTE — TELEPHONE ENCOUNTER
----- Message from Christoph Hernandez MD sent at 5/20/2023  6:42 PM CDT -----  The labs are essentially normal.  She could see one of the neurologists to help explain her neuropathy.

## 2023-06-02 NOTE — TELEPHONE ENCOUNTER
S/W patient and she is open to seeing neurologist.  She would like to get through her eye surgery first, but will make the appointment in near future.

## 2023-06-22 ENCOUNTER — OFFICE VISIT (OUTPATIENT)
Dept: PHYSICAL MEDICINE AND REHAB | Facility: CLINIC | Age: 78
End: 2023-06-22
Payer: MEDICARE

## 2023-06-22 ENCOUNTER — TELEPHONE (OUTPATIENT)
Dept: PHYSICAL MEDICINE AND REHAB | Facility: CLINIC | Age: 78
End: 2023-06-22

## 2023-06-22 VITALS — OXYGEN SATURATION: 98 % | SYSTOLIC BLOOD PRESSURE: 118 MMHG | DIASTOLIC BLOOD PRESSURE: 68 MMHG | HEART RATE: 78 BPM

## 2023-06-22 DIAGNOSIS — M25.562 CHRONIC PAIN OF LEFT KNEE: ICD-10-CM

## 2023-06-22 DIAGNOSIS — M96.1 LUMBAR POST-LAMINECTOMY SYNDROME: ICD-10-CM

## 2023-06-22 DIAGNOSIS — G89.29 CHRONIC PAIN OF LEFT KNEE: ICD-10-CM

## 2023-06-22 DIAGNOSIS — G60.8 SENSORY PERIPHERAL NEUROPATHY: ICD-10-CM

## 2023-06-22 DIAGNOSIS — M51.9 LUMBAR DISC DISEASE: Primary | ICD-10-CM

## 2023-06-22 DIAGNOSIS — M43.16 SPONDYLOLISTHESIS OF LUMBAR REGION: ICD-10-CM

## 2023-06-22 DIAGNOSIS — M48.061 LUMBAR FORAMINAL STENOSIS: ICD-10-CM

## 2023-06-22 DIAGNOSIS — M54.16 LUMBAR RADICULOPATHY: ICD-10-CM

## 2023-06-22 DIAGNOSIS — Z88.8 ALLERGY TO IODINE: Primary | ICD-10-CM

## 2023-06-22 DIAGNOSIS — M51.26 LUMBAR HERNIATED DISC: ICD-10-CM

## 2023-06-22 PROCEDURE — 99214 OFFICE O/P EST MOD 30 MIN: CPT | Performed by: PHYSICAL MEDICINE & REHABILITATION

## 2023-06-22 NOTE — TELEPHONE ENCOUNTER
Per CMS Guidelines -no authorization is required for  Left knee geniculate nerve blocks CPT 08507    Status: Authorization is not required however may be subject to review once claim is submitted-Covered Benefit     Per Dr. Oanh Box w/ IVCS

## 2023-06-22 NOTE — PATIENT INSTRUCTIONS
Plan  I will perform left knee geniculate nerve blocks and if she has as positive response, then I will do the radiofrequency neurotomies under MAC. I will hold on repeat left L4 and L5 TFESI's for now. If she does well with the above, then she will need to have PT for the left knee and the lumbar spine. The patient will continue with their current pain medications. She will follow up in 2-3 months or sooner if needed, but she will let me know the day after the blocks how she dd the day of the injections.

## 2023-06-26 RX ORDER — PREDNISONE 50 MG/1
TABLET ORAL
Qty: 3 TABLET | Refills: 0 | Status: SHIPPED | OUTPATIENT
Start: 2023-06-26

## 2023-06-26 NOTE — TELEPHONE ENCOUNTER
Patient has been scheduled for  Left knee geniculate nerve blocks CPT 52433  on 6/30/2023 at the [facility] with Amanda Jamesifton. -Anesthesia type: IVSC. -If scheduling [address] covid testing required for all procedures whether patient is vaccinated or not. -Patient informed not to eat or drink anything after midnight the night prior to the procedure, if being sedated. (For afternoon injections: Patient to fast 6-8 hours prior to procedure with IVCS/MAC. )    Nothing after midnight  -Patient was advised that if he/she does receive the covid vaccine it needs to be at least 2 weeks before or after the injection. -Medications and allergies reviewed. -Patient reminded to hold NSAIDs (Ibuprofen, ASA 81, Aleve, Naproxen, Mobic, Diclofenac, Etodolac, Celebrex etc.) for 3 days prior to [facility]  if BMI is greater than 35. For Cervical injections only hold multivitamins, Vitamin E, Fish Oil, Phentermine (Lomaira) for 7 days prior to injection and NSAIDS.  mg to be held for 7 days prior to injections.  -If patient is receiving MAC/IVCS Phentermine Alcario Comas) will need to be held for 7 days prior to injection.  -If on blood thinner clearance has been received to hold this medication by provider.   -Patient informed he/she will need a  to and from procedure. Yes ()  -[facility] is located in the [facility] 1st floor. Patient may park in the yellow or purple parking. Follow up appointment has been scheduled for patient on: Call or MyChart our office 2 weeks after the injection to advise how feeling. Patient verbalized understanding and agrees with plan.  -----> Scheduled in Epic: Yes  -----> Scheduled in [employer]:  Yes

## 2023-06-30 ENCOUNTER — OFFICE VISIT (OUTPATIENT)
Dept: SURGERY | Facility: CLINIC | Age: 78
End: 2023-06-30
Payer: MEDICARE

## 2023-06-30 DIAGNOSIS — G89.29 CHRONIC PAIN OF LEFT KNEE: Primary | ICD-10-CM

## 2023-06-30 DIAGNOSIS — M25.562 CHRONIC PAIN OF LEFT KNEE: Primary | ICD-10-CM

## 2023-06-30 PROCEDURE — 64454 NJX AA&/STRD GNCLR NRV BRNCH: CPT | Performed by: PHYSICAL MEDICINE & REHABILITATION

## 2023-07-03 ENCOUNTER — TELEPHONE (OUTPATIENT)
Dept: PHYSICAL MEDICINE AND REHAB | Facility: CLINIC | Age: 78
End: 2023-07-03

## 2023-07-03 DIAGNOSIS — G89.29 CHRONIC PAIN OF LEFT KNEE: Primary | ICD-10-CM

## 2023-07-03 DIAGNOSIS — M25.562 CHRONIC PAIN OF LEFT KNEE: Primary | ICD-10-CM

## 2023-07-05 ENCOUNTER — TELEPHONE (OUTPATIENT)
Dept: PHYSICAL MEDICINE AND REHAB | Facility: CLINIC | Age: 78
End: 2023-07-05

## 2023-07-05 DIAGNOSIS — M25.562 CHRONIC PAIN OF LEFT KNEE: Primary | ICD-10-CM

## 2023-07-05 DIAGNOSIS — G89.29 CHRONIC PAIN OF LEFT KNEE: Primary | ICD-10-CM

## 2023-07-05 DIAGNOSIS — Z88.8 ALLERGY TO IODINE: ICD-10-CM

## 2023-07-05 DIAGNOSIS — G89.29 OTHER CHRONIC PAIN: ICD-10-CM

## 2023-07-05 NOTE — TELEPHONE ENCOUNTER
Per CMS Guidelines -no authorization is required for Left geniculate nerve radiofrequency neurotomies CPT 06202    Status: Authorization is not required however may be subject to review once claim is submitted-Covered Benefit     Per Dr. Jaison Quigley w/ [facility]

## 2023-07-06 RX ORDER — PREDNISONE 50 MG/1
TABLET ORAL
Qty: 3 TABLET | Refills: 0 | Status: SHIPPED | OUTPATIENT
Start: 2023-07-06

## 2023-07-06 NOTE — TELEPHONE ENCOUNTER
1 case at 1:00 pm per Cary Clamp @ [address] [place] [address]    Patient has been scheduled for Left geniculate nerve radiofrequency neurotomies  on 07/21/2023 at the [address] [place] [address] with Meek Cox. -Anesthesia type: MAC.  -If scheduling [address] [place] [address] covid testing required for all procedures whether patient is vaccinated or not. -Patient informed not to eat or drink anything after midnight the night prior to the procedure, if being sedated. (For afternoon injections: Patient to fast 6-8 hours prior to procedure with IVCS/MAC. )  -Patient was advised that if he/she does receive the covid vaccine it needs to be at least 2 weeks before or after the injection. -Medications and allergies reviewed. Allergy prep ordered.  -Patient reminded to hold NSAIDs (Ibuprofen, ASA 81, Aleve, Naproxen, Mobic, Diclofenac, Etodolac, Celebrex etc.) for 3 days prior to [place]  if BMI is greater than 35. For Cervical injections only hold multivitamins, Vitamin E, Fish Oil, Phentermine (Lomaira) for 7 days prior to injection and NSAIDS.  mg to be held for 7 days prior to injections.  -If patient is receiving MAC/IVCS Phentermine Sloan Kayla) will need to be held for 7 days prior to injection.  -If on blood thinner clearance has been received to hold this medication by provider.   -Patient informed he/she will need a  to and from procedure.  (Spouse will accompany patient)  -Patient informed [address] [place] [address] park green/blue parking lot, enter main entrance, take main elevator to second floor, and chek in at registration desk, by 12 pm.   Patient verbalized understanding and agrees with plan.  -----> Scheduled in Epic: Yes  -----> Scheduled as surgical case request: Yes

## 2023-07-13 RX ORDER — GINGER ROOT/GINGER ROOT EXT 262.5 MG
CAPSULE ORAL 2 TIMES DAILY
COMMUNITY

## 2023-07-13 RX ORDER — ATORVASTATIN CALCIUM 40 MG/1
40 TABLET, FILM COATED ORAL NIGHTLY
COMMUNITY

## 2023-07-13 RX ORDER — DULOXETIN HYDROCHLORIDE 30 MG/1
30 CAPSULE, DELAYED RELEASE ORAL EVERY MORNING
COMMUNITY

## 2023-07-21 ENCOUNTER — ANESTHESIA (OUTPATIENT)
Dept: SURGERY | Facility: HOSPITAL | Age: 78
End: 2023-07-21
Payer: MEDICARE

## 2023-07-21 ENCOUNTER — APPOINTMENT (OUTPATIENT)
Dept: GENERAL RADIOLOGY | Facility: HOSPITAL | Age: 78
End: 2023-07-21
Attending: PHYSICAL MEDICINE & REHABILITATION
Payer: MEDICARE

## 2023-07-21 ENCOUNTER — HOSPITAL ENCOUNTER (OUTPATIENT)
Facility: HOSPITAL | Age: 78
Setting detail: HOSPITAL OUTPATIENT SURGERY
Discharge: HOME | End: 2023-07-21
Attending: PHYSICAL MEDICINE & REHABILITATION | Admitting: PHYSICAL MEDICINE & REHABILITATION
Payer: MEDICARE

## 2023-07-21 ENCOUNTER — ANESTHESIA EVENT (OUTPATIENT)
Dept: SURGERY | Facility: HOSPITAL | Age: 78
End: 2023-07-21
Payer: MEDICARE

## 2023-07-21 VITALS
HEIGHT: 60 IN | SYSTOLIC BLOOD PRESSURE: 132 MMHG | HEART RATE: 74 BPM | BODY MASS INDEX: 37.89 KG/M2 | OXYGEN SATURATION: 96 % | RESPIRATION RATE: 16 BRPM | WEIGHT: 193 LBS | TEMPERATURE: 97 F | DIASTOLIC BLOOD PRESSURE: 58 MMHG

## 2023-07-21 PROCEDURE — 3E0T3TZ INTRODUCTION OF DESTRUCTIVE AGENT INTO PERIPHERAL NERVES AND PLEXI, PERCUTANEOUS APPROACH: ICD-10-PCS | Performed by: PHYSICAL MEDICINE & REHABILITATION

## 2023-07-21 RX ORDER — FAMOTIDINE 20 MG/1
20 TABLET, FILM COATED ORAL ONCE
Status: COMPLETED | OUTPATIENT
Start: 2023-07-21 | End: 2023-07-21

## 2023-07-21 RX ORDER — LIDOCAINE HYDROCHLORIDE 20 MG/ML
INJECTION, SOLUTION EPIDURAL; INFILTRATION; INTRACAUDAL; PERINEURAL AS NEEDED
Status: DISCONTINUED | OUTPATIENT
Start: 2023-07-21 | End: 2023-07-21 | Stop reason: HOSPADM

## 2023-07-21 RX ORDER — METOCLOPRAMIDE 10 MG/1
10 TABLET ORAL ONCE
Status: DISCONTINUED | OUTPATIENT
Start: 2023-07-21 | End: 2023-07-21 | Stop reason: HOSPADM

## 2023-07-21 RX ORDER — LIDOCAINE HYDROCHLORIDE 10 MG/ML
INJECTION, SOLUTION EPIDURAL; INFILTRATION; INTRACAUDAL; PERINEURAL AS NEEDED
Status: DISCONTINUED | OUTPATIENT
Start: 2023-07-21 | End: 2023-07-21 | Stop reason: SURG

## 2023-07-21 RX ORDER — ACETAMINOPHEN 500 MG
1000 TABLET ORAL ONCE
Status: COMPLETED | OUTPATIENT
Start: 2023-07-21 | End: 2023-07-21

## 2023-07-21 RX ORDER — PREDNISONE 50 MG/1
50 TABLET ORAL DAILY
COMMUNITY

## 2023-07-21 RX ORDER — LIDOCAINE HYDROCHLORIDE 10 MG/ML
INJECTION, SOLUTION EPIDURAL; INFILTRATION; INTRACAUDAL; PERINEURAL AS NEEDED
Status: DISCONTINUED | OUTPATIENT
Start: 2023-07-21 | End: 2023-07-21 | Stop reason: HOSPADM

## 2023-07-21 RX ORDER — SODIUM CHLORIDE, SODIUM LACTATE, POTASSIUM CHLORIDE, CALCIUM CHLORIDE 600; 310; 30; 20 MG/100ML; MG/100ML; MG/100ML; MG/100ML
INJECTION, SOLUTION INTRAVENOUS CONTINUOUS
Status: DISCONTINUED | OUTPATIENT
Start: 2023-07-21 | End: 2023-07-21

## 2023-07-21 RX ADMIN — LIDOCAINE HYDROCHLORIDE 30 MG: 10 INJECTION, SOLUTION EPIDURAL; INFILTRATION; INTRACAUDAL; PERINEURAL at 14:49:00

## 2023-07-21 RX ADMIN — SODIUM CHLORIDE, SODIUM LACTATE, POTASSIUM CHLORIDE, CALCIUM CHLORIDE: 600; 310; 30; 20 INJECTION, SOLUTION INTRAVENOUS at 15:22:00

## 2023-07-21 NOTE — OPERATIVE REPORT
[facility] Outpatient Surgery    GENICULATE NERVE RADIOFREQUENCY LESIONING  NAME:  Jagdeep Leiva    MR #:    [id number] :  7/15/1945     PHYSICIAN:  Le Redmond MD        Operative Report    DATE OF PROCEDURE: 2023   PREOPERATIVE DIAGNOSES: Problem   Chronic pain of left knee with intact prosthesis        POSTOPERATIVE DIAGNOSES:   same    PROCEDURES: left superior lateral and medial, and inferior medial geniculate nerves which are branches of the sciatic nerve, the femoral nerve, and saphenous nerve respectively Radiofrequency Neurotomies done under fluoroscopic guidance with contrast enhancement. SURGEON: Le Chong MD   ANESTHESIA: MAC   INDICATIONS:      OPERATIVE PROCEDURE:  Written consent was obtained from the patient. The patient was brought into the operating room and placed in the supine position on the fluoroscopy table . The patient's skin was cleaned and draped in a normal sterile fashion. Using AP fluoroscopy, the left knee was identified. Phyllistine Solders were placed on the patient's skin overlying the superior aspects of the medial and lateral femoral epicondyles, and the distal aspect of the medial condyle corresponding to the superior lateral and medial, and inferior medial geniculate nerves which are branches of the sciatic nerve, the femoral nerve, and saphenous nerve respectively. At this point in time, the patient's skin was anesthetized with 1% PF lidocaine without epinephrine overlying each needle site. Then, 17 gauge 4 mm active tip radiofrequency needles were inserted and directed to the above stated sites. When they felt to be in good position, sensory stimulation was performed to identify correct needle placement. Then each site was anesthetized  with 1 ml of 1 or 2% PF Lidocaine without epinephrine. Then radiofrequency lesioning was performed heating the needle tips to 80 degrees Celsius for 150 seconds. After this, the needles were removed.    The patient's skin was cleaned. Band-Aids were applied. The patient was transferred to the cart and into [facility]. The patient was given discharge instructions and will follow up in the clinic as scheduled. Throughout the whole procedure, the patient's pulse oximetry and vital signs were monitored and they remained completely stable. Also, throughout the whole procedure, prior to injection of any medication, aspiration was performed. No blood, fluid, or air was aspirated at anytime.

## 2023-07-21 NOTE — DISCHARGE SUMMARY
[facility] - [facility]    Discharge Summary    Fidelia Villar Patient Status:  Hospital Outpatient Surgery    7/15/1945 MRN [id number]   Location [address] [facility] Attending Cher Oden MD   Hosp Day # 0 PCP Lobo Fernández MD     Date of Admission: 2023 Disposition: Home or Self Care     Date of Discharge: 2023    Admitting Diagnosis: Chronic pain of left knee [M25.562, G89.29]  Other chronic pain [G89.29]    Discharge Diagnosis: Patient Active Problem List:     Osteoporosis, unspecified     Osteoarthrosis, unspecified whether generalized or localized, lower leg     Carpal tunnel syndrome     Other and unspecified hyperlipidemia     Hypokalemia     Pneumonia due to COVID-19 virus     Hypoxia     Chronic pain of left knee with intact prosthesis     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     Chronic left-sided low back pain with left-sided sciatica     Lumbar post-laminectomy syndrome: L1-S1     left L4-5-S1 chronic and right S1 chronic radiculopathy     L5-S1 grade 1 unstable, L4-5 grade 1 stable spondylolisthesis     L4-5 bilateral mod-severe, L3-4 left mod-severe/right severe, L2-3 left mod/right mod-severe foraminal stenosis     L1-2 mild diffuse & bilateral mod far lat, L2-3 right mod far lat & mild diffuse, L3-4 mod-large diffuse & left large far lat, L4-5 mild diffuse & left mod far lat, L5-S1 lt mild galilea bulging discs     L4-5 mild central herniated disc     Sensory peripheral neuropathy: axonal      Procedures: left knee geniculate RFN    Complications: none    Discharge Condition: Stable    Discharge Medications:      Discharge Medications        ASK your doctor about these medications        Instructions Prescription details   acetaminophen 325 MG Tabs  Commonly known as: Tylenol      Take 500 mg by mouth 3 (three) times daily.    Refills: 0     acetaminophen-codeine 300-30 MG Tabs  Commonly known as: Tylenol #3      Take 1 tablet by mouth daily as needed. Refills: 0     alendronate 35 MG Tabs  Commonly known as: Fosamax      Take 1 tablet (35 mg total) by mouth once a week. 30 MINUTES BEFORE THE FIRST FOOD BEVERAGE OR MEDICINE OF THE DAY WITH PLAIN WATER   Refills: 0     amLODIPine 2.5 MG Tabs  Commonly known as: Norvasc      Take 1 tablet (2.5 mg total) by mouth every morning. Refills: 0     aspirin 81 MG Chew      Chew 4 tablets (324 mg total) by mouth daily. Refills: 0     atorvastatin 40 MG Tabs  Commonly known as: Lipitor      Take 1 tablet (40 mg total) by mouth nightly. Refills: 0     Calcium 600/Vitamin D3 600-20 MG-MCG Tabs  Generic drug: Calcium Carb-Cholecalciferol      Take by mouth 2 (two) times a day. Refills: 0     DULoxetine 30 MG Cpep  Commonly known as: Cymbalta      Take 1 capsule (30 mg total) by mouth every morning. Refills: 0     DULoxetine 60 MG Cpep  Commonly known as: Cymbalta      Take 1 capsule (60 mg total) by mouth every evening. Refills: 0     gabapentin 400 MG Caps  Commonly known as: Neurontin      Take 2 capsules (800 mg total) by mouth 2 (two) times daily. Refills: 0     latanoprost 0.005 % Soln  Commonly known as: Xalatan      Place 1 drop into both eyes daily. Refills: 0     methocarbamol 750 MG Tabs  Commonly known as: Robaxin      Take 1 tablet (750 mg total) by mouth 3 (three) times daily. Refills: 0     predniSONE 50 MG Tabs  Commonly known as: Deltasone      Take 1 tablet (50 mg total) by mouth daily. Patient was instructed to take 3 pills prior to procedure today   Refills: 0              Follow up Visits: Follow-up with Dr. Wilman Villegas in 10 days with a phone call and in clinic as scheduled. Other Discharge Instructions: ice 20 minutes on and 20 minutes off for 2 hours and then as needed for the pain. Remove dressing in 24 hours. Soto Villegas MD  7/21/2023  2:29 PM

## 2023-07-26 ENCOUNTER — TELEPHONE (OUTPATIENT)
Dept: PHYSICAL MEDICINE AND REHAB | Facility: CLINIC | Age: 78
End: 2023-07-26

## 2023-07-28 ENCOUNTER — TELEPHONE (OUTPATIENT)
Dept: PHYSICAL MEDICINE AND REHAB | Facility: CLINIC | Age: 78
End: 2023-07-28

## 2023-07-31 ENCOUNTER — TELEPHONE (OUTPATIENT)
Dept: PHYSICAL MEDICINE AND REHAB | Facility: CLINIC | Age: 78
End: 2023-07-31

## 2023-07-31 NOTE — TELEPHONE ENCOUNTER
S/W patient to schedule appointment for follow up with Dr. Staci Busch. Patient wished for slightly later appointment, so patient was scheduled on 8/30/2023 at 9:30 am.    Patient was reminded of an appt on 9/26/2023 at 1:30 pm with Dr. Staci Busch and 9/27/2023 at 11:30 am with Dr. Zofia Mcgowan. Patient states she is in some pain, and has been difficult to sleep, but she is taking the Tylenol #3 for pain to get by when this happens. This RN asked patient if she would like to see if Dr. Staci Busch recommends anything further, and patient stated that she can tolerate this pain for a little while longer until it heals, as she has been in pain for so long, a few weeks of additional pain isn't going to matter for her, especially if it results in improvement later. This RN advised if pain gets bad though, to please let us know, as we can ask Dr. Staci Busch if anything else can be given, as we do not want her to suffer through pain if it is bad. Patient verbalized understanding. Nothing further required at this time.

## 2023-08-30 ENCOUNTER — OFFICE VISIT (OUTPATIENT)
Dept: PHYSICAL MEDICINE AND REHAB | Facility: CLINIC | Age: 78
End: 2023-08-30
Payer: MEDICARE

## 2023-08-30 ENCOUNTER — TELEPHONE (OUTPATIENT)
Dept: PHYSICAL MEDICINE AND REHAB | Facility: CLINIC | Age: 78
End: 2023-08-30

## 2023-08-30 VITALS — HEIGHT: 60 IN | BODY MASS INDEX: 38 KG/M2

## 2023-08-30 DIAGNOSIS — G89.29 CHRONIC PAIN OF LEFT KNEE: ICD-10-CM

## 2023-08-30 DIAGNOSIS — M51.9 LUMBAR DISC DISEASE: ICD-10-CM

## 2023-08-30 DIAGNOSIS — Z96.652 HISTORY OF LEFT KNEE REPLACEMENT: ICD-10-CM

## 2023-08-30 DIAGNOSIS — M51.26 LUMBAR HERNIATED DISC: ICD-10-CM

## 2023-08-30 DIAGNOSIS — M48.061 LUMBAR FORAMINAL STENOSIS: ICD-10-CM

## 2023-08-30 DIAGNOSIS — M96.1 LUMBAR POST-LAMINECTOMY SYNDROME: ICD-10-CM

## 2023-08-30 DIAGNOSIS — M43.16 SPONDYLOLISTHESIS OF LUMBAR REGION: ICD-10-CM

## 2023-08-30 DIAGNOSIS — M54.16 LUMBAR RADICULOPATHY: Primary | ICD-10-CM

## 2023-08-30 DIAGNOSIS — M25.562 CHRONIC PAIN OF LEFT KNEE: ICD-10-CM

## 2023-08-30 DIAGNOSIS — G60.8 SENSORY PERIPHERAL NEUROPATHY: ICD-10-CM

## 2023-08-30 PROCEDURE — 99214 OFFICE O/P EST MOD 30 MIN: CPT | Performed by: PHYSICAL MEDICINE & REHABILITATION

## 2023-08-30 RX ORDER — TEMAZEPAM 7.5 MG/1
7.5 CAPSULE ORAL NIGHTLY PRN
Qty: 30 CAPSULE | Refills: 3 | Status: SHIPPED | OUTPATIENT
Start: 2023-08-30

## 2023-08-30 RX ORDER — ACETAMINOPHEN AND CODEINE PHOSPHATE 300; 30 MG/1; MG/1
1 TABLET ORAL EVERY 6 HOURS PRN
Qty: 30 TABLET | Refills: 0 | Status: SHIPPED | OUTPATIENT
Start: 2023-08-30

## 2023-08-30 RX ORDER — DULOXETIN HYDROCHLORIDE 60 MG/1
60 CAPSULE, DELAYED RELEASE ORAL DAILY
Qty: 30 CAPSULE | Refills: 5 | Status: SHIPPED | OUTPATIENT
Start: 2023-08-30

## 2023-08-30 RX ORDER — DULOXETIN HYDROCHLORIDE 30 MG/1
30 CAPSULE, DELAYED RELEASE ORAL DAILY
Qty: 30 CAPSULE | Refills: 5 | Status: SHIPPED | OUTPATIENT
Start: 2023-08-30

## 2023-08-31 ENCOUNTER — TELEPHONE (OUTPATIENT)
Dept: PHYSICAL MEDICINE AND REHAB | Facility: CLINIC | Age: 78
End: 2023-08-31

## 2023-09-13 ENCOUNTER — TELEPHONE (OUTPATIENT)
Dept: PHYSICAL MEDICINE AND REHAB | Facility: CLINIC | Age: 78
End: 2023-09-13

## 2023-09-13 NOTE — TELEPHONE ENCOUNTER
Spoke with Pt and per pt her new medication Restoril is not helpful at all. In addition pt mentioned that the medication was over 100$ even after insurance. Pt requesting for other medication or recommendations, but does not want Zolpidem. Routing to Dr Zen Cheung for recommendations.

## 2023-09-15 RX ORDER — CYCLOBENZAPRINE HCL 10 MG
10 TABLET ORAL NIGHTLY
Qty: 30 TABLET | Refills: 1 | Status: SHIPPED | OUTPATIENT
Start: 2023-09-15

## 2023-09-20 NOTE — TELEPHONE ENCOUNTER
Spoke with patient who stated she has not picked up the cyclobenzaprine yet and she will try to pick this up on Saturday. Reviewed Allergy prep instructions with patient. Patient stated she has prednisone 10 mg and 50 mg. Reviewed that she is to take the 50 mg 1 tab 13 hrs, 7 hrs and 1 hr prior to injection, along the the 50 mg of benadryl. Per sure scripts data only the 10 mg was dispensed. Spoke with pharmacy how stated patient was given the prednisone 50 mg rx. Nothing further needed at this time.

## 2023-09-27 ENCOUNTER — OFFICE VISIT (OUTPATIENT)
Dept: NEUROLOGY | Facility: CLINIC | Age: 78
End: 2023-09-27
Payer: MEDICARE

## 2023-09-27 VITALS — BODY MASS INDEX: 35.34 KG/M2 | HEIGHT: 60 IN | WEIGHT: 180 LBS

## 2023-09-27 DIAGNOSIS — M54.17 LUMBOSACRAL RADICULOPATHY AT L5: Primary | ICD-10-CM

## 2023-09-27 DIAGNOSIS — G62.9 NEUROPATHY: ICD-10-CM

## 2023-09-27 PROCEDURE — 99204 OFFICE O/P NEW MOD 45 MIN: CPT | Performed by: OTHER

## 2023-09-29 ENCOUNTER — LAB ENCOUNTER (OUTPATIENT)
Dept: LAB | Age: 78
End: 2023-09-29
Attending: Other
Payer: MEDICARE

## 2023-09-29 DIAGNOSIS — G62.9 NEUROPATHY: ICD-10-CM

## 2023-09-29 PROCEDURE — 36415 COLL VENOUS BLD VENIPUNCTURE: CPT

## 2023-09-29 PROCEDURE — 84165 PROTEIN E-PHORESIS SERUM: CPT

## 2023-09-29 PROCEDURE — 84207 ASSAY OF VITAMIN B-6: CPT

## 2023-09-29 PROCEDURE — 86334 IMMUNOFIX E-PHORESIS SERUM: CPT

## 2023-09-29 PROCEDURE — 83521 IG LIGHT CHAINS FREE EACH: CPT

## 2023-09-29 PROCEDURE — 84446 ASSAY OF VITAMIN E: CPT

## 2023-09-29 PROCEDURE — 83921 ORGANIC ACID SINGLE QUANT: CPT

## 2023-09-29 PROCEDURE — 82525 ASSAY OF COPPER: CPT

## 2023-09-29 PROCEDURE — 84425 ASSAY OF VITAMIN B-1: CPT

## 2023-10-02 LAB
ALBUMIN SERPL ELPH-MCNC: 4.29 G/DL (ref 3.75–5.21)
ALBUMIN/GLOB SERPL: 1.42 {RATIO} (ref 1–2)
ALPHA1 GLOB SERPL ELPH-MCNC: 0.25 G/DL (ref 0.19–0.46)
ALPHA2 GLOB SERPL ELPH-MCNC: 1.02 G/DL (ref 0.48–1.05)
B-GLOBULIN SERPL ELPH-MCNC: 0.8 G/DL (ref 0.68–1.23)
GAMMA GLOB SERPL ELPH-MCNC: 0.94 G/DL (ref 0.62–1.7)
KAPPA LC FREE SER-MCNC: 1.35 MG/DL (ref 0.33–1.94)
KAPPA LC FREE/LAMBDA FREE SER NEPH: 1.42 {RATIO} (ref 0.26–1.65)
LAMBDA LC FREE SERPL-MCNC: 0.95 MG/DL (ref 0.57–2.63)
PROT SERPL-MCNC: 7.3 G/DL (ref 6.4–8.2)

## 2023-10-03 ENCOUNTER — OFFICE VISIT (OUTPATIENT)
Dept: INTERNAL MEDICINE CLINIC | Facility: CLINIC | Age: 78
End: 2023-10-03

## 2023-10-03 VITALS
HEIGHT: 60 IN | TEMPERATURE: 98 F | HEART RATE: 82 BPM | SYSTOLIC BLOOD PRESSURE: 122 MMHG | OXYGEN SATURATION: 94 % | RESPIRATION RATE: 20 BRPM | DIASTOLIC BLOOD PRESSURE: 80 MMHG | BODY MASS INDEX: 36.71 KG/M2 | WEIGHT: 187 LBS

## 2023-10-03 DIAGNOSIS — G56.03 BILATERAL CARPAL TUNNEL SYNDROME: ICD-10-CM

## 2023-10-03 DIAGNOSIS — M81.0 AGE-RELATED OSTEOPOROSIS WITHOUT CURRENT PATHOLOGICAL FRACTURE: ICD-10-CM

## 2023-10-03 DIAGNOSIS — M25.562 CHRONIC PAIN OF LEFT KNEE: ICD-10-CM

## 2023-10-03 DIAGNOSIS — M54.42 CHRONIC LEFT-SIDED LOW BACK PAIN WITH LEFT-SIDED SCIATICA: ICD-10-CM

## 2023-10-03 DIAGNOSIS — E53.8 B12 DEFICIENCY: ICD-10-CM

## 2023-10-03 DIAGNOSIS — G89.29 CHRONIC PAIN OF LEFT KNEE: ICD-10-CM

## 2023-10-03 DIAGNOSIS — R07.81 RIB PAIN: ICD-10-CM

## 2023-10-03 DIAGNOSIS — I10 BENIGN HYPERTENSION: ICD-10-CM

## 2023-10-03 DIAGNOSIS — E55.9 VITAMIN D DEFICIENCY: ICD-10-CM

## 2023-10-03 DIAGNOSIS — Z86.16 HISTORY OF COVID-19: ICD-10-CM

## 2023-10-03 DIAGNOSIS — E78.5 HYPERLIPIDEMIA, UNSPECIFIED HYPERLIPIDEMIA TYPE: ICD-10-CM

## 2023-10-03 DIAGNOSIS — Z00.00 PHYSICAL EXAM: Primary | ICD-10-CM

## 2023-10-03 DIAGNOSIS — R73.01 IMPAIRED FASTING GLUCOSE: ICD-10-CM

## 2023-10-03 DIAGNOSIS — G89.29 CHRONIC LEFT-SIDED LOW BACK PAIN WITH LEFT-SIDED SCIATICA: ICD-10-CM

## 2023-10-03 DIAGNOSIS — Z96.652 HISTORY OF LEFT KNEE REPLACEMENT: ICD-10-CM

## 2023-10-03 DIAGNOSIS — G62.9 PERIPHERAL POLYNEUROPATHY: ICD-10-CM

## 2023-10-03 PROBLEM — E87.6 HYPOKALEMIA: Status: RESOLVED | Noted: 2022-08-15 | Resolved: 2023-10-03

## 2023-10-03 PROBLEM — R09.02 HYPOXIA: Status: RESOLVED | Noted: 2022-08-15 | Resolved: 2023-10-03

## 2023-10-03 LAB
ATRIAL RATE: 71 BPM
METHYLMALONIC ACID: 253 NMOL/L
P AXIS: 40 DEGREES
P-R INTERVAL: 118 MS
Q-T INTERVAL: 382 MS
QRS DURATION: 90 MS
QTC CALCULATION (BEZET): 415 MS
R AXIS: 6 DEGREES
T AXIS: 3 DEGREES
VENTRICULAR RATE: 71 BPM
VIT E ALPHA TOCO: 10 MG/L
VIT E GAMMA TOCO: 1.5 MG/L

## 2023-10-03 PROCEDURE — G0008 ADMIN INFLUENZA VIRUS VAC: HCPCS | Performed by: INTERNAL MEDICINE

## 2023-10-03 PROCEDURE — 96372 THER/PROPH/DIAG INJ SC/IM: CPT | Performed by: INTERNAL MEDICINE

## 2023-10-03 PROCEDURE — 90662 IIV NO PRSV INCREASED AG IM: CPT | Performed by: INTERNAL MEDICINE

## 2023-10-03 PROCEDURE — G0439 PPPS, SUBSEQ VISIT: HCPCS | Performed by: INTERNAL MEDICINE

## 2023-10-03 PROCEDURE — 99204 OFFICE O/P NEW MOD 45 MIN: CPT | Performed by: INTERNAL MEDICINE

## 2023-10-03 PROCEDURE — 93000 ELECTROCARDIOGRAM COMPLETE: CPT | Performed by: INTERNAL MEDICINE

## 2023-10-03 RX ORDER — CYANOCOBALAMIN 1000 UG/ML
1000 INJECTION, SOLUTION INTRAMUSCULAR; SUBCUTANEOUS ONCE
Status: COMPLETED | OUTPATIENT
Start: 2023-10-03 | End: 2023-10-03

## 2023-10-03 RX ORDER — METOPROLOL SUCCINATE 25 MG/1
25 TABLET, EXTENDED RELEASE ORAL DAILY
Qty: 90 TABLET | Refills: 1 | Status: SHIPPED | OUTPATIENT
Start: 2023-10-03

## 2023-10-03 RX ADMIN — CYANOCOBALAMIN 1000 MCG: 1000 INJECTION, SOLUTION INTRAMUSCULAR; SUBCUTANEOUS at 12:31:00

## 2023-10-03 NOTE — PATIENT INSTRUCTIONS
1. Physical exam  Physical exam instruction: Improve diet and exercise, complete fasting labs in the near future and you will be called with results 5-7 days after completed, call with questions. Call the central scheduling number at [phone] to schedule at any of the [facility] locations  -Fasting lab work sometime in the spring after January 28  - CBC With Differential With Platelet; Future  - Comp Metabolic Panel (14); Future  - Lipid Panel; Future  - TSH W Reflex To Free T4; Future  - Uric Acid; Future  - Urinalysis with Culture Reflex    2. Impaired fasting glucose  Lets make sure we get some lab work in the spring time here, and keep an eye on this  - Hemoglobin A1C; Future  - Microalb/Creat Ratio, Random Urine; Future    3. Benign hypertension  Lets discontinue the amlodipine, start metoprolol 25 mg daily, this should be a little better on the heart, slowing the heart rate, and controlling the blood pressure, heart protective is well  - metoprolol succinate ER 25 MG Oral Tablet 24 Hr; Take 1 tablet (25 mg total) by mouth daily. Dispense: 90 tablet; Refill: 1    4. Hyperlipidemia, unspecified hyperlipidemia type  Nice job on cholesterol control, no changes there    5. B12 deficiency  I do like the idea of testing a B12 injection today, seeing how this works, if you feel good from a lower extremity neuropathy, or energy standpoint we may need to repeat this anywhere from 1 to 4 weeks from now to see how well and how long it lasts. Let me know  - Vitamin B12 [E]; Future  - cyanocobalamin (Vitamin B12) 1000 MCG/ML injection 1,000 mcg    6. Peripheral polyneuropathy  As above, continue current gabapentin, and B12 injection should help    7. Age-related osteoporosis without current pathological fracture  Stable continue current monitoring management    8. Bilateral carpal tunnel syndrome  Stable continue current monitoring management    9.  History of COVID-19  Stable continue current monitoring management    10. Chronic pain of left knee with intact prosthesis  Stable continue current monitoring management  - Sed Rate, Westergren (Automated) [E]; Future    11. 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 Chronic left-sided low back pain with left-sided sciatica  Stable continue current monitoring management    13. Vitamin D deficiency  Stable continue current monitoring management  - Vitamin D; Future    14. Rib pain  EKG today, lets take conservative approach here, pain control, stretching, letting me know if something is not working, some people do use chiropractics for this, before we attempt something like this I need a phone call.   - ELECTROCARDIOGRAM, COMPLETE

## 2023-10-04 LAB
VITAMIN B1 WHOLE BLD: 136.5 NMOL/L
VITAMIN B6: 11.6 UG/L

## 2023-10-05 ENCOUNTER — TELEPHONE (OUTPATIENT)
Dept: INTERNAL MEDICINE CLINIC | Facility: CLINIC | Age: 78
End: 2023-10-05

## 2023-10-05 NOTE — TELEPHONE ENCOUNTER
Spoke to  pt to clarify Dr Massiel Sigala' notes from [address] on 10/3. Pt understands to have labs drawn between Feb and March prior to appt with   Dr Ngozi Weems on 4/4/24.     Call the central scheduling number at [phone] to schedule at any of the [facility] locations  -Fasting lab work sometime in the spring after January 28

## 2023-10-05 NOTE — TELEPHONE ENCOUNTER
Patient is calling she received a voicemail on 10/4, she was unable to hear all of the message. The message stated she needs to have tests done.     Please call patient back with information  Phone

## 2023-10-09 LAB — COPPER: 107 UG/DL

## 2023-10-17 ENCOUNTER — TELEPHONE (OUTPATIENT)
Dept: INTERNAL MEDICINE CLINIC | Facility: CLINIC | Age: 78
End: 2023-10-17

## 2023-10-17 DIAGNOSIS — E78.5 HYPERLIPIDEMIA, UNSPECIFIED HYPERLIPIDEMIA TYPE: Primary | ICD-10-CM

## 2023-10-18 ENCOUNTER — TELEPHONE (OUTPATIENT)
Dept: NEUROLOGY | Facility: CLINIC | Age: 78
End: 2023-10-18

## 2023-10-18 NOTE — TELEPHONE ENCOUNTER
Normal letter has been mailed to the patient. Reviewed and electronically signed by:  [address], [facility]

## 2023-10-18 NOTE — TELEPHONE ENCOUNTER
Please call the patient and let her know that all the blood work ordered during her neurology office visit looks normal.  We were checking her monoclonal proteins, vitamin E,copper, vitamin B12, vitamin B1 and vitamin B6 levels. Thanks!

## 2023-10-19 RX ORDER — ATORVASTATIN CALCIUM 40 MG/1
40 TABLET, FILM COATED ORAL NIGHTLY
Qty: 90 TABLET | Refills: 3 | Status: SHIPPED | OUTPATIENT
Start: 2023-10-19

## 2023-11-06 ENCOUNTER — TELEPHONE (OUTPATIENT)
Dept: INTERNAL MEDICINE CLINIC | Facility: CLINIC | Age: 78
End: 2023-11-06

## 2023-11-06 NOTE — TELEPHONE ENCOUNTER
Refill Request    Medication request: acetaminophen-codeine 300-30 MG Oral Tab. Take 1 tablet by mouth daily as needed. Kilo Cohen MD   Due back to clinic per last office note: The patient will follow up in 2-3 months   NOV: 11/22/2023 Tania Darling MD      [place]: 08/30/2023 #30 (8 days)    Urine drug screen (if applicable): none  Pain contract: none    LOV plan (if weaning or changing medications): She will continue with th Cymbalta and the Tylenol #3 for the pain and the gabapentin.

## 2023-11-07 ENCOUNTER — LAB ENCOUNTER (OUTPATIENT)
Dept: LAB | Age: 78
End: 2023-11-07
Attending: INTERNAL MEDICINE
Payer: MEDICARE

## 2023-11-07 DIAGNOSIS — G89.29 CHRONIC PAIN OF LEFT KNEE: ICD-10-CM

## 2023-11-07 DIAGNOSIS — R73.01 IMPAIRED FASTING GLUCOSE: ICD-10-CM

## 2023-11-07 DIAGNOSIS — E53.8 B12 DEFICIENCY: ICD-10-CM

## 2023-11-07 DIAGNOSIS — E55.9 VITAMIN D DEFICIENCY: ICD-10-CM

## 2023-11-07 DIAGNOSIS — Z00.00 PHYSICAL EXAM: ICD-10-CM

## 2023-11-07 DIAGNOSIS — M25.562 CHRONIC PAIN OF LEFT KNEE: ICD-10-CM

## 2023-11-07 LAB
BASOPHILS # BLD AUTO: 0.09 X10(3) UL (ref 0–0.2)
BASOPHILS NFR BLD AUTO: 1 %
BILIRUB UR QL: NEGATIVE
COLOR UR: YELLOW
CREAT UR-SCNC: 144.9 MG/DL
DEPRECATED RDW RBC AUTO: 46.5 FL (ref 35.1–46.3)
EOSINOPHIL # BLD AUTO: 0.11 X10(3) UL (ref 0–0.7)
EOSINOPHIL NFR BLD AUTO: 1.2 %
ERYTHROCYTE [DISTWIDTH] IN BLOOD BY AUTOMATED COUNT: 13.7 % (ref 11–15)
ERYTHROCYTE [SEDIMENTATION RATE] IN BLOOD: 51 MM/HR
EST. AVERAGE GLUCOSE BLD GHB EST-MCNC: 137 MG/DL (ref 68–126)
GLUCOSE UR-MCNC: NORMAL MG/DL
HBA1C MFR BLD: 6.4 % (ref ?–5.7)
HCT VFR BLD AUTO: 48.1 %
HGB BLD-MCNC: 15.3 G/DL
HYALINE CASTS #/AREA URNS AUTO: PRESENT /LPF
IMM GRANULOCYTES # BLD AUTO: 0.09 X10(3) UL (ref 0–1)
IMM GRANULOCYTES NFR BLD: 1 %
LEUKOCYTE ESTERASE UR QL STRIP.AUTO: 500
LYMPHOCYTES # BLD AUTO: 2.9 X10(3) UL (ref 1–4)
LYMPHOCYTES NFR BLD AUTO: 31.5 %
MCH RBC QN AUTO: 28.9 PG (ref 26–34)
MCHC RBC AUTO-ENTMCNC: 31.8 G/DL (ref 31–37)
MCV RBC AUTO: 90.9 FL
MICROALBUMIN UR-MCNC: 3.9 MG/DL
MICROALBUMIN/CREAT 24H UR-RTO: 26.9 UG/MG (ref ?–30)
MONOCYTES # BLD AUTO: 0.84 X10(3) UL (ref 0.1–1)
MONOCYTES NFR BLD AUTO: 9.1 %
NEUTROPHILS # BLD AUTO: 5.19 X10 (3) UL (ref 1.5–7.7)
NEUTROPHILS # BLD AUTO: 5.19 X10(3) UL (ref 1.5–7.7)
NEUTROPHILS NFR BLD AUTO: 56.2 %
PH UR: 5.5 [PH] (ref 5–8)
PLATELET # BLD AUTO: 323 10(3)UL (ref 150–450)
PROT UR-MCNC: 20 MG/DL
RBC # BLD AUTO: 5.29 X10(6)UL
SP GR UR STRIP: 1.02 (ref 1–1.03)
UROBILINOGEN UR STRIP-ACNC: NORMAL
VIT B12 SERPL-MCNC: 345 PG/ML (ref 211–911)
VIT D+METAB SERPL-MCNC: 27 NG/ML (ref 30–100)
WBC # BLD AUTO: 9.2 X10(3) UL (ref 4–11)
WBC #/AREA URNS AUTO: >50 /HPF
WBC CLUMPS UR QL AUTO: PRESENT /HPF

## 2023-11-07 PROCEDURE — 84550 ASSAY OF BLOOD/URIC ACID: CPT

## 2023-11-07 PROCEDURE — 82306 VITAMIN D 25 HYDROXY: CPT

## 2023-11-07 PROCEDURE — 85025 COMPLETE CBC W/AUTO DIFF WBC: CPT

## 2023-11-07 PROCEDURE — 87088 URINE BACTERIA CULTURE: CPT

## 2023-11-07 PROCEDURE — 84443 ASSAY THYROID STIM HORMONE: CPT

## 2023-11-07 PROCEDURE — 36415 COLL VENOUS BLD VENIPUNCTURE: CPT

## 2023-11-07 PROCEDURE — 82570 ASSAY OF URINE CREATININE: CPT

## 2023-11-07 PROCEDURE — 87086 URINE CULTURE/COLONY COUNT: CPT

## 2023-11-07 PROCEDURE — 87186 SC STD MICRODIL/AGAR DIL: CPT

## 2023-11-07 PROCEDURE — 82043 UR ALBUMIN QUANTITATIVE: CPT

## 2023-11-07 PROCEDURE — 85652 RBC SED RATE AUTOMATED: CPT

## 2023-11-07 PROCEDURE — 80061 LIPID PANEL: CPT

## 2023-11-07 PROCEDURE — 81001 URINALYSIS AUTO W/SCOPE: CPT

## 2023-11-07 PROCEDURE — 80053 COMPREHEN METABOLIC PANEL: CPT

## 2023-11-07 PROCEDURE — 82607 VITAMIN B-12: CPT

## 2023-11-07 PROCEDURE — 83036 HEMOGLOBIN GLYCOSYLATED A1C: CPT

## 2023-11-08 ENCOUNTER — TELEPHONE (OUTPATIENT)
Dept: INTERNAL MEDICINE CLINIC | Facility: CLINIC | Age: 78
End: 2023-11-08

## 2023-11-08 LAB
ALBUMIN SERPL-MCNC: 4.7 G/DL (ref 3.2–4.8)
ALBUMIN/GLOB SERPL: 1.7 {RATIO} (ref 1–2)
ALP LIVER SERPL-CCNC: 65 U/L
ALT SERPL-CCNC: 19 U/L
ANION GAP SERPL CALC-SCNC: 5.9 MMOL/L (ref 0–18)
AST SERPL-CCNC: 20 U/L (ref ?–34)
BILIRUB SERPL-MCNC: 0.6 MG/DL (ref 0.2–1.1)
BUN BLD-MCNC: 10 MG/DL (ref 9–23)
BUN/CREAT SERPL: 13.3 (ref 10–20)
CALCIUM BLD-MCNC: 10.1 MG/DL (ref 8.7–10.4)
CHLORIDE SERPL-SCNC: 101 MMOL/L (ref 98–112)
CHOLEST SERPL-MCNC: 166 MG/DL (ref ?–200)
CO2 SERPL-SCNC: 30.1 MMOL/L (ref 21–32)
CREAT BLD-MCNC: 0.75 MG/DL
EGFRCR SERPLBLD CKD-EPI 2021: 80 ML/MIN/1.73M2 (ref 60–?)
FASTING PATIENT LIPID ANSWER: YES
FASTING STATUS PATIENT QL REPORTED: YES
GLOBULIN PLAS-MCNC: 2.7 G/DL (ref 2.8–4.4)
GLUCOSE BLD-MCNC: 114 MG/DL (ref 70–99)
HDLC SERPL-MCNC: 51 MG/DL (ref 40–59)
LDLC SERPL CALC-MCNC: 95 MG/DL (ref ?–100)
NONHDLC SERPL-MCNC: 115 MG/DL (ref ?–130)
OSMOLALITY SERPL CALC.SUM OF ELEC: 284 MOSM/KG (ref 275–295)
POTASSIUM SERPL-SCNC: 4.2 MMOL/L (ref 3.5–5.1)
PROT SERPL-MCNC: 7.4 G/DL (ref 5.7–8.2)
SODIUM SERPL-SCNC: 137 MMOL/L (ref 136–145)
TRIGL SERPL-MCNC: 109 MG/DL (ref 30–149)
TSI SER-ACNC: 2.27 MIU/ML (ref 0.55–4.78)
URATE SERPL-MCNC: 3.5 MG/DL
VLDLC SERPL CALC-MCNC: 18 MG/DL (ref 0–30)

## 2023-11-08 RX ORDER — CEPHALEXIN 500 MG/1
500 CAPSULE ORAL 2 TIMES DAILY
Qty: 10 CAPSULE | Refills: 0 | Status: SHIPPED | OUTPATIENT
Start: 2023-11-08

## 2023-11-08 NOTE — TELEPHONE ENCOUNTER
Routed to Dr. Tayla Abreu (can you please help with this?)-- patient's urine from 11/7/23 is showing e.coli. Please advise on treatment.  Thank you!!

## 2023-11-09 RX ORDER — ACETAMINOPHEN AND CODEINE PHOSPHATE 300; 30 MG/1; MG/1
1 TABLET ORAL EVERY 6 HOURS PRN
Qty: 30 TABLET | Refills: 0 | Status: SHIPPED | OUTPATIENT
Start: 2023-11-09

## 2023-11-09 RX ORDER — METHOCARBAMOL 750 MG/1
750 TABLET, FILM COATED ORAL 3 TIMES DAILY
Qty: 270 TABLET | Refills: 1 | Status: SHIPPED | OUTPATIENT
Start: 2023-11-09

## 2023-11-09 RX ORDER — ALENDRONATE SODIUM 35 MG/1
35 TABLET ORAL WEEKLY
Qty: 13 TABLET | Refills: 3 | Status: SHIPPED | OUTPATIENT
Start: 2023-11-09

## 2023-11-09 RX ORDER — DULOXETIN HYDROCHLORIDE 30 MG/1
30 CAPSULE, DELAYED RELEASE ORAL EVERY MORNING
Qty: 90 CAPSULE | Refills: 3 | Status: SHIPPED | OUTPATIENT
Start: 2023-11-09

## 2023-11-09 NOTE — TELEPHONE ENCOUNTER
Spoke with patient- she is requesting refills on Alendronate, Methocarbamol, and Duloxetine 30mg. Rx's pended. Patient notified that requests would be sent to Dr. Abbe Dimas and he will address when he returns. Routed to Dr. Abbe Dimas-- please advise on refills- first time fills for our office. Patient new on 10/3/23. Thanks!

## 2023-11-09 NOTE — TELEPHONE ENCOUNTER
Spoke with patient and relayed Dr. Shira Parker' message. Patient verbalized an understanding. She denies any urinary symptoms at this time.    eRx sent to [facility] per patient's request.

## 2023-11-10 ENCOUNTER — TELEPHONE (OUTPATIENT)
Dept: INTERNAL MEDICINE CLINIC | Facility: CLINIC | Age: 78
End: 2023-11-10

## 2023-11-10 DIAGNOSIS — E53.8 VITAMIN B 12 DEFICIENCY: Primary | ICD-10-CM

## 2023-11-10 RX ORDER — CYANOCOBALAMIN 1000 UG/ML
1000 INJECTION, SOLUTION INTRAMUSCULAR; SUBCUTANEOUS
Status: CANCELLED | OUTPATIENT
Start: 2023-11-10 | End: 2024-11-04

## 2023-11-10 NOTE — TELEPHONE ENCOUNTER
----- Message from Eugenia Nair, DO sent at 11/7/2023  6:13 PM CST -----  Nursing, if you could, lets call this patient, let her know the majority the lab work looks good, she could use some vitamin D supplementation, 5000 international units daily over-the-counter vitamin D would be recommended. The rest of the lab work has minor abnormalities but nothing I am too concerned about, hemoglobin A1c looks controlled, the sed rate is mildly elevated at 50, I am not sure exactly what to make of this. For now I am not too concerned about it and would just recommend she follows up as we last discussed. Lets see if we can gauge with her how the B12 shot worked, encouraged follow-up when her  follows up in 4 to 6 weeks if she needs.

## 2023-11-10 NOTE — TELEPHONE ENCOUNTER
Okay, you can offer her B12 injections every month, place an order for 12 occurrences under the diagnosis of vitamin B12 deficiency, and see if she wants to come in for those. Let her know if her legs continue to bother her I do have other options that she should reach out to me.   We will certainly talk about it at her next visit

## 2023-11-10 NOTE — TELEPHONE ENCOUNTER
Spoke with patient. Relayed MD message. Pt verbalized understanding. To Dr. MARQUIS:  Pt states overall she thinks her neuropathy is better during the day. She did report she gets \"shocking\" pains in legs at night during sleep. She states this occurs about 6-7x nightly. She has to get out of bed and then pain resolves.

## 2023-11-10 NOTE — TELEPHONE ENCOUNTER
Jennie Bravo from [address] calling for Diagnosis code for Tylenol #3. Dx code given: 54.16. Nothing further needed at this time.

## 2023-11-13 ENCOUNTER — TELEPHONE (OUTPATIENT)
Dept: INTERNAL MEDICINE CLINIC | Facility: CLINIC | Age: 78
End: 2023-11-13

## 2023-11-13 NOTE — TELEPHONE ENCOUNTER
----- Message from Jaida Hamilton DO sent at 11/12/2023  8:55 AM CST -----  Nursing if you could reach out to the patient, let him know I did review the rest of his lab work, I do like the idea of him completing the medication started by the on-call physician for the mild urine infection. I do not know what his history is with that but they should be following up with me in the office in the next 2 to 3 months as planned.   The rest of the lab work looks okay continue all current other medication, no changes

## 2023-12-18 ENCOUNTER — LAB ENCOUNTER (OUTPATIENT)
Dept: LAB | Age: 78
End: 2023-12-18
Attending: INTERNAL MEDICINE
Payer: MEDICARE

## 2023-12-18 ENCOUNTER — OFFICE VISIT (OUTPATIENT)
Dept: INTERNAL MEDICINE CLINIC | Facility: CLINIC | Age: 78
End: 2023-12-18

## 2023-12-18 VITALS
SYSTOLIC BLOOD PRESSURE: 118 MMHG | HEART RATE: 80 BPM | WEIGHT: 206 LBS | DIASTOLIC BLOOD PRESSURE: 70 MMHG | BODY MASS INDEX: 40.44 KG/M2 | HEIGHT: 60 IN | OXYGEN SATURATION: 94 %

## 2023-12-18 DIAGNOSIS — R42 DIZZINESS: ICD-10-CM

## 2023-12-18 DIAGNOSIS — G62.9 PERIPHERAL POLYNEUROPATHY: ICD-10-CM

## 2023-12-18 DIAGNOSIS — E78.5 HYPERLIPIDEMIA, UNSPECIFIED HYPERLIPIDEMIA TYPE: ICD-10-CM

## 2023-12-18 DIAGNOSIS — N39.0 URINARY TRACT INFECTION WITHOUT HEMATURIA, SITE UNSPECIFIED: ICD-10-CM

## 2023-12-18 DIAGNOSIS — I10 BENIGN HYPERTENSION: ICD-10-CM

## 2023-12-18 DIAGNOSIS — R42 VERTIGO: Primary | ICD-10-CM

## 2023-12-18 DIAGNOSIS — R25.1 TREMOR OF BOTH HANDS: ICD-10-CM

## 2023-12-18 LAB
ALBUMIN SERPL-MCNC: 4.6 G/DL (ref 3.2–4.8)
ALBUMIN/GLOB SERPL: 1.6 {RATIO} (ref 1–2)
ALP LIVER SERPL-CCNC: 70 U/L
ALT SERPL-CCNC: 18 U/L
ANION GAP SERPL CALC-SCNC: 3 MMOL/L (ref 0–18)
AST SERPL-CCNC: 23 U/L (ref ?–34)
BASOPHILS # BLD AUTO: 0.08 X10(3) UL (ref 0–0.2)
BASOPHILS NFR BLD AUTO: 0.7 %
BILIRUB SERPL-MCNC: 0.3 MG/DL (ref 0.2–1.1)
BILIRUB UR QL: NEGATIVE
BUN BLD-MCNC: 12 MG/DL (ref 9–23)
BUN/CREAT SERPL: 12.4 (ref 10–20)
CALCIUM BLD-MCNC: 10 MG/DL (ref 8.7–10.4)
CHLORIDE SERPL-SCNC: 104 MMOL/L (ref 98–112)
CO2 SERPL-SCNC: 30 MMOL/L (ref 21–32)
COLOR UR: YELLOW
CREAT BLD-MCNC: 0.97 MG/DL
DEPRECATED RDW RBC AUTO: 47.9 FL (ref 35.1–46.3)
EGFRCR SERPLBLD CKD-EPI 2021: 59 ML/MIN/1.73M2 (ref 60–?)
EOSINOPHIL # BLD AUTO: 0.17 X10(3) UL (ref 0–0.7)
EOSINOPHIL NFR BLD AUTO: 1.5 %
ERYTHROCYTE [DISTWIDTH] IN BLOOD BY AUTOMATED COUNT: 14.2 % (ref 11–15)
FASTING STATUS PATIENT QL REPORTED: NO
GLOBULIN PLAS-MCNC: 2.9 G/DL (ref 2.8–4.4)
GLUCOSE BLD-MCNC: 85 MG/DL (ref 70–99)
GLUCOSE UR-MCNC: NORMAL MG/DL
HCT VFR BLD AUTO: 46.2 %
HGB BLD-MCNC: 14.6 G/DL
IMM GRANULOCYTES # BLD AUTO: 0.13 X10(3) UL (ref 0–1)
IMM GRANULOCYTES NFR BLD: 1.2 %
KETONES UR-MCNC: NEGATIVE MG/DL
LEUKOCYTE ESTERASE UR QL STRIP.AUTO: 500
LYMPHOCYTES # BLD AUTO: 3.03 X10(3) UL (ref 1–4)
LYMPHOCYTES NFR BLD AUTO: 27.4 %
MAGNESIUM SERPL-MCNC: 2 MG/DL (ref 1.6–2.6)
MCH RBC QN AUTO: 29 PG (ref 26–34)
MCHC RBC AUTO-ENTMCNC: 31.6 G/DL (ref 31–37)
MCV RBC AUTO: 91.7 FL
MONOCYTES # BLD AUTO: 1.21 X10(3) UL (ref 0.1–1)
MONOCYTES NFR BLD AUTO: 10.9 %
NEUTROPHILS # BLD AUTO: 6.45 X10 (3) UL (ref 1.5–7.7)
NEUTROPHILS # BLD AUTO: 6.45 X10(3) UL (ref 1.5–7.7)
NEUTROPHILS NFR BLD AUTO: 58.3 %
NITRITE UR QL STRIP.AUTO: NEGATIVE
OSMOLALITY SERPL CALC.SUM OF ELEC: 283 MOSM/KG (ref 275–295)
PH UR: 6 [PH] (ref 5–8)
PLATELET # BLD AUTO: 330 10(3)UL (ref 150–450)
POTASSIUM SERPL-SCNC: 4.4 MMOL/L (ref 3.5–5.1)
PROT SERPL-MCNC: 7.5 G/DL (ref 5.7–8.2)
PROT UR-MCNC: 20 MG/DL
RBC # BLD AUTO: 5.04 X10(6)UL
RBC #/AREA URNS AUTO: >10 /HPF
SODIUM SERPL-SCNC: 137 MMOL/L (ref 136–145)
SP GR UR STRIP: 1.02 (ref 1–1.03)
UROBILINOGEN UR STRIP-ACNC: NORMAL
WBC # BLD AUTO: 11.1 X10(3) UL (ref 4–11)
WBC #/AREA URNS AUTO: >50 /HPF
WBC CLUMPS UR QL AUTO: PRESENT /HPF

## 2023-12-18 PROCEDURE — 87186 SC STD MICRODIL/AGAR DIL: CPT

## 2023-12-18 PROCEDURE — 87086 URINE CULTURE/COLONY COUNT: CPT

## 2023-12-18 PROCEDURE — 83735 ASSAY OF MAGNESIUM: CPT

## 2023-12-18 PROCEDURE — 80053 COMPREHEN METABOLIC PANEL: CPT

## 2023-12-18 PROCEDURE — 81001 URINALYSIS AUTO W/SCOPE: CPT

## 2023-12-18 PROCEDURE — 36415 COLL VENOUS BLD VENIPUNCTURE: CPT

## 2023-12-18 PROCEDURE — 99214 OFFICE O/P EST MOD 30 MIN: CPT | Performed by: INTERNAL MEDICINE

## 2023-12-18 PROCEDURE — 85025 COMPLETE CBC W/AUTO DIFF WBC: CPT

## 2023-12-18 PROCEDURE — 87088 URINE BACTERIA CULTURE: CPT

## 2023-12-18 RX ORDER — AMLODIPINE BESYLATE 2.5 MG/1
TABLET ORAL
COMMUNITY
Start: 2022-09-23

## 2023-12-18 NOTE — PATIENT INSTRUCTIONS
1. Vertigo  I like the idea of watching for symptoms here, we can use some meclizine to calm this, 12.5 mg 3 times daily as needed, but at this point in time lets try to hold the amlodipine for a few days let your blood pressure come up, get well-hydrated and get some lab work and urine testing done    2. Dizziness  As above, holding amlodipine for 5 days at least    3. Urinary tract infection without hematuria, site unspecified  Lets get the urine testing done, repeat sample  - Urinalysis, Routine; Future  - Urine Culture, Routine; Future    4. Peripheral polyneuropathy  Stable continue current monitoring management    5. Benign hypertension  Stable continue current monitoring and management    6. Hyperlipidemia, unspecified hyperlipidemia type  Stable continue current monitoring and management    7. Tremor of both hands  Unknown cause here, looks like this may be related to the current process of dizziness, lets get some lab work and we can go from there.

## 2023-12-19 ENCOUNTER — TELEPHONE (OUTPATIENT)
Dept: INTERNAL MEDICINE CLINIC | Facility: CLINIC | Age: 78
End: 2023-12-19

## 2023-12-19 DIAGNOSIS — N39.0 URINARY TRACT INFECTION WITHOUT HEMATURIA, SITE UNSPECIFIED: Primary | ICD-10-CM

## 2023-12-19 RX ORDER — SULFAMETHOXAZOLE AND TRIMETHOPRIM 800; 160 MG/1; MG/1
1 TABLET ORAL 2 TIMES DAILY
Qty: 14 TABLET | Refills: 0 | Status: SHIPPED | OUTPATIENT
Start: 2023-12-19

## 2023-12-20 ENCOUNTER — TELEPHONE (OUTPATIENT)
Dept: INTERNAL MEDICINE CLINIC | Facility: CLINIC | Age: 78
End: 2023-12-20

## 2023-12-20 NOTE — TELEPHONE ENCOUNTER
1 Result Note       2 Follow-up Encounters  URINE CULTURE >100,000 CFU/ML Escherichia coli Abnormal            Resulting Agency: [facility] ([facility])     Susceptibility     Escherichia coli     Not Specified    Ampicillin <=2 Sensitive    Cefazolin <=4 Sensitive    Ciprofloxacin <=0.25 Sensitive    Gentamicin <=1 Sensitive    Levofloxacin <=0.12 Sensitive    Meropenem <=0.25 Sensitive    Nitrofurantoin <=16 Sensitive    Piperacillin + Tazobactam <=4 Sensitive    Trimethoprim/Sulfa <=20 Sensitive                    Please advise for  - patient is on Bactrim  To

## 2023-12-20 NOTE — TELEPHONE ENCOUNTER
Nursing, recent review of lab work displayed that she has E. coli in the urine, awaiting sensitivities still.   I cannot send her a [facility] message, but I would like you to call her on Wednesday morning, make sure she gets and takes the antibiotics sent to her pharmacy Bactrim twice daily for 7 days, when I see the full results of the full sensitivities I will notify her.    -The rest of the lab work looks okay some mild abnormalities on not too concerned about, no other changes in medication just the addition of the mild antibiotics

## 2024-01-09 NOTE — TELEPHONE ENCOUNTER
Bren/ pharmacist from [facility] left voicemail message requesting follow up on refill request for gabapentin 800 mg

## 2024-01-11 RX ORDER — GABAPENTIN 400 MG/1
800 CAPSULE ORAL 2 TIMES DAILY
Qty: 180 CAPSULE | Refills: 3 | Status: SHIPPED | OUTPATIENT
Start: 2024-01-11

## 2024-02-09 ENCOUNTER — MED REC SCAN ONLY (OUTPATIENT)
Dept: PHYSICAL MEDICINE AND REHAB | Facility: CLINIC | Age: 79
End: 2024-02-09

## 2024-02-12 RX ORDER — DULOXETIN HYDROCHLORIDE 30 MG/1
30 CAPSULE, DELAYED RELEASE ORAL DAILY
Qty: 30 CAPSULE | Refills: 0 | Status: SHIPPED | OUTPATIENT
Start: 2024-02-12

## 2024-02-12 NOTE — TELEPHONE ENCOUNTER
Refill Request    Medication request: DULoxetine 30 MG Oral Cap DR Particles. Take 1 capsule (30 mg total) by mouth every morning.      LOV:8/30/2023 Marcelino Chong MD   Due back to clinic per last office note:  The patient will follow up in 2-3 months, but the patient will call me 2 weeks after having the injection to let me know how the injection worked.   NOV: Visit date not found      [place]: 1/29/2024 #30    Urine drug screen (if applicable): N/A  Pain contract: N/A    LOV plan (if weaning or changing medications): She will continue with the Cymbalta and the Tylenol #3 for the pain and the gabapentin.

## 2024-02-21 ENCOUNTER — TELEPHONE (OUTPATIENT)
Dept: PHYSICAL THERAPY | Facility: HOSPITAL | Age: 79
End: 2024-02-21

## 2024-02-21 ENCOUNTER — TELEPHONE (OUTPATIENT)
Dept: PHYSICAL MEDICINE AND REHAB | Facility: CLINIC | Age: 79
End: 2024-02-21

## 2024-02-21 ENCOUNTER — OFFICE VISIT (OUTPATIENT)
Dept: PHYSICAL THERAPY | Age: 79
End: 2024-02-21
Attending: PHYSICAL MEDICINE & REHABILITATION
Payer: MEDICARE

## 2024-02-21 DIAGNOSIS — M96.1 LUMBAR POST-LAMINECTOMY SYNDROME: ICD-10-CM

## 2024-02-21 DIAGNOSIS — G60.8 SENSORY PERIPHERAL NEUROPATHY: ICD-10-CM

## 2024-02-21 DIAGNOSIS — M51.26 LUMBAR HERNIATED DISC: ICD-10-CM

## 2024-02-21 DIAGNOSIS — M54.16 LUMBAR RADICULOPATHY: Primary | ICD-10-CM

## 2024-02-21 DIAGNOSIS — M25.562 CHRONIC PAIN OF LEFT KNEE: ICD-10-CM

## 2024-02-21 DIAGNOSIS — M48.061 LUMBAR FORAMINAL STENOSIS: ICD-10-CM

## 2024-02-21 DIAGNOSIS — Z96.652 HISTORY OF LEFT KNEE REPLACEMENT: ICD-10-CM

## 2024-02-21 DIAGNOSIS — M51.9 LUMBAR DISC DISEASE: ICD-10-CM

## 2024-02-21 DIAGNOSIS — G89.29 CHRONIC LEFT-SIDED LOW BACK PAIN WITH LEFT-SIDED SCIATICA: ICD-10-CM

## 2024-02-21 DIAGNOSIS — M43.16 SPONDYLOLISTHESIS OF LUMBAR REGION: ICD-10-CM

## 2024-02-21 DIAGNOSIS — G89.29 CHRONIC PAIN OF LEFT KNEE: ICD-10-CM

## 2024-02-21 DIAGNOSIS — M54.42 CHRONIC LEFT-SIDED LOW BACK PAIN WITH LEFT-SIDED SCIATICA: ICD-10-CM

## 2024-02-21 PROCEDURE — 97161 PT EVAL LOW COMPLEX 20 MIN: CPT | Performed by: PHYSICAL THERAPIST

## 2024-02-21 NOTE — TELEPHONE ENCOUNTER
Received a call from yolanda Luke's daughter in law asking if there are additional information needed from PT. Spoke with PT Fermín Nash who stated \"I messaged Dr. Chong requesting for updated PT orders since her referral was from 8/30/2023. She also has some significant weakness and atrophy in her (L)LE along with involuntary movements which frequently throw her off balance in standing. Her Wagner score is 24 which is consistent with her frequent falls and she was advised to use her walker instead which she was not very receptive to but she will likely continue to fall frequently without adequate upper extremity support. Not sure when she is due for a follow-up with Dr. Chong but an updated Rx would satisfy CMS guidelines for initiating physical therapy treatments. \"

## 2024-02-21 NOTE — PROGRESS NOTES
SPINE EVALUATION:   Referring Physician: Dr. Chong  Diagnosis: Lumbar radiculopathy (M54.16)  Sensory peripheral neuropathy (G60.8)  Lumbar herniated disc (M51.26)  Lumbar foraminal stenosis (M48.061)  Lumbar disc disease (M51.9)  Lumbar post-laminectomy syndrome (M96.1)  Spondylolisthesis of lumbar region (M43.16)  History of left knee replacement (Z96.652)  Chronic pain of left knee (M25.562,G89.29)     Date of Service: 2/21/2024  MEDICATION CHANGES SINCE LAST MD REVIEW? NO     PATIENT SUMMARY   Fidelia Villar is a 78 year old female who presents to therapy today with complaints of (L) knee and back pain radiating to the (L) foot and all the toes since 10 years ago for no apparent cause; also has (B) knee pain with TKR but only has relief on the (R) and surgical options for her (L) knee has apparently been exhausted.  Pt describes pain level current 9/10, at best 9/10, at worst 10/10.   Symptoms aggravated by all activities.  Current functional limitations include difficulty doing housework due to severe pain.     Afroditi describes prior level of function gradually deteriorating with (B)shoulder, knee and progressive back problems. 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  Pt goals include pain relief and increase activity tolerance.  Past medical history was reviewed with Fidelia. Significant findings include (B) TKR. (R) shoulder surgery (unknown) has a past medical history of Cataract, Coronary atherosclerosis, High cholesterol, HYPERLIPIDEMIA, OSTEOARTHRITIS, Osteoarthritis, OSTEOPOROSIS, and Visual impairment.    She has no past medical history of Anesthesia complication, Diabetes (HCC), Difficult intubation, Family history of malignant hyperthermia, Family history of pseudocholinesterase deficiency, History of adverse reaction to anesthesia, motion sickness, Malignant hyperthermia, PONV (postoperative nausea and vomiting), or Pseudocholinesterase deficiency.   Pt denies diplopia, dysarthria,  dysphasia, dizziness, drop attacks, bowel/bladder changes, saddle anesthesia, and BRAD LE N/T.    ASSESSMENT  Afroditi presents to physical therapy evaluation with primary c/o constant back pain radiating to (L)LE all the way to her toes and chronic (L) knee pain with apparently failed TKR. The results of the objective tests and measures show symptoms and findings consistent with impaired LE function due to constant severe pain limiting tolerance to loading/load.  Functional deficits include but are not limited to declining tolerance to performing household chores such as cleaning.  Signs and symptoms are consistent with medical diagnoses as stated above. Pt and PT discussed evaluation findings, pathology, POC and HEP.  Pt voiced understanding and performs HEP correctly without reported pain. Skilled Physical Therapy is medically necessary to address the above impairments and reach functional goals.     Precautions:  Fall Risk  OBJECTIVE:   Observation/Posture: flexed posture; frequent involuntary (L)LE movements resulting in LOB requiring UE support to recover; muscle atrophy noted to (L)LE compared to (R)  Neuro Screen: unable to ellicit (L) knee and ankle jerk; hypersthesia to (L)LE    Lumbar AROM: (* denotes performed with pain)  Flexion: mod loss  Extension: severe loss  Sidebending: R mod to severe loss; L mod to severe loss  Rotation: R mod to severe loss; L mod to severe loss    Accessory motion: unable to assess  Palpation: severe tenderness around (L) knee and hypersthesia to light touch below knee on (L)    Strength: (* denotes performed with pain)  (R)LE grossly 4 to 4+/5  (L)LE grossly 3- to 3/5    Flexibility:   Mod to severe loss to (L) hip flexors, hamstrings and quads  Mod loss to (B) gastrocsoleus    Special tests:       Gait: pt ambulates on level ground with trendelenburg/waddle; mildly ataxic with sudden involuntary (L)LE movements noted resulting in LOB.  Balance: SLS R unable, L  unable    Today’s Treatment and Response:   Pt education was provided on exam findings, treatment diagnosis, treatment plan, expectations, and prognosis. Pt was also provided recommendations for activity modifications, possible soreness after evaluation, postural corrections, ergonomics, pain science education , detrimental fear avoidance behaviors, importance of remaining active, strategies to reduce fall risk at home, and use of more supportive assistive device to reduce fall risk      EVALUATION ONLY WITH UPDATED PT PRESCRIPTION PENDING    Charges: PT Eval Low Complexity, 40 mins      Total Timed Treatment: 0 mins     Total Treatment Time: 40 mins     Based on clinical rationale and outcome measures, this evaluation involved Low Complexity decision making due to 3+ personal factors/comorbidities, 4+ body structures involved/activity limitations, and unstable symptoms including  high fall risk and decreased (L)LE neuromuscular control .  PLAN OF CARE:    Goals: (to be met in 8-10 visits)   Afroditi will begin utilizing a rolling walker with good safety awareness/technique and prevent episodes of falls.  Afroditi will have improved (L)LE strength to 3+/5 to prevent (L) knee buckling during WB and allow tolerance to standing up to 15 mins at a time.  Afroditi will negotiate uneven surfaces such as curbs and stairs with good technique and safety awareness to restore modified (I) community mobility  Afroditi will be (I) with a home program to allow discharge from PT towards further self-recovery and maintenance of function.    Frequency / Duration: Patient will be seen for 1-2 x/week or a total of 8-10 visits over a 90 day period. Treatment will include: Gait training, Manual Therapy, Neuromuscular Re-education, Self-Care Home Management, Therapeutic Activities, Therapeutic Exercise, and Home Exercise Program instruction    Education or treatment limitation: Compliance; questionable receptiveness to recommendations  for more supportive assistive device indicated by high fall risk predicted by Wagner test  Rehab Potential:fair    FES Score  No data recorded  Patient declined participation.    Fidelia Villar was advised of these findings, precautions, and treatment options and has agreed to actively participate in planning and for this course of care.    Thank you for your referral. Please co-sign or sign and return this letter via fax as soon as possible to [phone]. If you have any questions, please contact me at Dept: [phone]    Sincerely,  Electronically signed by therapist: Fermín Nash, PT    Physician's certification required: Yes  I certify the need for these services furnished under this plan of treatment and while under my care.    X___________________________________________________ Date____________________    Certification From: 2/21/2024  To:5/21/2024

## 2024-03-04 ENCOUNTER — OFFICE VISIT (OUTPATIENT)
Dept: INTERNAL MEDICINE CLINIC | Facility: CLINIC | Age: 79
End: 2024-03-04

## 2024-03-04 ENCOUNTER — TELEPHONE (OUTPATIENT)
Dept: INTERNAL MEDICINE CLINIC | Facility: CLINIC | Age: 79
End: 2024-03-04

## 2024-03-04 VITALS
RESPIRATION RATE: 17 BRPM | BODY MASS INDEX: 40.64 KG/M2 | SYSTOLIC BLOOD PRESSURE: 102 MMHG | HEART RATE: 76 BPM | HEIGHT: 60 IN | WEIGHT: 207 LBS | OXYGEN SATURATION: 96 % | DIASTOLIC BLOOD PRESSURE: 90 MMHG

## 2024-03-04 DIAGNOSIS — M25.511 CHRONIC RIGHT SHOULDER PAIN: ICD-10-CM

## 2024-03-04 DIAGNOSIS — G89.29 CHRONIC RIGHT SHOULDER PAIN: ICD-10-CM

## 2024-03-04 DIAGNOSIS — N64.4 BREAST PAIN, RIGHT: ICD-10-CM

## 2024-03-04 DIAGNOSIS — M54.2 NECK PAIN: Primary | ICD-10-CM

## 2024-03-04 PROBLEM — J41.0 SMOKERS' COUGH (HCC): Chronic | Status: ACTIVE | Noted: 2024-03-04

## 2024-03-04 PROBLEM — E66.01 SEVERE OBESITY (BMI >= 40) (HCC): Chronic | Status: ACTIVE | Noted: 2024-03-04

## 2024-03-04 PROBLEM — N18.30 CKD (CHRONIC KIDNEY DISEASE) STAGE 3, GFR 30-59 ML/MIN (HCC): Chronic | Status: ACTIVE | Noted: 2024-03-04

## 2024-03-04 PROCEDURE — 1159F MED LIST DOCD IN RCRD: CPT | Performed by: INTERNAL MEDICINE

## 2024-03-04 PROCEDURE — 3008F BODY MASS INDEX DOCD: CPT | Performed by: INTERNAL MEDICINE

## 2024-03-04 PROCEDURE — 99214 OFFICE O/P EST MOD 30 MIN: CPT | Performed by: INTERNAL MEDICINE

## 2024-03-04 PROCEDURE — 3074F SYST BP LT 130 MM HG: CPT | Performed by: INTERNAL MEDICINE

## 2024-03-04 PROCEDURE — 3080F DIAST BP >= 90 MM HG: CPT | Performed by: INTERNAL MEDICINE

## 2024-03-04 NOTE — TELEPHONE ENCOUNTER
Marcelino, this patient is in, having pain in the right shoulder neck, right breast as well, I do see a Tylenol 3 ordered from you in the past, she has got 2 pills left, I would defer to you to how we handle some of this, you probably have to see her again in the office in the near future.  Thank for the help.

## 2024-03-04 NOTE — PATIENT INSTRUCTIONS
1. Neck pain  Lets do the x-rays, and reach out to Dr. Chong, I will message him as well, I think you continue to use the pain control Tylenol 3 as you have been, but when it comes to expanding this I will defer to the physiatrist  Lets set up new appointments with the physical therapist, and bring my new paperwork in with them for the next visit you have  I am not sure whether you are shoulder pain and trapezius muscle pain is related to the neck, lets get some x-rays use a specialist and go from there  - XR CERVICAL SPINE (2-3 VIEWS) (CPT=72040); Future  - Physiatry Referral - In Network  - Physical Therapy Referral - [place] Locations    2. Chronic right shoulder pain  As above  - XR CERVICAL SPINE (2-3 VIEWS) (CPT=72040); Future  - Physiatry Referral - In Network  - Physical Therapy Referral - [place] Locations    3. Breast pain, right  Lets get the breast imaged as well, mammogram with reflexive ultrasound if needed, lets call and set this up I did place orders.  - [facility] MAYA 2D+3D SCREENING BILAT (CPT=77067/25854); Future

## 2024-03-05 ENCOUNTER — TELEPHONE (OUTPATIENT)
Dept: INTERNAL MEDICINE CLINIC | Facility: CLINIC | Age: 79
End: 2024-03-05

## 2024-03-05 DIAGNOSIS — Z12.31 ENCOUNTER FOR SCREENING MAMMOGRAM FOR MALIGNANT NEOPLASM OF BREAST: ICD-10-CM

## 2024-03-05 DIAGNOSIS — N64.4 BREAST PAIN, RIGHT: Primary | ICD-10-CM

## 2024-03-05 NOTE — PROGRESS NOTES
HPI:   Fidleia Villar is a 78 year old female who presents for complains of:   Chief Complaint   Patient presents with    Arm Pain     RT    Neck Pain    Breast Pain     B/L    Knee Pain     LT   Right arm and neck pain: Patient has a recent history of right arm and neck pain, which goes down her arm, it is difficult for her to use the arm at times, she is noticing some  strength difference, discharge shock and shooting type pain that has bothered her now for a few months, and is getting worse over the past few weeks.  She is here with her  today, she claims her neck has not been bothering her, but she has reproducible pain in that area, especially with slumping down and moving.  Breast pain: Patient also claims to feel some breast pain on the right side when palpating the outside of her breast, she is also able to side bend right, and reproduce some pain in the ribs as well, she is not up-to-date with mammograms, and is concerned about the finding.  No fever or chills, no night sweats, no weight loss.    EXAM:   /90   Pulse 76   Resp 17   Ht 5' (1.524 m)   Wt 207 lb (93.9 kg)   SpO2 96%   BMI 40.43 kg/m²   Body mass index is 40.43 kg/m².   Gen. exam: Alert and oriented, in no acute distress   HEENT: Pupils equal and reactive to light and accommodation, moist mucous membranes  Neck exam:  Supple.  Normal thyroid trachea midline, no JVD  Heart exam: Regular rate and rhythm no murmurs no S3 no S4   Lung exam: No rales no rhonchi no wheezes breast exam: With  Abdominal exam: Soft, nontender, nondistended, positive bowel sounds are normoactive  Extremities exam: no clubbing, no cyanosis, no edema  Skin exam: No obvious wounds, no rashes  Neurological exam: Cranial nerves II through XII intact, no gross deficits  Musculoskeletal exam: Right bilateral generalized hand arthritis appreciated, no obvious deformity, advanced kyphoscoliosis.  Restricted range of motion of the cervical spine in all  directions, pain with sidebending right  Some pain to palpation in the right breast in the 7:00 area, with a 2 x 4 cm elliptical mass in that area that does elicit pain.  No axillary lymphadenopathy, no findings left breast.    ASSESSMENT AND PLAN:   Fidelia Villar is a 78 year old female who presents with the followin. Neck pain  Lets do the x-rays, and reach out to Dr. Chong, I will message him as well, I think you continue to use the pain control Tylenol 3 as you have been, but when it comes to expanding this I will defer to the physiatrist  Lets set up new appointments with the physical therapist, and bring my new paperwork in with them for the next visit you have  I am not sure whether you are shoulder pain and trapezius muscle pain is related to the neck, lets get some x-rays use a specialist and go from there  - XR CERVICAL SPINE (2-3 VIEWS) (CPT=72040); Future  - Physiatry Referral - In Network  - Physical Therapy Referral - [place] Locations    2. Chronic right shoulder pain  As above  - XR CERVICAL SPINE (2-3 VIEWS) (CPT=72040); Future  - Physiatry Referral - In Network  - Physical Therapy Referral - [place] Locations    3. Breast pain, right  Lets get the breast imaged as well, mammogram with reflexive ultrasound if needed, lets call and set this up I did place orders.  - [facility] MAYA 2D+3D SCREENING BILAT (CPT=77067/93597); Future      Spent 30 minutes obtaining history, evaluating patient, discussing treatment options, diet, exercise, review of available labs and radiology reports, and completing documentation.    Jeff Anthony D'Amico, DO  3/5/2024  10:30 AM

## 2024-03-05 NOTE — TELEPHONE ENCOUNTER
Radha/Central Scheduling called regarding mammogram order  Because of breast pain order would need to be written for diagnostic mammogram  Please call with any questions  Tasked to nursing

## 2024-03-06 ENCOUNTER — OFFICE VISIT (OUTPATIENT)
Dept: PHYSICAL THERAPY | Age: 79
End: 2024-03-06
Attending: PHYSICAL MEDICINE & REHABILITATION
Payer: MEDICARE

## 2024-03-06 DIAGNOSIS — M54.16 LUMBAR RADICULOPATHY: ICD-10-CM

## 2024-03-06 PROCEDURE — 97112 NEUROMUSCULAR REEDUCATION: CPT | Performed by: PHYSICAL THERAPIST

## 2024-03-06 PROCEDURE — 97110 THERAPEUTIC EXERCISES: CPT | Performed by: PHYSICAL THERAPIST

## 2024-03-06 RX ORDER — ACETAMINOPHEN AND CODEINE PHOSPHATE 300; 30 MG/1; MG/1
1 TABLET ORAL EVERY 6 HOURS PRN
Qty: 30 TABLET | Refills: 0 | Status: SHIPPED | OUTPATIENT
Start: 2024-03-06

## 2024-03-06 NOTE — TELEPHONE ENCOUNTER
S/W patient to schedule appointment with Dr. Chong and process refill request for Tylenol # 3    Patient was scheduled for Monday 4/08/2024 at 2:30 pm for follow up with Dr. Chong.    See Refill encounter created for refill of Tylenol # 3.    PLEASE NOTE:  Patient has new preferred pharmacy- [facility]

## 2024-03-06 NOTE — TELEPHONE ENCOUNTER
Thank you.  I will need to see her again.  I will have my office staff contact her.  Thank you for letting me know.

## 2024-03-06 NOTE — PROGRESS NOTES
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 Arlette Blood MD visit: none scheduled  Fall Risk: standard         Precautions: n/a           Medication changes per patient? NO  Date of evaluation/PN:2024  Pain ratin  Subjective: Reports continuing to have constant severe pain in her back radiating to (B)LE (L) worse than (R)    Objective: arrived using a large base quad cane but still noted with frequent loss of balance with episodes of involuntary (R)LE movements.      Assessment: Improved gait stability using LBQC but still presenting with high fall risk.  Treatment today include Neuromuscular Re-education and Therapeutic Exercise focusing on postural correction and balance and noted improvement in single leg stance to 3-5 secs at a time post session. Residual deficits continue to be noted in rounded posture resulting in tissue overload and will be addressed with continued skilled interventions.      Goals: In progress as follows:  Afroditi will begin utilizing a rolling walker with good safety awareness/technique and prevent episodes of falls.  Afroditi will have improved (L)LE strength to 3+/5 to prevent (L) knee buckling during WB and allow tolerance to standing up to 15 mins at a time.  Afroditi will negotiate uneven surfaces such as curbs and stairs with good technique and safety awareness to restore modified (I) community mobility  Afroditi will be (I) with a home program to allow discharge from PT towards further self-recovery and maintenance of function.    Plan: Check response to updated HEP.   Date: 3/6/2024  TX#:  2/8-10 Date:                 TX#: 3/ Date:                 TX#: 4/ Date:                 TX#: 5/ Date:   Tx#: 6/ Date:   Tx#: 7/ Date:   Tx#: 8/ Date:   Tx#: 9/ Date:   Tx#: 10/ Date:   Tx#: 11/ Date:   Tx#: 12/ Date:   Tx#: 13/ Date:   Tx#: 14/ Date:   Tx#: 15/ Date:   Tx#: 16/   THERAPEUTIC EX 35 mins                 Slantboard stretch (B) L4 3 x 1 min ea                 Nustep UE/LE L4 x 10 mins                 Cervical retraction in sitting  3x10                 Scapular retraction in sitting  3x10                 Seated thoracic ext 3x10                 Cervical SB stretch 3 x 15 secs ea                 (B) shoulder ER Yellow tband 3x10                                   NEUROMUSCULAR RE-EDUCATION 8 mins                 Tandem stance 10 x 10 attempts                 Standing marching 3x10                                   MANUAL TX                                    THERAPEUTIC ACTIVITY                                    HEP: Refer to patient instructions    Charges: Therapeutic exercise x 2; Neuromuscular re-education x 1       Total Timed Treatment: 43 min  Total Treatment Time: 43 min

## 2024-03-06 NOTE — TELEPHONE ENCOUNTER
Refill Request    Medication request: ACETAMINOPHEN-CODEINE 300-30 MG Oral Tab TAKE 1 TABLET BY MOUTH EVERY 6 HOURS AS NEEDED FOR PAIN     LOV:8/30/2023 Marcelino Chong MD   Due back to clinic per last office note:  \" patient will follow up in 2-3 months, \"  NOV: 4/8/2024 Marcelino Chong MD      [place]: 2/02/2024 #28    Urine drug screen (if applicable): None  Pain contract: None    LOV plan (if weaning or changing medications): \"She will continue with th Cymbalta and the Tylenol #3 for the pain and the gabapentin. \"    NOTE:  Patient has been scheduled for follow up

## 2024-03-13 ENCOUNTER — APPOINTMENT (OUTPATIENT)
Dept: PHYSICAL THERAPY | Age: 79
End: 2024-03-13
Attending: PHYSICAL MEDICINE & REHABILITATION
Payer: MEDICARE

## 2024-03-14 DIAGNOSIS — Z86.16 HISTORY OF COVID-19: ICD-10-CM

## 2024-03-14 NOTE — TELEPHONE ENCOUNTER
[facility] [place] called asking for a refill on Temazepam for this patient. They could not send the request electronically since patient changed pharmacies.     Refill Request    Medication request: TEMAZEPAM 7.5 MG Oral Cap. TAKE 1 CAPSULE BY MOUTH NIGHTLY AS NEEDED FOR SLEEP      LOV:8/30/2023 Marcelino Chong MD   Due back to clinic per last office note:  The patient will follow up in 2-3 months, but the patient will call me 2 weeks after having the injection to let me know how the injection worked   NOV: 4/8/2024 Marcelino Chong MD      [place]: 2/4/2024 #30 (30 days)    Urine drug screen (if applicable): N/A  Pain contract: N/A    LOV plan (if weaning or changing medications): no changes mentioned

## 2024-03-15 RX ORDER — TEMAZEPAM 7.5 MG/1
7.5 CAPSULE ORAL NIGHTLY PRN
Qty: 30 CAPSULE | Refills: 0 | Status: SHIPPED | OUTPATIENT
Start: 2024-03-15

## 2024-03-18 ENCOUNTER — TELEPHONE (OUTPATIENT)
Dept: PHYSICAL THERAPY | Facility: HOSPITAL | Age: 79
End: 2024-03-18

## 2024-03-20 ENCOUNTER — HOSPITAL ENCOUNTER (OUTPATIENT)
Dept: MAMMOGRAPHY | Facility: HOSPITAL | Age: 79
Discharge: HOME | End: 2024-03-20
Attending: INTERNAL MEDICINE
Payer: MEDICARE

## 2024-03-20 ENCOUNTER — APPOINTMENT (OUTPATIENT)
Dept: PHYSICAL THERAPY | Age: 79
End: 2024-03-20
Attending: PHYSICAL MEDICINE & REHABILITATION
Payer: MEDICARE

## 2024-03-20 ENCOUNTER — HOSPITAL ENCOUNTER (OUTPATIENT)
Dept: ULTRASOUND IMAGING | Facility: HOSPITAL | Age: 79
Discharge: HOME | End: 2024-03-20
Attending: INTERNAL MEDICINE
Payer: MEDICARE

## 2024-03-20 DIAGNOSIS — N64.4 BREAST PAIN, RIGHT: ICD-10-CM

## 2024-03-20 PROCEDURE — 77062 BREAST TOMOSYNTHESIS BI: CPT | Performed by: INTERNAL MEDICINE

## 2024-03-20 PROCEDURE — 77066 DX MAMMO INCL CAD BI: CPT | Performed by: INTERNAL MEDICINE

## 2024-03-20 PROCEDURE — 76642 ULTRASOUND BREAST LIMITED: CPT | Performed by: INTERNAL MEDICINE

## 2024-03-26 DIAGNOSIS — I10 BENIGN HYPERTENSION: ICD-10-CM

## 2024-03-26 RX ORDER — METOPROLOL SUCCINATE 25 MG/1
25 TABLET, EXTENDED RELEASE ORAL DAILY
Qty: 90 TABLET | Refills: 1 | Status: CANCELLED | OUTPATIENT
Start: 2024-03-26

## 2024-03-26 NOTE — TELEPHONE ENCOUNTER
[facility], [place] called on behalf of patient  Requesting NEW for:  Metoprolol  Patient would like to transfer from [facility], no refills available to transfer  Tasked to RX

## 2024-03-27 ENCOUNTER — OFFICE VISIT (OUTPATIENT)
Dept: PHYSICAL THERAPY | Age: 79
End: 2024-03-27
Attending: PHYSICAL MEDICINE & REHABILITATION
Payer: MEDICARE

## 2024-03-27 PROCEDURE — 97110 THERAPEUTIC EXERCISES: CPT | Performed by: PHYSICAL THERAPIST

## 2024-03-27 PROCEDURE — 97112 NEUROMUSCULAR REEDUCATION: CPT | Performed by: PHYSICAL THERAPIST

## 2024-03-27 NOTE — PROGRESS NOTES
Progress Summary  Pt has attended 3 visits in Physical Therapy.   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 Arlette Blood MD visit: none scheduled  Fall Risk: standard         Precautions: n/a           Medication changes per patient? NO  Date of evaluation/PN:2024  Pain ratin  Subjective: Continues to report episodes of severe involuntary (L)LE movements causing her to lose her balance and even at night when she is trying to sleep.    Objective: arrived using a rolling walker with significant improvement in gait stability with (B)UE support; noted with 2 episodes of involuntary (L)LE ballistic movements requiring UE support due to loss of balance      Assessment: Random episodes of involuntary (L)LE ballistic movements resulting in significant loss of balance and risk for falls requiring (B)UE support for safety.  Treatment today include Neuromuscular Re-education and Therapeutic Exercise focusing on functional endurance to sustained physical activity, balance and safety awareness and noted improvement in ability to regain balance using (B)UE support post session. Residual deficits continue to be noted in impaired balance and proprioception compromising safety with community mobility and rounded posture resulting in tissue overload and will be addressed with continued skilled interventions.      Goals: In progress as follows:  Afroditi will begin utilizing a rolling walker with good safety awareness/technique and prevent episodes of falls.  Afroditi will have improved (L)LE strength to 3+/5 to prevent (L) knee buckling  during WB and allow tolerance to standing up to 15 mins at a time.  Afroditi will negotiate uneven surfaces such as curbs and stairs with good technique and safety awareness to restore modified (I) community mobility  Afroditi will be (I) with a home program to allow discharge from PT towards further self-recovery and maintenance of function.    Treatment:   Date: 3/6/2024  TX#: 2/8-10 Date: 3/27/2024            TX#: 3/8-10 Date:                 TX#: 4/ Date:                 TX#: 5/ Date:   Tx#: 6/ Date:   Tx#: 7/ Date:   Tx#: 8/ Date:   Tx#: 9/ Date:   Tx#: 10/ Date:   Tx#: 11/ Date:   Tx#: 12/ Date:   Tx#: 13/ Date:   Tx#: 14/ Date:   Tx#: 15/ Date:   Tx#: 16/   THERAPEUTIC EX 35 mins 30 mins                Slantboard stretch (B) L4 3 x 1 min ea L4 (B) 3 x 1 min ea                Nustep UE/LE L4 x 10 mins L5 x 12 mins                Cervical retraction in sitting  3x10                 Scapular retraction in sitting  3x10                 Seated thoracic ext 3x10                 Cervical SB stretch 3 x 15 secs ea                 (B) shoulder ER Yellow tband 3x10                 Step ups  Airex 3x10 ea                Lateral step ups  Airex 3x10                                  NEUROMUSCULAR RE-EDUCATION 8 mins 8 mins                Tandem stance 10 x 10 attempts                 Standing marching 3x10                 Rocker board  A-P and lateral wt shifts with focus on upright posture/balance 3x10 ea                Mini squats  Rocker board with A-P challenge 3x10                                  MANUAL TX                                    THERAPEUTIC ACTIVITY                                    HEP: Continue current HEP.    Charges: Therapeutic exercise x 2; Neuromuscular re-education x 1       Total Timed Treatment: 38 min  Total Treatment Time: 38 min  Post Oswestry Disability Index Score  No data recorded; declines participation       Plan: Continue skilled Physical Therapy 1-2 x/week or a total of 8-10 visits  over a 90 day period as established in her initial evaluation.        Fidelia was advised of these findings, precautions, and treatment options and has agreed to actively participate in planning and for this course of care.    Thank you for your referral. If you have any questions, please contact me at Dept: [phone].    Sincerely,  Electronically signed by therapist: Fermín Nash PT     Physician's certification required:  Yes  Please co-sign or sign and return this letter via fax as soon as possible to [phone].   I certify the need for these services furnished under this plan of treatment and while under my care.    X___________________________________________________ Date____________________    Certification From: 3/27/2024  To:6/25/2024

## 2024-03-29 DIAGNOSIS — N64.4 BREAST PAIN: Primary | ICD-10-CM

## 2024-03-29 DIAGNOSIS — N63.0 MASS OF BREAST, UNSPECIFIED LATERALITY: ICD-10-CM

## 2024-04-01 ENCOUNTER — TELEPHONE (OUTPATIENT)
Dept: INTERNAL MEDICINE CLINIC | Facility: CLINIC | Age: 79
End: 2024-04-01

## 2024-04-01 NOTE — TELEPHONE ENCOUNTER
Patient called and relayed Dr D'Amico's message. Patient states she will have her daughter in law call back to get information to better explain to her. Daughter in law to call back with verbal authorization given by patient is Marly Villar.

## 2024-04-01 NOTE — TELEPHONE ENCOUNTER
----- Message from Jeff Anthony D'Amico, DO sent at 3/29/2024  3:55 PM CDT -----  nursing call them, I left this message on [facility] [employer], try to reiterate-    Afroditi, I did see the results of the mammogram, they do not see anything too alarming on the area of question, but I know you have a palpable abnormality, and some pain, if it continues or does not let up, we would have to have a specialist here take a look, it is not a bad idea if your symptoms continue to think about seeing the surgery specialist to see if we need to take a closer look.  One of the surgeons names and numbers is below-dr damico Gresik, Christine, MD [address] [place] [address] [phone]

## 2024-04-03 ENCOUNTER — OFFICE VISIT (OUTPATIENT)
Dept: PHYSICAL THERAPY | Age: 79
End: 2024-04-03
Attending: PHYSICAL MEDICINE & REHABILITATION
Payer: MEDICARE

## 2024-04-03 PROCEDURE — 97112 NEUROMUSCULAR REEDUCATION: CPT | Performed by: PHYSICAL THERAPIST

## 2024-04-03 PROCEDURE — 97110 THERAPEUTIC EXERCISES: CPT | Performed by: PHYSICAL THERAPIST

## 2024-04-03 NOTE — PROGRESS NOTES
Progress Summary  Pt has attended 3 visits in Physical Therapy.   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 Arlette Blood MD visit: none scheduled  Fall Risk: standard         Precautions: n/a           Medication changes per patient? NO  Date of evaluation/PN:2024  Pain ratin  Subjective: Reports tripping over a floor mat inside an exterior door of her home and was laying on the floor for 4 hours until her  found her to help her up. Symptoms in her knee remain unchanged and remains insistent on using her cane at home and refusing to remove floor mats which will continue to pose a fall hazard..    Objective: Improved safety awareness noted in reaching for solid objects during transfers/transitional movements and arriving using her rolling walker. Use of cane at home along with other potential fall hazards such as floor mats will continue to contribute to likely episodes of falls as predicted by her low Wagner score.      Assessment: Poor choices in use of assistive device inconsistent with current balance and proprioceptive and strength deficits along with unsafe home environment such as floor mats continue to promote fall risk.  Treatment today include Neuromuscular Re-education and Therapeutic Exercise focusing on functional endurance to sustained physical activity, balance and safety awareness and noted improvement in ability to regain balance using (B)UE support post session. Residual deficits continue to be noted in impaired balance and proprioception along with poor safety awareness compromising safety with  community mobility and rounded posture resulting in tissue overload and will be addressed with continued skilled interventions.      Goals: In progress as follows:  Afroditi will begin utilizing a rolling walker with good safety awareness/technique and prevent episodes of falls.  Afroditi will have improved (L)LE strength to 3+/5 to prevent (L) knee buckling during WB and allow tolerance to standing up to 15 mins at a time.  Afroditi will negotiate uneven surfaces such as curbs and stairs with good technique and safety awareness to restore modified (I) community mobility  Afroditi will be (I) with a home program to allow discharge from PT towards further self-recovery and maintenance of function.    Treatment:   Date: 3/6/2024  TX#: 2/8-10 Date: 3/27/2024            TX#: 3/8-10 Date: 4/3/2024                TX#: 4/8-10 Date:                 TX#: 5/ Date:   Tx#: 6/ Date:   Tx#: 7/ Date:   Tx#: 8/ Date:   Tx#: 9/ Date:   Tx#: 10/ Date:   Tx#: 11/ Date:   Tx#: 12/ Date:   Tx#: 13/ Date:   Tx#: 14/ Date:   Tx#: 15/ Date:   Tx#: 16/   THERAPEUTIC EX 35 mins 30 mins 30 mins               Slantboard stretch (B) L4 3 x 1 min ea L4 (B) 3 x 1 min ea L4 (B) 3 x 1 min ea               Nustep UE/LE L4 x 10 mins L5 x 12 mins L5-6 x 15 mins               Cervical retraction in sitting  3x10                 Scapular retraction in sitting  3x10                 Seated thoracic ext 3x10                 Cervical SB stretch 3 x 15 secs ea                 (B) shoulder ER Yellow tband 3x10                 Step ups  Airex 3x10 ea                Lateral step ups  Airex 3x10                Seated marching   3x10               Seated LAQ   3x10               Seated isometric hip add   Yoga ball 3x10               Seated TA ring press   3x10                                 NEUROMUSCULAR RE-EDUCATION 8 mins 8 mins 8 mins               Tandem stance 10 x 10 attempts                 Standing marching 3x10                 Rocker board  A-P and  lateral wt shifts with focus on upright posture/balance 3x10 ea A-P and lateral wt shifts with focus on upright posture/balance 3x10 ea               Mini squats  Rocker board with A-P challenge 3x10 Rocker board with A-P challenge 3x10                                 MANUAL TX                                    THERAPEUTIC ACTIVITY                                    HEP: Continue current HEP.    Charges: Therapeutic exercise x 2; Neuromuscular re-education x 1       Total Timed Treatment: 38 min  Total Treatment Time: 38 min    Plan: Continue to progress balance and proprioceptive exercises along with education on safety awareness/fall prevention.

## 2024-04-03 NOTE — PATIENT INSTRUCTIONS
Preventing Falls at Home: Room by Room  Many falls happen at home, where we spend much of our time and tend to move around without thinking about our safety. There are many changes you can make to your home that will help prevent falls and better ensure your safety.  Floors, stairways, and hallways  Fall hazards in the home, including a scatter rug, dog, and empty slippers    Ensure there are handrails on both sides of any stairs, and make sure they are secure. Hold the handrails when you go up or down stairs, even when you are carrying something. Don't let anything you're carrying block your view of the steps.  Ensure there is good lighting with light switches at the top and bottom of stairs and on each end of a long trevino. Consider using motion-activated lights that plug into electrical outlets and automatically turn on when you walk by them to help illuminate stairwells and pathways.  Keep areas where you walk tidy. Don't leave books, papers, clothes, or shoes on the floor or stairs.  Check that all carpets are fixed firmly to the floor, so they won't slip. Put no-slip strips, which you can buy at any hardware store, on tile and wooden floors.  Don't use throw rugs or small area rugs.  Don't walk on slippery, newly washed floors.  Bathrooms  Mount grab bars near toilets and on both the inside and outside of your tub and shower.  Place nonskid mats, strips, or carpet on all surfaces that may get wet.  Remember to leave a light on in the bathroom at night or use a night light that turns on automatically in the dark.  Bedrooms  Put night lights and light switches close to your bed.  Keep a flashlight by your bed in case the power goes out and you need to get up.  Place a landline or well-charged phone near your bed.  Kitchen  Keep frequently used pots, pans, and kitchen utensils in a place where they are easy to reach.  Clean up spills immediately.  Prepare food while seated to prevent fatigue or loss of  balance.  Outdoor spaces  If you have steps leading to your front door, make sure they are not broken or uneven.  Add non?slip material to outdoor stairways.  Keep the lawn, deck, or porch areas clear of debris, such as fallen branches.  Consider installing a grab bar near the front door to provide balance while you are locking or unlocking the door.  Turn on your porch light at night and if you leave during the day but plan on returning home after dark.  In the winter, treat outdoor walkways with an ice melt product or sand to make them less slippery.  Other living areas  Keep electrical cords near walls and away from walking paths.  Arrange your furniture (especially low coffee tables) and other objects so they are not in your way when you walk.  Make sure your sofas and chairs are the right height for you to get in and out of easily.  Keep items you use often at waist level or within easy reach.  Don't stand on a chair or table to reach something that's too high -- use a “reach stick” instead or ask for help. Reach sticks are special grabbing tools that you can buy at many [employer] or medical-supply stores. If you use a step stool, make sure it's steady and has a handrail on top. Have someone stand next to you.  Don't let your cat or dog trip you. Know where your pet is whenever you're standing or walking.  Keep a list of emergency numbers in large print near each landline phone and save them under “favorites” on your mobile phone.  If you have fallen, your doctor might suggest that an occupational therapist, physical therapist, or nurse visit your home. These health care providers can assess your home's safety and advise you about making changes to lower your risk of falls.    Tools to get help  Six Tips To Help Prevent Falls infographic. Click link in caption to see full transcript.  Read and share this infographic and help spread the word about how to help prevent falls.  If you're concerned about falling, set  up systems to ensure you can get help if you fall. One option is installing an emergency response system. If you fall or need emergency help, you push a button on a special necklace or bracelet to alert 911. There is a fee for this service, and it's usually not covered by insurance.    Another option is to carry a well-charged cordless or mobile phone with you as you move throughout the house. Have close friends and family on speed dial. Consider setting up a smart home device (a small speaker that listens and responds to commands when you call its name) that can quickly connect you to contacts or emergency response teams. Some smartwatches can be set up to make emergency calls at the push of a button and others can even detect sudden fall-like movements and automatically call for help. Ask family and friends for help setting up these tools.

## 2024-04-05 ENCOUNTER — OFFICE VISIT (OUTPATIENT)
Dept: INTERNAL MEDICINE CLINIC | Facility: CLINIC | Age: 79
End: 2024-04-05

## 2024-04-05 ENCOUNTER — HOSPITAL ENCOUNTER (OUTPATIENT)
Dept: GENERAL RADIOLOGY | Facility: HOSPITAL | Age: 79
Discharge: HOME | End: 2024-04-05
Attending: INTERNAL MEDICINE
Payer: MEDICARE

## 2024-04-05 VITALS
HEIGHT: 60 IN | DIASTOLIC BLOOD PRESSURE: 84 MMHG | WEIGHT: 204 LBS | HEART RATE: 92 BPM | OXYGEN SATURATION: 93 % | BODY MASS INDEX: 40.05 KG/M2 | SYSTOLIC BLOOD PRESSURE: 122 MMHG | TEMPERATURE: 99 F

## 2024-04-05 DIAGNOSIS — N63.12 MASS OF UPPER INNER QUADRANT OF RIGHT BREAST: ICD-10-CM

## 2024-04-05 DIAGNOSIS — I10 BENIGN HYPERTENSION: ICD-10-CM
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
## 2024-04-05 DIAGNOSIS — S20.01XA CONTUSION OF RIGHT BREAST, INITIAL ENCOUNTER: ICD-10-CM

## 2024-04-05 DIAGNOSIS — M25.511 ACUTE PAIN OF RIGHT SHOULDER: Primary | ICD-10-CM

## 2024-04-05 DIAGNOSIS — M25.511 ACUTE PAIN OF RIGHT SHOULDER: ICD-10-CM

## 2024-04-05 PROCEDURE — 3008F BODY MASS INDEX DOCD: CPT | Performed by: INTERNAL MEDICINE

## 2024-04-05 PROCEDURE — 1159F MED LIST DOCD IN RCRD: CPT | Performed by: INTERNAL MEDICINE

## 2024-04-05 PROCEDURE — 73030 X-RAY EXAM OF SHOULDER: CPT | Performed by: INTERNAL MEDICINE

## 2024-04-05 PROCEDURE — 99214 OFFICE O/P EST MOD 30 MIN: CPT | Performed by: INTERNAL MEDICINE

## 2024-04-05 PROCEDURE — 1125F AMNT PAIN NOTED PAIN PRSNT: CPT | Performed by: INTERNAL MEDICINE

## 2024-04-05 PROCEDURE — 3079F DIAST BP 80-89 MM HG: CPT | Performed by: INTERNAL MEDICINE

## 2024-04-05 PROCEDURE — 3074F SYST BP LT 130 MM HG: CPT | Performed by: INTERNAL MEDICINE

## 2024-04-05 RX ORDER — METOPROLOL SUCCINATE 25 MG/1
25 TABLET, EXTENDED RELEASE ORAL DAILY
Qty: 90 TABLET | Refills: 1 | Status: SHIPPED | OUTPATIENT
Start: 2024-04-05

## 2024-04-05 NOTE — PATIENT INSTRUCTIONS
1. Benign hypertension  Lets stay with the same medications here, refilled  - metoprolol succinate ER 25 MG Oral Tablet 24 Hr; Take 1 tablet (25 mg total) by mouth daily.  Dispense: 90 tablet; Refill: 1    2. Acute pain of right shoulder  I do think you have a strain and some weakness of the rotator cuff, with your previous surgery here I do not think intervention with injection or surgery is the answer, you could always consider going back in with the surgeon orthopedic wise in the next few weeks if something is not getting better  For now I like you to start with some x-rays of the right shoulder, and physical therapy activities to try to strengthen what we have here, if therapy does not think you are making the progress we need they will have to communicate with me and we will discuss her next options  Voltaren gel  - XR SHOULDER, COMPLETE (MIN 2 VIEWS), RIGHT (CPT=73030); Future  - Physical Therapy Referral - External    3. Fall, initial encounter  I do like the idea of working on gait and balance as well with physical therapy, if you are going to use an [facility], lets make sure you transmit my orders to them.  - Physical Therapy Referral - External    4. Mass of upper inner quadrant of right breast  For this, I do like the idea of you considering management and monitoring with a breast surgeon, possibly some repeat imaging here, follow-up with me as well, consider make an appointment with the breast surgeon below    Saumya Zapata  [address]  [facility] [address] [phone]       5. Contusion of right breast, initial encounter  As above.

## 2024-04-05 NOTE — PROGRESS NOTES
HPI:   Fidelia Villar is a 78 year old female who presents for complains of:   Chief Complaint   Patient presents with    Breast Pain     Bilateral breast pain, right worse    Fall with shoulder pain: Patient claims she fell approximately 10 days ago while ambulating in the middle of the night, she fell onto her right side and chest, bruising the right shoulder, she has had a long history of right shoulder problems and pain, has had a surgery here in the past which she claims they put a screw and to tack down a ruptured tendon.  This was many years ago.  She has lost much mobility in the shoulder over the years, and this continues to worsen.  She is right-handed.  She did not feel a pop when she landed on the shoulder, was able to get to her feet, and subsequently saw bruising over the next few days, she is here today with her  for an evaluation.  Has seen physical therapy in the past, and recently as well, but mostly for gait and balance.  Not for the right shoulder.  She has taken some mild pain medications    Right breast mass: Patient does have a history of a right breast mass, evaluated by myself a few months ago, sent for imaging, recent mammogram with ultrasound does show no mass of concern, but for the clinical area of concern, they recommended close clinical follow-up.  I have recommended she follow-up with the breast surgeon for monitoring management, options.  She has not made an appointment yet.    EXAM:   /84 (BP Location: Right arm, Patient Position: Sitting, Cuff Size: large)   Pulse 92   Temp 98.7 °F (37.1 °C) (Oral)   Ht 5' (1.524 m)   Wt 204 lb (92.5 kg)   SpO2 93%   BMI 39.84 kg/m²   Body mass index is 39.84 kg/m².   Gen. exam: Alert and oriented, in no acute distress   HEENT: Pupils equal and reactive to light and accommodation, moist mucous membranes  Neck exam:  Supple.  Normal thyroid trachea midline, no JVD  Heart exam: Regular rate and rhythm no murmurs no S3 no S4   Lung  exam: No rales no rhonchi no wheezes  Abdominal exam: Soft, nontender, nondistended, positive bowel sounds are normoactive  Extremities exam: no clubbing, no cyanosis, no edema  Skin exam: No obvious wounds, no rashes  Neurological exam: Cranial nerves II through XII intact, no gross deficits  Musculoskeletal exam: no arthritis appreciated, no obvious deformity.  Right shoulder exam, with 50% mobility loss in the range of motion, especially with external rotation, forced internal rotation appears normal, but with mild pain on the anterior shoulder, palpation of the glenohumeral joint does not show dislocation, no crepitus, she does have pain positive scratch tests.  Difficulty with empty can test, and speeds test.  Breast exam: Patient does have a subcutaneous mass, right breast, approximately 12:00 from the areola.  It appears 4 to 5 cm elliptical shaped, nontender to palpation.  She also has very tender areas of the breast, with areas of ecchymosis but no notable hematoma, and the right upper chest, just below the areola, and to the medial side of the breast as well.  Areas are ecchymotic, with the start of mild yellowing, deep palpation avoided with the patient's apprehension and pain.  Nurse present for entire exam      ASSESSMENT AND PLAN:   Fidelia Villar is a 78 year old female who presents with the followin. Benign hypertension  Lets stay with the same medications here, refilled  - metoprolol succinate ER 25 MG Oral Tablet 24 Hr; Take 1 tablet (25 mg total) by mouth daily.  Dispense: 90 tablet; Refill: 1    2. Acute pain of right shoulder  I do think you have a strain and some weakness of the rotator cuff, with your previous surgery here I do not think intervention with injection or surgery is the answer, you could always consider going back in with the surgeon orthopedic wise in the next few weeks if something is not getting better  For now I like you to start with some x-rays of the right shoulder,  and physical therapy activities to try to strengthen what we have here, if therapy does not think you are making the progress we need they will have to communicate with me and we will discuss her next options  Voltaren gel  - XR SHOULDER, COMPLETE (MIN 2 VIEWS), RIGHT (CPT=73030); Future  - Physical Therapy Referral - External    3. Fall, initial encounter  I do like the idea of working on gait and balance as well with physical therapy, if you are going to use an [facility], lets make sure you transmit my orders to them.  - Physical Therapy Referral - External    4. Mass of upper inner quadrant of right breast  For this, I do like the idea of you considering management and monitoring with a breast surgeon, possibly some repeat imaging here, follow-up with me as well, consider make an appointment with the breast surgeon below    Saumya Zapata  [address] [facility] [address] [phone]       5. Contusion of right breast, initial encounter  As above.        Spent 30 minutes obtaining history, evaluating patient, discussing treatment options, diet, exercise, review of available labs and radiology reports, and completing documentation.    Jeff Anthony D'Amico,   4/5/2024  9:35 AM

## 2024-04-08 ENCOUNTER — TELEPHONE (OUTPATIENT)
Dept: PHYSICAL MEDICINE AND REHAB | Facility: CLINIC | Age: 79
End: 2024-04-08

## 2024-04-08 ENCOUNTER — OFFICE VISIT (OUTPATIENT)
Dept: PHYSICAL MEDICINE AND REHAB | Facility: CLINIC | Age: 79
End: 2024-04-08
Payer: MEDICARE

## 2024-04-08 VITALS
BODY MASS INDEX: 40.05 KG/M2 | SYSTOLIC BLOOD PRESSURE: 126 MMHG | HEIGHT: 60 IN | DIASTOLIC BLOOD PRESSURE: 80 MMHG | WEIGHT: 204 LBS

## 2024-04-08 DIAGNOSIS — S46.011S TRAUMATIC COMPLETE TEAR OF RIGHT ROTATOR CUFF, SEQUELA: ICD-10-CM

## 2024-04-08 DIAGNOSIS — M51.26 LUMBAR HERNIATED DISC: ICD-10-CM

## 2024-04-08 DIAGNOSIS — G62.9 PERIPHERAL POLYNEUROPATHY: ICD-10-CM

## 2024-04-08 DIAGNOSIS — M25.511 CHRONIC RIGHT SHOULDER PAIN: Primary | ICD-10-CM

## 2024-04-08 DIAGNOSIS — M54.16 LUMBAR RADICULOPATHY: ICD-10-CM

## 2024-04-08 DIAGNOSIS — M48.061 LUMBAR FORAMINAL STENOSIS: ICD-10-CM

## 2024-04-08 DIAGNOSIS — G89.29 CHRONIC LEFT-SIDED LOW BACK PAIN WITH LEFT-SIDED SCIATICA: ICD-10-CM

## 2024-04-08 DIAGNOSIS — G89.29 CHRONIC RIGHT SHOULDER PAIN: Primary | ICD-10-CM

## 2024-04-08 DIAGNOSIS — Z96.652 HISTORY OF LEFT KNEE REPLACEMENT: ICD-10-CM

## 2024-04-08 DIAGNOSIS — G60.8 SENSORY PERIPHERAL NEUROPATHY: ICD-10-CM

## 2024-04-08 DIAGNOSIS — M19.011 PRIMARY OSTEOARTHRITIS OF RIGHT SHOULDER: ICD-10-CM

## 2024-04-08 DIAGNOSIS — M54.42 CHRONIC LEFT-SIDED LOW BACK PAIN WITH LEFT-SIDED SCIATICA: ICD-10-CM

## 2024-04-08 DIAGNOSIS — M96.1 LUMBAR POST-LAMINECTOMY SYNDROME: ICD-10-CM

## 2024-04-08 DIAGNOSIS — M51.9 LUMBAR DISC DISEASE: ICD-10-CM

## 2024-04-08 DIAGNOSIS — M43.16 SPONDYLOLISTHESIS OF LUMBAR REGION: ICD-10-CM

## 2024-04-08 PROBLEM — S46.011A TRAUMATIC COMPLETE TEAR OF RIGHT ROTATOR CUFF: Status: ACTIVE | Noted: 2024-04-08

## 2024-04-08 RX ORDER — TRIAMCINOLONE ACETONIDE 40 MG/ML
60 INJECTION, SUSPENSION INTRA-ARTICULAR; INTRAMUSCULAR ONCE
Status: COMPLETED | OUTPATIENT
Start: 2024-04-08 | End: 2024-04-08

## 2024-04-08 RX ORDER — LIDOCAINE HYDROCHLORIDE 10 MG/ML
2.5 INJECTION, SOLUTION INFILTRATION; PERINEURAL ONCE
Status: COMPLETED | OUTPATIENT
Start: 2024-04-08 | End: 2024-04-08

## 2024-04-08 RX ADMIN — LIDOCAINE HYDROCHLORIDE 2.5 ML: 10 INJECTION, SOLUTION INFILTRATION; PERINEURAL at 16:00:00

## 2024-04-08 RX ADMIN — TRIAMCINOLONE ACETONIDE 60 MG: 40 INJECTION, SUSPENSION INTRA-ARTICULAR; INTRAMUSCULAR at 16:01:00

## 2024-04-08 NOTE — PROGRESS NOTES
Cervical [facility]    Chief Complaint:    Chief Complaint   Patient presents with    Low Back Pain     LOV: 8/30/23 Patient f/u post transforaminal epidural steroid injection. C/o left low back pain. Tylenol-3 and Temazepam.      Nursing note reviewed and verified.    Patient was last seen on 8/30/2023.  On 9/22/2023, I did left L4 and L5 TFESI's which helped her 100% for a few days.  Since then, she has had a fall in the bath tub and was found to have a mass on her breast.  She did not have any injuries with her fall except for right shoulder pain.  She bruised her right chest wall and breast with these falls.  She has had this x-rayed along with cervical spine x-rays.  She has not had any PT for the right shoulder pain.  She is going to [facility] and this has not been helping her.    She is having right shoulder pain that radiates down the right arm.  She has weakness in the bilateral arms and hands with the right one worse than the left one.  The shoulder pain is worse than the low back pain.       Past Medical History   Past Medical History:   Diagnosis Date    Cataract     Coronary atherosclerosis     High cholesterol     HYPERLIPIDEMIA     OSTEOARTHRITIS     knees R>L    Osteoarthritis     OSTEOPOROSIS     Visual impairment     glasses       Past Surgical History   Past Surgical History:   Procedure Laterality Date    APPENDECTOMY      CATARACT      bilat    CATH BARE METAL STENT (BMS)      CATH PERCUTANEOUS  TRANSLUMINAL CORONARY ANGIOPLASTY      COLONOSCOPY  2/09    adenomatous polyp--due in '12    OTHER SURGICAL HISTORY      CTS RELEASE BILAT,     OTHER SURGICAL HISTORY      R rotator repair x 2       Family History   Family History   Problem Relation Age of Onset    Heart Disease Father     Heart Disorder Father         CAD    Other (Other) Mother         DJD    Diabetes Brother     Breast Cancer Sister 73       Social History   Social History     Socioeconomic History    Marital  status:      Spouse name: Not on file    Number of children: Not on file    Years of education: Not on file    Highest education level: Not on file   Occupational History    Not on file   Tobacco Use    Smoking status: Some Days     Packs/day: 0.75     Years: 30.00     Additional pack years: 0.00     Total pack years: 22.50     Types: Cigarettes     Passive exposure: Current    Smokeless tobacco: Never   Vaping Use    Vaping Use: Never used   Substance and Sexual Activity    Alcohol use: No    Drug use: No    Sexual activity: Not on file   Other Topics Concern    Not on file   Social History Narrative    Not on file     Social Determinants of Health     Financial Resource Strain: Not on file   Food Insecurity: Not on file   Transportation Needs: Not on file   Physical Activity: Not on file   Stress: Not on file   Social Connections: Not on file   Housing Stability: Not on file       PE:  The patient does appear in her stated age in no distress.  The patient is well groomed.    Psychiatric:  The patient is alert and oriented x 3.  The patient has a normal affect and mood.      Respiratory:  No acute respiratory distress. Patient does not have a cough.    HEENT:  Extraocular muscles are intact. There is no kern icterus. Pupils are equal, round, and reactive to light. No redness or discharge bilaterally.    Skin:  There are no rashes or lesions. There are ecchymoses on the right chest wall and the right breast.    Lymph Nodes:  The patient has no palpable submandibular, supraclavicular, and cervical lymph nodes..    Vitals:  Vitals:    04/08/24 1419   BP: 126/80       Cervical Spine:    Posture: moderate-severe chin forward superiorly rotated protracted shoulder posture.   Shoulders: Level   Head: In neutral   Spinous Processes Palpations: Tender at C7   Z-Joints Palpations: Tender at  bilateral C5-6   Muscular Palpations: Tender at  bilateral upper trapezius  right pectoralis minor  right coracoid process      Vascular upper extremity:   Right radial pulses: 2+   Left radial pulses: 2+      Neurological Upper Extremity:    Light Touch: Intact in Bilateral UE except:  Increased in the right lateral forearm.   Pin Prick: Not tested.   UE Muscle Strength: All Upper Extremity strength measurements 5/5 on the left, but the right is diffusely 4-/5 with give way strength.   Reflexes: 2+ In the bilateral upper extremities.   Antony's sign Right: Negative   Antony's sigh Left: Negative     Shoulder: The shoulders are stable.    Medial Border Scapular Winging: absent   Right Scapula: laterally displaced   Left Scapula: laterally displaced   Palpation: Non-tender shoulder palpations.   Right Internal Rotation: moderately limited   Left Internal Rotation: normal   Right External Rotation: moderately limited   Left External Rotation: normal   Shoulder Special Tests: Right Vizcarra test: Positive  Left Vizcarra test: Negative  Right external rotation: Positive  Left external rotation: Negative     Radiology Imaging:  I reviewed with the patient her X-ray of the shoulder right from 4/5/2024.      Assessment  1. Chronic right shoulder pain & s/p right rotator cuff repair x2    2. Primary osteoarthritis of right shoulder: moderate glenohumeral and AC joint    3. L1-2 mild diffuse & bilateral mod far lat, L2-3 right mod far lat & mild diffuse, L3-4 mod-large diffuse & left large far lat, L4-5 mild diffuse & left mod far lat, L5-S1 lt mild galilea bulging discs    4. L4-5 bilateral mod-severe, L3-4 left mod-severe/right severe, L2-3 left mod/right mod-severe foraminal stenosis    5. L4-5 mild central herniated disc    6. left L4-5-S1 chronic and right S1 chronic radiculopathy    7. Peripheral polyneuropathy    8. Sensory peripheral neuropathy: axonal    9. Chronic left-sided low back pain with left-sided sciatica    10. 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    11. L5-S1 grade 1 unstable, L4-5 grade 1 stable spondylolisthesis     12. Lumbar post-laminectomy syndrome: L1-S1    13. Traumatic complete tear of right rotator cuff, sequela        Plan  I did a right shoulder injection in the office today under ultrasound guidance. (See procedure note)    She will notify the office in 2 weeks as to how her right shoulder is doing and if she is doing better, then she will need to get into the PT for the right shoulder.    If this does not help, then she will need to have the cervical spine x-rays and probably MRI scan.    She will follow up in 3 months or sooner if needed.    The patient understands and agrees with the stated plan.    Marcelino Chong MD  4/8/2024

## 2024-04-08 NOTE — TELEPHONE ENCOUNTER
Initiated authorization for Right shoulder injection under ultrasound guidance CPT/HCPCS 57096,  with Kain MONROY at [facility]  Case #[id number]  Status: Approved-authorization is not required per health plan however may be subject to review once claim is submitted    Inj done in office

## 2024-04-08 NOTE — PATIENT INSTRUCTIONS
Plan  I did a right shoulder injection in the office today under ultrasound guidance. (See procedure note)    She will notify the office in 2 weeks as to how her right shoulder is doing and if she is doing better, then she will need to get into the PT for the right shoulder.    If this does not help, then she will need to have the cervical spine x-rays and probably MRI scan.    She will follow up in 3 months or sooner if needed.

## 2024-04-08 NOTE — PROCEDURES
The patient is here for a right shoulder injection done under ultrasound guidance.    Under ultrasound guidance the right posterior glenohumeral joint was identified and a jhoana was placed on the patient's skin.The skin was cleaned with betadyne swabs x 3 and anesthetized with cold spray.  Then a 25 gauge needle was inserted under ultrasound guidance into the right posterior glenohumeral joint.  Aspiration was performed and no blood, fluid, or air was aspirated.  The patient was then injected with 4 ml of 1.5 ml of 40 mg of Kenalog/ml and 2.5 ml of 1% lidocaine without epinephrine.  The needle was removed and a band aid was applied.  The patient will follow up in 2 weeks with a message.    These images are permanently stored in the ultrasound unit or PACS system.  =oul

## 2024-04-10 ENCOUNTER — OFFICE VISIT (OUTPATIENT)
Dept: PHYSICAL THERAPY | Age: 79
End: 2024-04-10
Attending: PHYSICAL MEDICINE & REHABILITATION
Payer: MEDICARE

## 2024-04-10 PROCEDURE — 97112 NEUROMUSCULAR REEDUCATION: CPT | Performed by: PHYSICAL THERAPIST

## 2024-04-10 PROCEDURE — 97110 THERAPEUTIC EXERCISES: CPT | Performed by: PHYSICAL THERAPIST

## 2024-04-10 NOTE — PROGRESS NOTES
Discharge Summary  Pt has attended 5 visits in Physical Therapy.   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 Arlette Blood MD visit: none scheduled  Fall Risk: standard         Precautions: n/a           Medication changes per patient? NO  Date of evaluation/PN:2024  Pain ratin  Assessment:   Fidelia continues to have severe pain limiting her overall functional mobility at the community level and continues to be at high risk for falls with inconsistent use of the most appropriate assistive device to allow her better use of her Ue's during episodes of uncontrolled involuntary LE movements resulting in loss of balance and falls. She has limited response from PT due to her persistent symptoms and has requested discharge from PT at this time with her schedule getting too busy over the next few weeks.     Subjective: Continues to have difficulty with her balance due to random episodes of involuntary LE movements resulting in her knee buckling.    Objective: Continues to require cueing for proper use of RW using 2 hands and to consistently use walker for increased safety with all ambulation/mobility especially at community level.      Goals: NOT MET due to persistent symptoms as follows:  Fidelia will begin utilizing a rolling walker with good safety awareness/technique and prevent episodes of falls.  Fidelia will have improved (L)LE strength to 3+/5 to prevent (L) knee buckling during WB and allow tolerance to standing up to 15 mins at a time.  Avaiti will negotiate uneven surfaces such as curbs and stairs with good  technique and safety awareness to restore modified (I) community mobility  Afroditi will be (I) with a home program to allow discharge from PT towards further self-recovery and maintenance of function.    Treatment:   Date: 3/6/2024  TX#: 2/8-10 Date: 3/27/2024            TX#: 3/8-10 Date: 4/3/2024                TX#: 4/8-10 Date: 4/10/2024             TX#: 5/8-10 Date:   Tx#: 6/ Date:   Tx#: 7/ Date:   Tx#: 8/ Date:   Tx#: 9/ Date:   Tx#: 10/ Date:   Tx#: 11/ Date:   Tx#: 12/ Date:   Tx#: 13/ Date:   Tx#: 14/ Date:   Tx#: 15/ Date:   Tx#: 16/   THERAPEUTIC EX 35 mins 30 mins 30 mins 30 mins              Slantboard stretch (B) L4 3 x 1 min ea L4 (B) 3 x 1 min ea L4 (B) 3 x 1 min ea L4 (B) 3 x 1 min ea              Nustep UE/LE L4 x 10 mins L5 x 12 mins L5-6 x 15 mins L5 x 10 mins              Cervical retraction in sitting  3x10                 Scapular retraction in sitting  3x10                 Seated thoracic ext 3x10                 Cervical SB stretch 3 x 15 secs ea                 (B) shoulder ER Yellow tband 3x10                 Step ups  Airex 3x10 ea  Airex 3x10 ea              Lateral step ups  Airex 3x10  Airex 3x10              Seated marching   3x10               Seated LAQ   3x10               Seated isometric hip add   Yoga ball 3x10               Seated TA ring press   3x10               marching    Standing on airex 3x10              Hip abd    Standing on airex 3x10                                NEUROMUSCULAR RE-EDUCATION 8 mins 8 mins 8 mins 8 mins              Tandem stance 10 x 10 attempts                 Standing marching 3x10                 Rocker board  A-P and lateral wt shifts with focus on upright posture/balance 3x10 ea A-P and lateral wt shifts with focus on upright posture/balance 3x10 ea A-P and lateral wt shifts with focus on upright posture/balance 3x10 ea              Mini squats  Rocker board with A-P challenge 3x10 Rocker board with A-P challenge 3x10 Rocker board with A-P  challenge 3x10                                MANUAL TX                                    THERAPEUTIC ACTIVITY                                    HEP: Continue current HEP.    Charges: Therapeutic exercise x 2; Neuromuscular re-education x 1       Total Timed Treatment: 38 min  Total Treatment Time: 38 min    Post Oswestry Disability Index Score  No data recorded; patient declined participation.     Plan: Discharge per patient request.    Thank you for your referral. If you have any questions, please contact me at Dept: [phone].    Sincerely,  Electronically signed by therapist: Fermín Nash PT

## 2024-04-15 DIAGNOSIS — M54.16 LUMBAR RADICULOPATHY: ICD-10-CM

## 2024-04-15 DIAGNOSIS — Z86.16 HISTORY OF COVID-19: ICD-10-CM

## 2024-04-15 NOTE — TELEPHONE ENCOUNTER
Refill Request    Medication request: acetaminophen-codeine 300-30 MG Oral Tab Take 1 tablet by mouth every 6 (six) hours as needed for Pain.     LOV:4/8/2024 Marcelino Chong MD   Due back to clinic per last office note:  \"follow up in 3 months \"  NOV: 7/15/2024 Marcelino Chong [place]      [place]: 2/2/24 #28 - 7 day supply    Urine drug screen (if applicable): none  Pain contract: none    LOV plan (if weaning or changing medications): No mention of medication at LOV.           Medication request: temazepam 7.5 MG Oral Cap Take 1 capsule (7.5 mg total) by mouth nightly as needed for Sleep.     [place] 2/4/24 #30 - 30 day supply

## 2024-04-16 RX ORDER — ACETAMINOPHEN AND CODEINE PHOSPHATE 300; 30 MG/1; MG/1
1 TABLET ORAL EVERY 6 HOURS PRN
Qty: 30 TABLET | Refills: 0 | Status: SHIPPED | OUTPATIENT
Start: 2024-04-16

## 2024-04-16 RX ORDER — TEMAZEPAM 7.5 MG/1
7.5 CAPSULE ORAL NIGHTLY PRN
Qty: 30 CAPSULE | Refills: 0 | Status: SHIPPED | OUTPATIENT
Start: 2024-04-16

## 2024-04-17 ENCOUNTER — APPOINTMENT (OUTPATIENT)
Dept: PHYSICAL THERAPY | Age: 79
End: 2024-04-17
Attending: PHYSICAL MEDICINE & REHABILITATION
Payer: MEDICARE

## 2024-04-19 NOTE — TELEPHONE ENCOUNTER
Called daughter per HIPAA and relayed  message - verbalized understanding and wrote down all information

## 2024-04-23 ENCOUNTER — TELEPHONE (OUTPATIENT)
Dept: PHYSICAL MEDICINE AND REHAB | Facility: CLINIC | Age: 79
End: 2024-04-23

## 2024-04-23 DIAGNOSIS — Z86.16 HISTORY OF COVID-19: ICD-10-CM

## 2024-04-23 NOTE — TELEPHONE ENCOUNTER
Refill Request    Medication request: temazepam 7.5 MG Oral Cap.  Take 1 capsule (7.5 mg total) by mouth nightly as needed for Sleep.      LOV:4/8/2024 Marcelino Chong MD   Due back to clinic per last office note:  follow up in 3 months  NOV: 7/15/2024 Marcelino Chong [place]      [place]: 3/15/2024 #30    Urine drug screen (if applicable): N/A  Pain contract: N/A      Medication refill signed on 4/16/2024. Prior authorization needed.     Prior auth [facility] at [facility] with Key: [id number]

## 2024-05-09 ENCOUNTER — MED REC SCAN ONLY (OUTPATIENT)
Dept: PHYSICAL MEDICINE AND REHAB | Facility: CLINIC | Age: 79
End: 2024-05-09

## 2024-05-20 DIAGNOSIS — M54.16 LUMBAR RADICULOPATHY: ICD-10-CM

## 2024-05-20 NOTE — TELEPHONE ENCOUNTER
Patient needs refill for:  Duloxetine 60MG, qty 90  Methocarbamol, Qty 90  Acetaminophen, Qty 90    Gabapentin, patient said she takes 4 per day/2AM and 2PM (Patient may need NEW RX based on this change), Qty 90    [facility]    Patient is out of medication    Tasked to RX

## 2024-05-21 ENCOUNTER — OFFICE VISIT (OUTPATIENT)
Dept: NEUROLOGY | Facility: CLINIC | Age: 79
End: 2024-05-21

## 2024-05-21 DIAGNOSIS — G25.3 MYOCLONUS: Primary | ICD-10-CM

## 2024-05-21 DIAGNOSIS — M54.17 LUMBOSACRAL RADICULOPATHY AT L5: ICD-10-CM

## 2024-05-21 DIAGNOSIS — G62.9 PERIPHERAL POLYNEUROPATHY: ICD-10-CM

## 2024-05-21 PROCEDURE — 99213 OFFICE O/P EST LOW 20 MIN: CPT | Performed by: OTHER

## 2024-05-21 PROCEDURE — 1159F MED LIST DOCD IN RCRD: CPT | Performed by: OTHER

## 2024-05-21 PROCEDURE — 1160F RVW MEDS BY RX/DR IN RCRD: CPT | Performed by: OTHER

## 2024-05-21 RX ORDER — GABAPENTIN 400 MG/1
800 CAPSULE ORAL 2 TIMES DAILY
Qty: 180 CAPSULE | Refills: 3 | Status: SHIPPED | OUTPATIENT
Start: 2024-05-21

## 2024-05-21 RX ORDER — DULOXETIN HYDROCHLORIDE 30 MG/1
30 CAPSULE, DELAYED RELEASE ORAL DAILY
Qty: 30 CAPSULE | Refills: 0 | Status: CANCELLED | OUTPATIENT
Start: 2024-05-21

## 2024-05-21 RX ORDER — DULOXETIN HYDROCHLORIDE 60 MG/1
60 CAPSULE, DELAYED RELEASE ORAL EVERY EVENING
Refills: 0 | Status: CANCELLED | OUTPATIENT
Start: 2024-05-21

## 2024-05-21 RX ORDER — ACETAMINOPHEN AND CODEINE PHOSPHATE 300; 30 MG/1; MG/1
1 TABLET ORAL EVERY 6 HOURS PRN
Qty: 30 TABLET | Refills: 0 | Status: CANCELLED | OUTPATIENT
Start: 2024-05-21

## 2024-05-21 RX ORDER — MAGNESIUM GLYCINATE 100 MG
400 TABLET ORAL NIGHTLY
Qty: 90 TABLET | Refills: 11 | Status: SHIPPED | OUTPATIENT
Start: 2024-05-21 | End: 2024-08-19

## 2024-05-21 RX ORDER — METHOCARBAMOL 750 MG/1
750 TABLET, FILM COATED ORAL 3 TIMES DAILY
Qty: 270 TABLET | Refills: 1 | Status: SHIPPED | OUTPATIENT
Start: 2024-05-21

## 2024-05-21 NOTE — TELEPHONE ENCOUNTER
Called patient to clarify prescriptions. Rn explained that she needs to call DR. Chong office in regards to Tylenol # 3 and Duloxetine - verbalized understanding  Refill request is for a maintenance medication and has met the criteria specified in the Ambulatory Medication Refill Standing Order for eligibility, visits, laboratory, alerts and was sent to the requested pharmacy.    Refill request is for a maintenance medication and has met the criteria specified in the Ambulatory Medication Refill Standing Order for eligibility, visits, laboratory, alerts and was sent to the requested pharmacy.    Requested Prescriptions     Signed Prescriptions Disp Refills    methocarbamol 750 MG Oral Tab 270 tablet 1     Sig: Take 1 tablet (750 mg total) by mouth 3 (three) times daily.     Authorizing Provider: D'AMICO, JEFF ANTHONY     Ordering User: CHARLENE KING    gabapentin 400 MG Oral Cap 180 capsule 3     Sig: Take 2 capsules (800 mg total) by mouth 2 (two) times daily.     Authorizing Provider: D'AMICO, JEFF ANTHONY Ordering User: CHARLENE KING

## 2024-05-21 NOTE — TELEPHONE ENCOUNTER
LMTCB - need clarification.     Cymbalta dose needs clarification - also intermittently prescribed by Dr. Chong.    Tylenol #3 is prescribed by Dr. Chong.

## 2024-05-21 NOTE — PROGRESS NOTES
[facility]  [address], [address]  [place] [id number] [phone]          Established  Follow Up Visit       Date: May 21, 2024  Patient Name: Fidelia Villar   MRN: [id number]  Primary physician: Kacey Montiel  [facility] [id number]    Interval History:   -- Patient follows up regarding peripheral myoclonus, neuropathy.  She saw physiatry April 2024 for right shoulder injection.  Per review of notes, she saw her primary care physician after a fall that occurred when she was walking in the middle of the night.  Fell onto her right side and chest and bruised her right shoulder.  She has had chronic mobility issues in the right shoulder from a ruptured tendon that was subsequently intervened on.    - She is currently presenting with left lower extremity pain, involuntary movements that wake her up from night right arm constant shooting pain that radiates towards her chest.    -- Still on Gabapentin 800 mg twice daily.      -- She says she goes to sleep and after 45 min-1.5 hours, her left lower extremity jerks, waking her up, she needs to get up and walk around, then sleeps again.  This occurs throughout the night.  She feels like it is a \"shot\"-like pain.  She only sleeps about 5 hours total.      -- She does not want to increase Gabapentin.      -- She goes to PT.      OUTPATIENT MEDICATIONS  Current Outpatient Medications on File Prior to Visit   Medication Sig Dispense Refill    acetaminophen-codeine 300-30 MG Oral Tab Take 1 tablet by mouth every 6 (six) hours as needed for Pain. 30 tablet 0    DULOXETINE 30 MG Oral Cap DR Particles Take 1 capsule by mouth once daily 30 capsule 0    gabapentin 400 MG Oral Cap Take 2 capsules (800 mg total) by mouth 2 (two) times daily. 180 capsule 3    amLODIPine 2.5 MG Oral Tab amLODIPine 2.5 mg tablet, [RxNorm: 614096]      alendronate 35 MG Oral Tab Take 1 tablet (35 mg total) by mouth once a week. 30 MINUTES BEFORE THE FIRST FOOD BEVERAGE OR  MEDICINE OF THE DAY WITH PLAIN WATER 13 tablet 3    methocarbamol 750 MG Oral Tab Take 1 tablet (750 mg total) by mouth 3 (three) times daily. 270 tablet 1    atorvastatin 40 MG Oral Tab Take 1 tablet (40 mg total) by mouth nightly. 90 tablet 3    Calcium Carb-Cholecalciferol (CALCIUM 600/VITAMIN D3) 600-20 MG-MCG Oral Tab Take by mouth 2 (two) times a day.      DULoxetine 60 MG Oral Cap DR Particles Take 1 capsule (60 mg total) by mouth every evening.      latanoprost 0.005 % Ophthalmic Solution Place 1 drop into both eyes daily.      aspirin 81 MG Oral Chew Tab Chew 4 tablets (324 mg total) by mouth daily.      acetaminophen 325 MG Oral Tab Take 500 mg by mouth 3 (three) times daily.      temazepam 7.5 MG Oral Cap Take 1 capsule (7.5 mg total) by mouth nightly as needed for Sleep. (Patient not taking: Reported on 5/21/2024) 30 capsule 0    metoprolol succinate ER 25 MG Oral Tablet 24 Hr Take 1 tablet (25 mg total) by mouth daily. 90 tablet 1    diclofenac 1 % External Gel Apply 2 g topically 4 (four) times daily as needed. (Patient not taking: Reported on 5/21/2024) 100 g 1     No current facility-administered medications on file prior to visit.         PHYSICAL EXAM:     There were no vitals taken for this visit.  General appearance: Well appearing, and in no acute distress  Skin: skin color, texture normal.  No rashes or lesions.    Head: Normocephalic, atraumatic.    Neurological exam:    Mental Status:   Attention/Concentration: intact attention on bedside test   Fund of knowledge: intact  Speech: no dysarthria or aphasia   LABS/DATA:  Component      Latest Ref Rn 9/29/2023   PROTEIN, TOTAL      6.4 - 8.2 g/dL 7.3    Albumin      3.75 - 5.21 g/dL 4.29    ALPHA-1-GLOBULINS      0.19 - 0.46 g/dL 0.25    ALPHA-2-GLOBULINS      0.48 - 1.05 g/dL 1.02    BETA GLOBULINS      0.68 - 1.23 g/dL 0.80    GAMMA GLOBULINS      0.62 - 1.70 g/dL 0.94    ALBUMIN/GLOBULIN RATIO      1.00 - 2.00  1.42    SPE INTERPRETATION  Serum protein electrophoresis shows no evidence of significant abnormalities.…    IMMUNOFIXATION Essentially normal.…    KAPPA FREE LIGHT CHAIN      0.330 - 1.940 mg/dL 1.349    LAMBDA FREE LIGHT CHAIN      0.571 - 2.630 mg/dL 0.949    KAPPA/LAMBDA FLC RATIO      0.26 - 1.65  1.42    Vit E Alpha [place]      9.0 - 29.0 mg/L 10.0    Vit E Gamma [place]      0.5 - 4.9 mg/L 1.5    COPPER      80 - 158 ug/dL 107    METHYLMALONIC ACID      0 - 378 nmol/L 253    VITAMIN B1 (THIAMINE), WHOLE B      66.5 - 200.0 nmol/L 136.5    VITAMIN B6      3.4 - 65.2 ug/L 11.6      Reviewed 12/18/2023 CBC, CMP, magnesium levels      IMAGING:  N/A    ASSESSMENT:  The patient is a 78 year old  woman with past medical history of sensory polyneuropathy, multilevel lumbar spondylosis with L4-S1 left-sided and right S1 radiculopathies, coronary artery disease, hyperlipidemia who presents for follow up regarding left leg paresthesias with involuntary movements (myoclonus).  These are likely due to to her L5 radiculopathy.  Her neurological examination is significant for left L5 radiculopathy and length-dependent peripheral neuropathy.    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  No weighted blanket use on chest or face to prevent breathing issues - discussed safety.   -B12 supplement - recommend injectable forms since levels are still low despite chronic oral supplementation   -Podiatry referral   -Recommended to increase evening dose of Gabapentin but she declined  -Other options: Keppra to help treat myoclonus    Follow up: 3 months     Discussed indication, administration, dose, and side effects  with patient of any medications personally prescribed. Patient was advised to let my office know if they have any questions or concerns.       Today, I personally spent 15 minutes in this case, including chart review, time spent with patient doing face to face evaluation w/ interview and exam and patient education, counseling, and time was spent in patient education, counseling, and coordination of care as described above.   Issues discussed: Diagnosis and implications on future health, benefits and side effects of present and future medications, test results as well as further testing and medications required.    This note was prepared using Dragon Medical voice recognition dictation software and as a result, errors may occur. When identified, these errors have been corrected. While every attempt is made to correct errors during dictation, discrepancies may still exist    ERIC Bui DO   Staff Physician, Neurology   5/21/2024  8:57 AM

## 2024-05-21 NOTE — PATIENT INSTRUCTIONS
Magnesium glycinate 400 mg at night   Massage your left leg at night and do stretching exercises  Weighted blanket on your left leg only

## 2024-05-28 RX ORDER — DULOXETIN HYDROCHLORIDE 60 MG/1
60 CAPSULE, DELAYED RELEASE ORAL EVERY EVENING
Refills: 0 | Status: CANCELLED | OUTPATIENT
Start: 2024-05-28

## 2024-05-28 NOTE — TELEPHONE ENCOUNTER
1 year was sent in Nov (also now marked as discontinued). However, it is now being prescribed again by Dr. Chong    Current refill request refused due to refill is either a duplicate request or has active refills at the pharmacy.  Check previous templates.    Requested Prescriptions     Pending Prescriptions Disp Refills    DULoxetine 60 MG Oral Cap DR Particles  0     Sig: Take 1 capsule (60 mg total) by mouth every evening.

## 2024-05-29 DIAGNOSIS — M54.16 LUMBAR RADICULOPATHY: ICD-10-CM

## 2024-05-29 NOTE — TELEPHONE ENCOUNTER
Refill Request    Medication request: acetaminophen-codeine 300-30 MG Oral Tab.  Take 1 tablet by mouth every 6 (six) hours as needed for Pain.      LOV:4/8/2024 Marcelino Chong MD   Due back to clinic per last office note:  She will follow up in 3 months or sooner if needed.   NOV: 7/15/2024 Marcelino Chong [place]      [place]: 4/16/2024 #30 (7 days)    Urine drug screen (if applicable): none  Pain contract: none    LOV plan (if weaning or changing medications): not mentioned

## 2024-05-30 RX ORDER — DULOXETIN HYDROCHLORIDE 30 MG/1
30 CAPSULE, DELAYED RELEASE ORAL DAILY
Qty: 30 CAPSULE | Refills: 0 | Status: SHIPPED | OUTPATIENT
Start: 2024-05-30 | End: 2024-05-30

## 2024-05-30 RX ORDER — DULOXETIN HYDROCHLORIDE 30 MG/1
30 CAPSULE, DELAYED RELEASE ORAL DAILY
Qty: 30 CAPSULE | Refills: 0 | Status: SHIPPED | OUTPATIENT
Start: 2024-05-30

## 2024-05-30 RX ORDER — DULOXETIN HYDROCHLORIDE 60 MG/1
60 CAPSULE, DELAYED RELEASE ORAL EVERY EVENING
Qty: 30 CAPSULE | Refills: 0 | Status: SHIPPED | OUTPATIENT
Start: 2024-05-30

## 2024-05-30 NOTE — TELEPHONE ENCOUNTER
Called for refill request-   DULOXETINE 30 MG Oral Cap DR Particles and  acetaminophen-codeine 300-30 MG Oral Tab- please call to advise.  
Refill Request    Medication request:   DULOXETINE 30 MG Oral Cap DR Particles       LOV: 4/8/2024 Marcelino Chong MD   RTC: 3 months  NOV: 7/15/2024 Marcelino Chong [place]      [place]: 5/14/2024 #30 days    UDS: (if applicable): N/A  Pain contract: N/A    LOV plan (if weaning or changing medications): She will continue with the Cymbalta and the Tylenol #3 for the pain and the gabapentin.     
extensive CAD sp multiple stents, last 8-2021 on DAPT and other meds. he has camacho at half a block at baseline.  struck in nose by accident monday 4pm and had epistaxis until 7 am tuesday am when he arrived at hospital in [place], treated with packing successfully, he was told his hb was 11. he has had intermittent cp since then treated with ntg.   last had 1 hour cp midnight to 1 am last night. currently pain free.  agree with prbc tx to hb>8.  recommend cardiology eval and admission given extensive and ongoing symptomaology due to cad.

## 2024-05-30 NOTE — TELEPHONE ENCOUNTER
RN phoned-in Tylenol #3 to [facility]'s pharmacy. MD's signature will still be needed. Pt contacted and made aware her prescription has been called in.

## 2024-06-03 ENCOUNTER — MED REC SCAN ONLY (OUTPATIENT)
Dept: PHYSICAL MEDICINE AND REHAB | Facility: CLINIC | Age: 79
End: 2024-06-03

## 2024-06-03 RX ORDER — ACETAMINOPHEN AND CODEINE PHOSPHATE 300; 30 MG/1; MG/1
1 TABLET ORAL EVERY 6 HOURS PRN
Qty: 30 TABLET | Refills: 0 | OUTPATIENT
Start: 2024-06-03

## 2024-06-04 ENCOUNTER — TELEPHONE (OUTPATIENT)
Dept: INTERNAL MEDICINE CLINIC | Facility: CLINIC | Age: 79
End: 2024-06-04

## 2024-06-04 ENCOUNTER — OFFICE VISIT (OUTPATIENT)
Dept: INTERNAL MEDICINE CLINIC | Facility: CLINIC | Age: 79
End: 2024-06-04

## 2024-06-04 ENCOUNTER — HOSPITAL ENCOUNTER (OUTPATIENT)
Dept: GENERAL RADIOLOGY | Age: 79
Discharge: HOME | End: 2024-06-04
Attending: INTERNAL MEDICINE
Payer: MEDICARE

## 2024-06-04 VITALS
TEMPERATURE: 98 F | SYSTOLIC BLOOD PRESSURE: 122 MMHG | WEIGHT: 208.19 LBS | HEIGHT: 60 IN | HEART RATE: 72 BPM | DIASTOLIC BLOOD PRESSURE: 68 MMHG | OXYGEN SATURATION: 92 % | BODY MASS INDEX: 40.87 KG/M2

## 2024-06-04 DIAGNOSIS — M54.2 NECK PAIN: ICD-10-CM

## 2024-06-04 DIAGNOSIS — M25.511 CHRONIC RIGHT SHOULDER PAIN: ICD-10-CM

## 2024-06-04 DIAGNOSIS — I10 BENIGN HYPERTENSION: ICD-10-CM

## 2024-06-04 DIAGNOSIS — G89.29 CHRONIC RIGHT SHOULDER PAIN: ICD-10-CM

## 2024-06-04 DIAGNOSIS — M54.12 CERVICAL RADICULITIS: Primary | ICD-10-CM

## 2024-06-04 DIAGNOSIS — G62.9 PERIPHERAL POLYNEUROPATHY: ICD-10-CM

## 2024-06-04 PROCEDURE — 1159F MED LIST DOCD IN RCRD: CPT | Performed by: INTERNAL MEDICINE

## 2024-06-04 PROCEDURE — 99214 OFFICE O/P EST MOD 30 MIN: CPT | Performed by: INTERNAL MEDICINE

## 2024-06-04 PROCEDURE — 72040 X-RAY EXAM NECK SPINE 2-3 VW: CPT | Performed by: INTERNAL MEDICINE

## 2024-06-04 PROCEDURE — 3074F SYST BP LT 130 MM HG: CPT | Performed by: INTERNAL MEDICINE

## 2024-06-04 PROCEDURE — 3008F BODY MASS INDEX DOCD: CPT | Performed by: INTERNAL MEDICINE

## 2024-06-04 PROCEDURE — 1125F AMNT PAIN NOTED PAIN PRSNT: CPT | Performed by: INTERNAL MEDICINE

## 2024-06-04 PROCEDURE — 3078F DIAST BP <80 MM HG: CPT | Performed by: INTERNAL MEDICINE

## 2024-06-04 RX ORDER — DULOXETIN HYDROCHLORIDE 30 MG/1
30 CAPSULE, DELAYED RELEASE ORAL DAILY
Qty: 30 CAPSULE | Refills: 5 | Status: SHIPPED | OUTPATIENT
Start: 2024-06-04

## 2024-06-04 RX ORDER — DULOXETIN HYDROCHLORIDE 60 MG/1
60 CAPSULE, DELAYED RELEASE ORAL EVERY EVENING
Qty: 30 CAPSULE | Refills: 5 | Status: SHIPPED | OUTPATIENT
Start: 2024-06-04

## 2024-06-04 NOTE — PATIENT INSTRUCTIONS
1. Cervical radiculitis  I am going to send a message to your specialist, I do think it is okay for you to take an extra half or full pill of the Tylenol 3 during the day at times if we needed, I also think we should be on a once a day dose of the duloxetine 90 mg, but we can stay with the split dosing for now I did refill that medication, there is some x-rays ready to do, lets stop at the [facility] on the way home to have this objective hide    2. Neck pain  We may eventually need a neck injection here, I will message this to your specialist  - DULoxetine 30 MG Oral Cap DR Particles; Take 1 capsule (30 mg total) by mouth daily.  Dispense: 30 capsule; Refill: 5  - DULoxetine 60 MG Oral Cap DR Particles; Take 1 capsule (60 mg total) by mouth every evening.  Dispense: 30 capsule; Refill: 5    3. Chronic right shoulder pain & s/p right rotator cuff repair x2  Stable, as above    4. Benign hypertension  Stable continue current monitoring management    5. Peripheral polyneuropathy  Continue current gabapentin dosing.      -For the history of falls, I will do like the idea of you getting a lifeline alert, and or possibly a phone so you have something to communicate if you do make it to the ground.

## 2024-06-04 NOTE — TELEPHONE ENCOUNTER
Rudi Benson, this patient sounds like she has a connection to your clinic with a family member, she continues to have right neck chest shoulder breast pain, I do think it has a radicular component.  I have ordered some x-rays to be completed, I have told her it is okay to take an extra half of Tylenol 3 during the day as well from the prescription you sent in yesterday.  You may have to get her in touch earlier than her scheduled appointment next month, and consider an intervention/another intervention on the cervical spine.  Thanks as always.

## 2024-06-04 NOTE — PROGRESS NOTES
HPI:   Fidelia Villar is a 78 year old female who presents for complains of:   Chief Complaint   Patient presents with    Follow - Up     3 month follow up appointment.    Pain     States history of  Rt shoulder pain which radiates to Rt chest area progressively getting worse over past few months   Patient continues to have pain, in the right shoulder arm, chest and breast area, she has seen her pain specialist Dr. holman, but continues to have pain, she has had another fall approximately a month ago, and was on the floor for approximately 4 hours before being discovered by her .  She has been participating with physical therapy, claims this is not helping much, she has had multiple injections in the periphery, but is never received from what she says is a central nerve injection in the cervical spine.  X-ray has not been completed as ordered at last visit as well    EXAM:   /68 (BP Location: Left arm, Patient Position: Sitting, Cuff Size: large)   Pulse 72   Temp 98.2 °F (36.8 °C) (Oral)   Ht 5' (1.524 m)   Wt 208 lb 3.2 oz (94.4 kg)   SpO2 92%   BMI 40.66 kg/m²   Body mass index is 40.66 kg/m².   Gen. exam: Alert and oriented, in no acute distress   HEENT: Pupils equal and reactive to light and accommodation, moist mucous membranes  Neck exam:  Supple.  Normal thyroid trachea midline, no JVD  Heart exam: Regular rate and rhythm no murmurs no S3 no S4   Lung exam: No rales no rhonchi no wheezes  Abdominal exam: Soft, nontender, nondistended, positive bowel sounds are normoactive, obese abdomen  Extremities exam: no clubbing, no cyanosis, no edema  Skin exam: No obvious wounds, no rashes  Neurological exam: Cranial nerves II through XII intact, no gross deficits  Musculoskeletal exam: Has bilateral generalized hand arthritis appreciated, no obvious deformity.  Neck exam, with extremely limited range of motion, in sidebending right, rotation left, extension, advanced kyphoscoliosis, inability to  fully lift the right arm past horizontal.    ASSESSMENT AND PLAN:   Fidelia Villar is a 78 year old female who presents with the followin. Cervical radiculitis  I am going to send a message to your specialist, I do think it is okay for you to take an extra half or full pill of the Tylenol 3 during the day at times if we needed, I also think we should be on a once a day dose of the duloxetine 90 mg, but we can stay with the split dosing for now I did refill that medication, there is some x-rays ready to do, [facility] stop at the [facility] on the way home to have this objective hide    2. Neck pain  We may eventually need a neck injection here, I will message this to your specialist  - DULoxetine 30 MG Oral Cap DR Particles; Take 1 capsule (30 mg total) by mouth daily.  Dispense: 30 capsule; Refill: 5  - DULoxetine 60 MG Oral Cap DR Particles; Take 1 capsule (60 mg total) by mouth every evening.  Dispense: 30 capsule; Refill: 5    3. Chronic right shoulder pain & s/p right rotator cuff repair x2  Stable, as above    4. Benign hypertension  Stable continue current monitoring management    5. Peripheral polyneuropathy  Continue current gabapentin dosing.    -For the history of falls, I will do like the idea of you getting a lifeline alert, and or possibly a phone so you have something to communicate if you do make it to the ground.    Spent 30 minutes obtaining history, evaluating patient, discussing treatment options, diet, exercise, review of available labs and radiology reports, and completing documentation.    Jeff Anthony D'Amico,   2024  10:34 AM

## 2024-06-10 ENCOUNTER — OFFICE VISIT (OUTPATIENT)
Dept: PHYSICAL MEDICINE AND REHAB | Facility: CLINIC | Age: 79
End: 2024-06-10
Payer: MEDICARE

## 2024-06-10 VITALS — BODY MASS INDEX: 40.84 KG/M2 | WEIGHT: 208 LBS | RESPIRATION RATE: 18 BRPM | HEIGHT: 60 IN

## 2024-06-10 DIAGNOSIS — M25.511 CHRONIC RIGHT SHOULDER PAIN: ICD-10-CM

## 2024-06-10 DIAGNOSIS — S46.011D TRAUMATIC COMPLETE TEAR OF RIGHT ROTATOR CUFF, SUBSEQUENT ENCOUNTER: ICD-10-CM

## 2024-06-10 DIAGNOSIS — G89.29 CHRONIC NECK PAIN: ICD-10-CM

## 2024-06-10 DIAGNOSIS — M19.011 PRIMARY OSTEOARTHRITIS OF RIGHT SHOULDER: ICD-10-CM

## 2024-06-10 DIAGNOSIS — G60.8 SENSORY PERIPHERAL NEUROPATHY: Primary | ICD-10-CM

## 2024-06-10 DIAGNOSIS — M54.2 CHRONIC NECK PAIN: ICD-10-CM

## 2024-06-10 DIAGNOSIS — G89.29 CHRONIC RIGHT SHOULDER PAIN: ICD-10-CM

## 2024-06-10 DIAGNOSIS — M54.12 RIGHT CERVICAL RADICULOPATHY: ICD-10-CM

## 2024-06-10 DIAGNOSIS — E66.01 SEVERE OBESITY (BMI >= 40) (HCC): Chronic | ICD-10-CM

## 2024-06-10 PROCEDURE — 1160F RVW MEDS BY RX/DR IN RCRD: CPT | Performed by: PHYSICAL MEDICINE & REHABILITATION

## 2024-06-10 PROCEDURE — 99214 OFFICE O/P EST MOD 30 MIN: CPT | Performed by: PHYSICAL MEDICINE & REHABILITATION

## 2024-06-10 PROCEDURE — 3008F BODY MASS INDEX DOCD: CPT | Performed by: PHYSICAL MEDICINE & REHABILITATION

## 2024-06-10 PROCEDURE — 1159F MED LIST DOCD IN RCRD: CPT | Performed by: PHYSICAL MEDICINE & REHABILITATION

## 2024-06-10 PROCEDURE — 1125F AMNT PAIN NOTED PAIN PRSNT: CPT | Performed by: PHYSICAL MEDICINE & REHABILITATION

## 2024-06-10 NOTE — PROGRESS NOTES
Cervical Pain H & P    Chief Complaint:    Chief Complaint   Patient presents with    Follow - Up     Returning patient here for follow up. LOV 4/8/24. Continues with pain to neck and R shoulder. XR completed per PCP on 6/4/24. Symptoms are unchanged. Reports pain t R side of neck that radiates downt he R shoulder to hand.  Describes as \"sharp\" stabbing pain.      Nursing note reviewed and verified.    Patient was last seen on 4/8/2024.  I did a right shoulder injection at that time which helped some for 1-2 weeks and then the pain returned.  She has been dealing with right neck pain for about 2 months.  This pain will radiate down the right arm and into the all of the fingers of the right hand.  She still has the right chest wall pain.  She tingling and stabbing in the right arm when she has the pain. She will have a crawling sensation in the left arm at times.  Dr. D'Amico had her get cervical spine x-rays.  She did the PT with [facility] which helped while she was there.    Past Medical History   Past Medical History:    Cataract    Coronary atherosclerosis    High cholesterol    HYPERLIPIDEMIA    OSTEOARTHRITIS    knees R>L    Osteoarthritis    OSTEOPOROSIS    Visual impairment    glasses       Past Surgical History   Past Surgical History:   Procedure Laterality Date    Appendectomy      Cataract      bilat    Cath bare metal stent (bms)      Cath percutaneous  transluminal coronary angioplasty      Colonoscopy  2/09    adenomatous polyp--due in '12    Other surgical history      CTS RELEASE BILAT,     Other surgical history      R rotator repair x 2       Family History   Family History   Problem Relation Age of Onset    Heart Disease Father     Heart Disorder Father         CAD    Other (Other) Mother         DJD    Diabetes Brother     Breast Cancer Sister 73       Social History   Social History     Socioeconomic History    Marital status:      Spouse name: Not on file    Number of children: Not  on file    Years of education: Not on file    Highest education level: Not on file   Occupational History    Not on file   Tobacco Use    Smoking status: Some Days     Current packs/day: 0.75     Average packs/day: 0.8 packs/day for 30.0 years (22.5 ttl pk-yrs)     Types: Cigarettes     Passive exposure: Current    Smokeless tobacco: Never   Vaping Use    Vaping status: Never Used   Substance and Sexual Activity    Alcohol use: No    Drug use: No    Sexual activity: Not on file   Other Topics Concern    Not on file   Social History Narrative    Not on file     Social Determinants of Health     Financial Resource Strain: Not on file   Food Insecurity: Not on file   Transportation Needs: Not on file   Physical Activity: Not on file   Stress: Not on file   Social Connections: Not on file   Housing Stability: Not on file       PE:  The patient does appear in her stated age in no distress.  The patient is well groomed.    Psychiatric:  The patient is alert and oriented x 3.  The patient has a normal affect and mood.      Respiratory:  No acute respiratory distress. Patient does not have a cough.    HEENT:  Extraocular muscles are intact. There is no kern icterus. Pupils are equal, round, and reactive to light. No redness or discharge bilaterally.    Skin:  There are no rashes or lesions.    Lymph Nodes:  The patient has no palpable submandibular, supraclavicular, and cervical lymph nodes..    Vitals:  Vitals:    06/10/24 1655   Resp: 18       Cervical Spine:    Posture: moderate-severe chin forward superiorly rotated protracted shoulder posture.   Shoulders: Level   Head: In neutral   Spinous Processes Palpations: Tender at C7   Z-Joints Palpations: Tender at  right C2-3, right C3-4, right C4-5, right C5-6, and right C6-7   Muscular Palpations: Tender at  right upper trapezius  right levator scapula(e)  bilateral rhomboid  bilateral pectoralis minor  bilateral scalene     Shoulder: The shoulders are stable.    Medial  Border Scapular Winging: absent   Right Scapula: laterally displaced   Left Scapula: laterally displaced   Palpation: Tender shoulder palpations at right acromioclavicular joint and right subacromial bursa.   Right Internal Rotation: mildly limited   Left Internal Rotation: normal   Right External Rotation: mildly limited   Left External Rotation: normal   Shoulder Special Tests: Right Vizcarra test: Positive  Left Vizcarra test: Negative  Right external rotation: Positive  Left external rotation: Negative     Vascular upper extremity:   Right radial pulses: 2+   Left radial pulses: 2+      Neurological Upper Extremity:    Light Touch: Intact in Bilateral UE except:  Decreased in the right upper lateral arm and right upper posterior arm.   Pin Prick: Not tested.   UE Muscle Strength: All Upper Extremity strength measurements 3+/5 except for the left biceps which is 4+/5 and the left triceps which  is 4/5.   Reflexes: 2+ In the bilateral upper extremities.   Antony's sign Right: Negative   Antony's sigh Left: Negative     C-Spine Special Tests  Special Tests: Right Bakody's sign: Positive     Radiology Imaging:  I reviewed with the patient her X-ray of the cervical spine from 6/4/2024.      Assessment  1. Sensory peripheral neuropathy: axonal    2. Severe obesity (BMI >= 40) ([facility])    3. Chronic neck pain    4. Chronic right shoulder pain & s/p right rotator cuff repair x2    5. Primary osteoarthritis of right shoulder: moderate glenohumeral and AC joint    6. Traumatic complete tear of right rotator cuff, subsequent encounter    7. Right cervical radiculopathy        Plan  I will do a right arm EMG/NCS.    She will need to get a MRI of the cervical spine pending the results of the above.    The patient will continue with their current pain medications.    She will follow  up after having the above.    The patient understands and agrees with the stated plan.    Marcelino Chong MD  6/10/2024

## 2024-06-10 NOTE — PATIENT INSTRUCTIONS
Plan  I will do a right arm EMG/NCS.    She will need to get a MRI of the cervical spine pending the results of the above.    The patient will continue with their current pain medications.    She will follow  up after having the above.

## 2024-06-14 ENCOUNTER — TELEPHONE (OUTPATIENT)
Dept: PHYSICAL MEDICINE AND REHAB | Facility: CLINIC | Age: 79
End: 2024-06-14

## 2024-07-08 DIAGNOSIS — M54.16 LUMBAR RADICULOPATHY: ICD-10-CM

## 2024-07-08 NOTE — TELEPHONE ENCOUNTER
Refill Request    Medication request: acetaminophen-codeine 300-30 MG Oral Tab Take 1 tablet by mouth every 6 (six) hours as needed for Pain.     LOV:6/10/2024 Marcelino Chong MD   Due back to clinic per last office note:  Follow up after EMG and MRI  NOV: 7/15/2024 Marcelino Chong MD      [place]: 5/30/24 #30 - 7 day supply    Urine drug screen (if applicable): none  Pain contract: none    LOV plan (if weaning or changing medications): Per  at LOV: \"The patient will continue with their current pain medications. \"

## 2024-07-09 RX ORDER — ACETAMINOPHEN AND CODEINE PHOSPHATE 300; 30 MG/1; MG/1
1 TABLET ORAL EVERY 6 HOURS PRN
Qty: 30 TABLET | Refills: 0 | Status: SHIPPED | OUTPATIENT
Start: 2024-07-09

## 2024-07-15 ENCOUNTER — OFFICE VISIT (OUTPATIENT)
Dept: SURGERY | Facility: CLINIC | Age: 79
End: 2024-07-15
Payer: MEDICARE

## 2024-07-15 ENCOUNTER — TELEPHONE (OUTPATIENT)
Dept: PHYSICAL MEDICINE AND REHAB | Facility: CLINIC | Age: 79
End: 2024-07-15

## 2024-07-15 VITALS
TEMPERATURE: 97 F | BODY MASS INDEX: 42 KG/M2 | RESPIRATION RATE: 16 BRPM | HEART RATE: 76 BPM | OXYGEN SATURATION: 90 % | SYSTOLIC BLOOD PRESSURE: 107 MMHG | DIASTOLIC BLOOD PRESSURE: 73 MMHG | WEIGHT: 213 LBS

## 2024-07-15 DIAGNOSIS — Z12.31 SCREENING MAMMOGRAM FOR BREAST CANCER: ICD-10-CM

## 2024-07-15 DIAGNOSIS — N64.4 BREAST PAIN, RIGHT: Primary | ICD-10-CM

## 2024-07-15 PROCEDURE — 3078F DIAST BP <80 MM HG: CPT

## 2024-07-15 PROCEDURE — 1160F RVW MEDS BY RX/DR IN RCRD: CPT

## 2024-07-15 PROCEDURE — 3074F SYST BP LT 130 MM HG: CPT

## 2024-07-15 PROCEDURE — 1159F MED LIST DOCD IN RCRD: CPT

## 2024-07-15 PROCEDURE — 99203 OFFICE O/P NEW LOW 30 MIN: CPT

## 2024-07-15 NOTE — PATIENT INSTRUCTIONS
Start vitamin E 400-600 units daily to help with breast pain   Wear a good supportive bra  Mammogram in march 2025

## 2024-07-16 NOTE — PROGRESS NOTES
Breast Surgery New Patient Consultation    This is the first visit for this 79 year old woman, referred by Dr. D'Amico, who presents for evaluation of right breast pain and self palpated breast mass.    History of Present Illness:   Ms. Fidelia Villar is a 79 year old woman who presents with right breast pain. She was referred for diagnostic imaging which took place on 3/20/2024 which showed benign findings. She denies any nipple discharge, skin changes, or axillary adenopathy. She does have breast pain. She does not have significant past history for breast diseases or breast biopsy. She does have family history of breast cancer. She is here today for evaluation and recommendations for further therapy.        Past Medical History:    Cataract    Coronary atherosclerosis    High cholesterol    HYPERLIPIDEMIA    OSTEOARTHRITIS    knees R>L    Osteoarthritis    OSTEOPOROSIS    Visual impairment    glasses       Past Surgical History:   Procedure Laterality Date    Appendectomy      Cataract      bilat    Cath bare metal stent (bms)      Cath percutaneous  transluminal coronary angioplasty      Colonoscopy      adenomatous polyp--due in     Other surgical history      CTS RELEASE BILAT,     Other surgical history      R rotator repair x 2       Gynecological History:  Pt is a   Pt was 24 years old at time of first pregnancy.    She has cumulative breastfeeding history of 12 months.  She achieved menarche at age 12 and LMP age 43  Age of Menopause: 43  Type: natural menopause  She denies any history of hormone replacement therapy  She denies any history of oral contraceptive use   She denies infertility treatment to achieve pregnancy.    Medications:     acetaminophen-codeine 300-30 MG Oral Tab Take 1 tablet by mouth every 6 (six) hours as needed for Pain. 30 tablet 0    DULoxetine 30 MG Oral Cap DR Particles Take 1 capsule (30 mg total) by mouth daily. 30 capsule 5    DULoxetine 60 MG Oral Cap DR Particles  Take 1 capsule (60 mg total) by mouth every evening. 30 capsule 5    methocarbamol 750 MG Oral Tab Take 1 tablet (750 mg total) by mouth 3 (three) times daily. 270 tablet 1    gabapentin 400 MG Oral Cap Take 2 capsules (800 mg total) by mouth 2 (two) times daily. 180 capsule 3    Magnesium Bisglycinate (MAG GLYCINATE) 100 MG Oral Tab Take 400 mg by mouth at bedtime. 90 tablet 11    temazepam 7.5 MG Oral Cap Take 1 capsule (7.5 mg total) by mouth nightly as needed for Sleep. 30 capsule 0    metoprolol succinate ER 25 MG Oral Tablet 24 Hr Take 1 tablet (25 mg total) by mouth daily. 90 tablet 1    diclofenac 1 % External Gel Apply 2 g topically 4 (four) times daily as needed. 100 g 1    amLODIPine 2.5 MG Oral Tab amLODIPine 2.5 mg tablet, [RxNorm: 638840]      alendronate 35 MG Oral Tab Take 1 tablet (35 mg total) by mouth once a week. 30 MINUTES BEFORE THE FIRST FOOD BEVERAGE OR MEDICINE OF THE DAY WITH PLAIN WATER 13 tablet 3    atorvastatin 40 MG Oral Tab Take 1 tablet (40 mg total) by mouth nightly. 90 tablet 3    Calcium Carb-Cholecalciferol (CALCIUM 600/VITAMIN D3) 600-20 MG-MCG Oral Tab Take by mouth 2 (two) times a day.      latanoprost 0.005 % Ophthalmic Solution Place 1 drop into both eyes daily.      aspirin 81 MG Oral Chew Tab Chew 4 tablets (324 mg total) by mouth daily.      acetaminophen 325 MG Oral Tab Take 500 mg by mouth 3 (three) times daily.         Allergies:    Allergies   Allergen Reactions    Iodine (Topical) RASH and UNKNOWN    Iodine I 131 Tositumomab RASH       Family History:   Family History   Problem Relation Age of Onset    Heart Disease Father     Heart Disorder Father         CAD    Other (Other) Mother         DJD    Diabetes Brother     Breast Cancer Sister 73       She is not of Ashkenazi [employer] ancestry.    Social History:  History   Alcohol Use No       History   Smoking Status    Some Days    Types: Cigarettes   Smokeless Tobacco    Never     Ms. Fidelia Villar is  with 2  children. She has 3 siblings. She is currently Retired    Review of Systems:  General:   The patient denies, fever, chills, night sweats, fatigue, generalized weakness, change in appetite or weight loss.    HEENT:     The patient denies eye irritation, cataracts, redness, glaucoma, yellowing of the eyes, change in vision, color blindness, or +wearing contacts/glasses. The patient denies hearing loss, ringing in the ears, ear drainage, earaches, nasal congestion, nose bleeds, +snoring, pain in mouth/throat, hoarseness, change in voice, facial trauma.    Respiratory:  The patient denies chronic cough, phlegm, hemoptysis, pleurisy/chest pain, pneumonia, asthma, wheezing, difficulty in breathing with exertion, emphysema, chronic bronchitis, shortness of breath or abnormal sound when breathing.     Cardiovascular:  There is no history of +chest pain, chest pressure/discomfort, palpitations, irregular heartbeat, fainting or near-fainting, difficulty breathing when lying flat, SOB/Coughing at night, swelling of the legs or chest pain while walking.    Breasts:  See history of present illness    Gastrointestinal:     There is no history of difficulty or pain with swallowing, reflux symptoms, vomiting, dark or bloody stools, constipation, yellowing of the skin, indigestion, nausea, change in bowel habits, diarrhea, abdominal pain or vomiting blood.     Genitourinary:  The patient denies +frequent urination, +needing to get up at night to urinate, urinary hesitancy or retaining urine, painful urination, urinary incontinence, decreased urine stream, blood in the urine or vaginal/penile discharge.    Skin:    The patient denies rash, itching, skin lesions, +dry skin, change in skin color or change in moles.     Hematologic/Lymphatic:  The patient denies easily bruising or bleeding or persistent swollen glands or lymph nodes.     Musculoskeletal:  The patient denies +muscle aches/pain, +joint pain, +stiff joints, +neck pain,  +back pain or bone pain.    Neuropsychiatric:  There is no history of migraines or severe headaches, seizure/epilepsy, speech problems, coordination problems, trembling/tremors, fainting/black outs, dizziness, memory problems, loss of sensation/numbness, +problems walking, +weakness, +tingling or burning in hands/feet. There is no history of abusive relationship, bipolar disorder, sleep disturbance, anxiety, depression or feeling of despair.    Endocrine:    There is no history of poor/slow wound healing, weight loss/gain, fertility or hormone problems, cold intolerance, thyroid disease.     Allergic/Immunologic:  There is no history of hives, hay fever, angioedema or anaphylaxis.    /73 (BP Location: Right arm, Patient Position: Sitting, Cuff Size: adult)   Pulse 76   Temp 97.3 °F (36.3 °C) (Tympanic)   Resp 16   Wt 96.6 kg (213 lb)   SpO2 90%   BMI 41.60 kg/m²     Physical Exam:  The patient is an alert, oriented, well-nourished and  well-developed woman who appears her stated age. Her speech patterns and movements are normal. Her affect is appropriate.    HEENT: The head is normocephalic. The neck is supple. The thyroid is not enlarged and is without palpable masses/nodules. There are no palpable masses. The trachea is in the midline. Conjunctiva are clear, non-icteric.    Chest: The chest expands symmetrically. The lungs are clear to auscultation.    Heart: The rhythm is regular.  There are no murmurs, rubs, gallops or thrills.    Breasts:  Her breasts are symmetrical.  Right breast: The skin, nipple ,and areola appear normal. There is no skin dimpling with movement of the pectoralis. There is no nipple retraction. No nipple discharge can be elicited. The parenchyma is mildly nodular. There are no dominant masses in the breast. The axillary tail is normal.  Left breast:   The skin, nipple, and areola appear normal. There is no skin dimpling with movement of the pectoralis. There is no nipple  retraction. No nipple discharge can be elicited. The parenchyma is mildly nodular. There are no dominant masses in the breast. The axillary tail is normal.    Abdomen:  The abdomen is soft, flat and non tender. The liver is not enlarged. There are no palpable masses.    Lymph Nodes:  The supraclavicular, axillary and cervical regions are free of significant lymphadenopathy.    Back: There is no vertebral column tenderness.    Skin: The skin appears normal. There are no suspicious appearing rashes or lesions.    Extremities: The extremities are without deformity, cyanosis or edema.    Impression:   Ms. Fidelia Villar is a 79 year old woman presents with right breast pain and self palpated lump with no imaging findings.       Discussion and Plan:  I had a discussion with the Patient regarding her breast exam. On exam today I found no evidence of malignancy bilaterally. She continues to have pain to her right breast. This could be due to her pain in her right shoulder that is radiating down to her breast. I personally reviewed her recent mammogram and ultrasound and we discussed this at length.    With regard to her significant mastalgia, we discussed while there is no good reliable treatment, it has been identified that avoiding caffeine, wearing a well-supportive bra and chest wall strengthening exercises may all help with alleviating symptoms. We also discussed that oral Vit E 400-600 IU daily has also been found to alleviate cyclical mastalgia in some patients secondary to its anti-inflammatory properties without significant side effects. For this reason, I have recommended that she initiate this to combat her symptoms.     She was given ample opportunity for questions and those questions were answered to her satisfaction. She has been  encouraged to contact the office with any questions or concerns prior to her next appointment.     This encounter lasted a total of 30 minutes, more than 50% of which was dedicated  to the discussion of management options.

## 2024-07-23 ENCOUNTER — PROCEDURE VISIT (OUTPATIENT)
Dept: PHYSICAL MEDICINE AND REHAB | Facility: CLINIC | Age: 79
End: 2024-07-23
Payer: MEDICARE

## 2024-07-23 DIAGNOSIS — M54.12 RIGHT CERVICAL RADICULOPATHY: Primary | ICD-10-CM

## 2024-07-23 PROCEDURE — 95886 MUSC TEST DONE W/N TEST COMP: CPT | Performed by: PHYSICAL MEDICINE & REHABILITATION

## 2024-07-23 PROCEDURE — 95909 NRV CNDJ TST 5-6 STUDIES: CPT | Performed by: PHYSICAL MEDICINE & REHABILITATION

## 2024-07-23 PROCEDURE — 1159F MED LIST DOCD IN RCRD: CPT | Performed by: PHYSICAL MEDICINE & REHABILITATION

## 2024-07-23 PROCEDURE — 1160F RVW MEDS BY RX/DR IN RCRD: CPT | Performed by: PHYSICAL MEDICINE & REHABILITATION

## 2024-07-30 NOTE — PROCEDURES
[address]  [address] [place] [address]  Phone: [phone]  Fax: [phone]    ELECTRODIAGNOSTIC REPORT          Patient: RADHA MORALES Hand Dominance:  right   Patient ID: [id number] Referring Dr:  Dr. Chong   Sex: Female Test Dr:      YOB: 1945           Visit Date: 79 Years 0 Months Examining MD:     Age: 79 Years 0 Months Referred by:     Height:       Referred for: RUE EMG. Presents with numbness and tingling in right hand. Sharp, shooting pain from neck into fingers. Worse on the right.     4/5 giveway weakness throughout BUE. Sensation to LT intact and symmetric. DTRs 2+. Negative Antony's               Summary    The motor conduction test was normal in all 2 of the tested nerves: R Median - APB, R Ulnar - ADM.    The sensory conduction test was normal in all 3 of the tested nerves: R Median - Digit II (Antidromic), R Ulnar - Digit V (Antidromic), R Radial - Anatomical snuff box (Forearm).    The needle EMG examination was performed in 6 muscles. It was normal in 2 muscle(s): R. Flexor carpi radialis, R. Triceps brachii. The study was abnormal in 4 muscle(s), with the following distribution:  The MUP waveform abnormality was found in R. Abductor pollicis brevis, R. First dorsal interosseous, R. Biceps brachii, R. Deltoid.          Conclusion:  This is an abnormal study.  There is electrodiagnostic evidence of   Right C8 chronic radiculopathy.  Right C5-6 subacute radiculopathy.  There is no brachial plexopathy.  There is no peripheral nerve entrapment.  There is no peripheral neuropathy.  There is no myopathy.      Marcelino Chong M.D.   Diplomate, American Board of Physical Medicine and Rehabilitation           Motor NCS      Nerve / Sites Muscle Latency Amplitude Amp % Duration Segments Distance Lat Diff Velocity     ms mV % ms  cm ms m/s   R Median - APB      Wrist APB 3.13 5.8 100 5.68 Wrist - APB 8        Elbow APB 6.46 5.0 87.2 5.94  Elbow - Wrist 18 3.33 54   R Ulnar - ADM      Wrist ADM 2.55 10.6 100 4.43 Wrist - ADM 8        B.Elbow ADM 5.94 9.5 89.9 4.64 B.Elbow - Wrist 20 3.39 59      A.Elbow ADM 7.40 9.4 89.1 4.74 A.Elbow - B.Elbow 9 1.46 62       Sensory NCS      Nerve / Sites Rec. Site Onset Lat Peak Lat NP Amp PP Amp Segments Distance Velocity     ms ms µV µV  cm m/s   R Median - Digit II (Antidromic)      Wrist Dig II 2.45 3.02 13.3 30.1 Wrist - Dig II 14 57   R Ulnar - Digit V (Antidromic)      Wrist Dig V 2.45 3.28 29.3 29.5 Wrist - Dig V 14 57   R Radial - Anatomical snuff box (Forearm)      Forearm Wrist 1.72 2.24 23.4 27.2 Forearm - Wrist 10 58       EMG Summary Table     Spontaneous MUAP Recruitment   Muscle Nerve Roots IA Fib PSW Fasc H.F. Comments Amp Dur. PPP Pattern   R. Abductor pollicis brevis Median C8-T1 N None None None None Normal 8-10 N N N   R. First dorsal interosseous Ulnar C8-T1 N None None None None Normal 5-7 N N N   R. Flexor carpi radialis Median C6-C7 N None None None None Normal N N N N   R. Biceps brachii Musculocutaneous C5-C6 N None None None None Normal N N 1+ N   R. Triceps brachii Radial C6-C8 N None None None None Normal N N N N   R. Deltoid Axillary C5-C6 N None None None None Normal N N 2+ N

## 2024-08-14 DIAGNOSIS — M54.16 LUMBAR RADICULOPATHY: ICD-10-CM

## 2024-08-14 NOTE — TELEPHONE ENCOUNTER
Refill Request    Medication request: acetaminophen-codeine 300-30 MG Oral Tab Take 1 tablet by mouth every 6 (six) hours as needed for Pain.     LOV:6/10/2024 Marcelino Chong MD   Due back to clinic per last office note:  F/U after EMG & MRI of Cervical Spine  NOV: Visit date not found      [place]: 7/09/2024 #30    Urine drug screen (if applicable):none  Pain contract: none    LOV plan (if weaning or changing medications): Per Dr. Chong's LOV notes: \"The patient will continue with their current pain medications. \"

## 2024-08-16 RX ORDER — ACETAMINOPHEN AND CODEINE PHOSPHATE 300; 30 MG/1; MG/1
1 TABLET ORAL EVERY 6 HOURS PRN
Qty: 30 TABLET | Refills: 0 | Status: SHIPPED | OUTPATIENT
Start: 2024-08-16

## 2024-08-26 ENCOUNTER — HOSPITAL ENCOUNTER (OUTPATIENT)
Dept: GENERAL RADIOLOGY | Age: 79
Discharge: HOME | End: 2024-08-26
Attending: INTERNAL MEDICINE
Payer: MEDICARE

## 2024-08-26 ENCOUNTER — LAB ENCOUNTER (OUTPATIENT)
Dept: LAB | Age: 79
End: 2024-08-26
Attending: INTERNAL MEDICINE
Payer: MEDICARE

## 2024-08-26 DIAGNOSIS — Z01.818 PRE-OP TESTING: ICD-10-CM

## 2024-08-26 DIAGNOSIS — I25.10 CAD IN NATIVE ARTERY: ICD-10-CM

## 2024-08-26 DIAGNOSIS — I25.10 CAD (CORONARY ARTERY DISEASE): ICD-10-CM

## 2024-08-26 DIAGNOSIS — Z01.818 PRE-OP TESTING: Primary | ICD-10-CM

## 2024-08-26 LAB
ANION GAP SERPL CALC-SCNC: 4 MMOL/L (ref 0–18)
BASOPHILS # BLD AUTO: 0.1 X10(3) UL (ref 0–0.2)
BASOPHILS NFR BLD AUTO: 0.9 %
BUN BLD-MCNC: 10 MG/DL (ref 9–23)
BUN/CREAT SERPL: 14.3 (ref 10–20)
CALCIUM BLD-MCNC: 10 MG/DL (ref 8.7–10.4)
CHLORIDE SERPL-SCNC: 106 MMOL/L (ref 98–112)
CO2 SERPL-SCNC: 28 MMOL/L (ref 21–32)
CREAT BLD-MCNC: 0.7 MG/DL
DEPRECATED RDW RBC AUTO: 42.7 FL (ref 35.1–46.3)
EGFRCR SERPLBLD CKD-EPI 2021: 87 ML/MIN/1.73M2 (ref 60–?)
EOSINOPHIL # BLD AUTO: 0.24 X10(3) UL (ref 0–0.7)
EOSINOPHIL NFR BLD AUTO: 2.3 %
ERYTHROCYTE [DISTWIDTH] IN BLOOD BY AUTOMATED COUNT: 13.5 % (ref 11–15)
FASTING STATUS PATIENT QL REPORTED: NO
GLUCOSE BLD-MCNC: 99 MG/DL (ref 70–99)
HCT VFR BLD AUTO: 43.1 %
HGB BLD-MCNC: 14.1 G/DL
IMM GRANULOCYTES # BLD AUTO: 0.05 X10(3) UL (ref 0–1)
IMM GRANULOCYTES NFR BLD: 0.5 %
LYMPHOCYTES # BLD AUTO: 3.09 X10(3) UL (ref 1–4)
LYMPHOCYTES NFR BLD AUTO: 29.2 %
MCH RBC QN AUTO: 28.5 PG (ref 26–34)
MCHC RBC AUTO-ENTMCNC: 32.7 G/DL (ref 31–37)
MCV RBC AUTO: 87.1 FL
MONOCYTES # BLD AUTO: 1.07 X10(3) UL (ref 0.1–1)
MONOCYTES NFR BLD AUTO: 10.1 %
NEUTROPHILS # BLD AUTO: 6.04 X10 (3) UL (ref 1.5–7.7)
NEUTROPHILS # BLD AUTO: 6.04 X10(3) UL (ref 1.5–7.7)
NEUTROPHILS NFR BLD AUTO: 57 %
OSMOLALITY SERPL CALC.SUM OF ELEC: 285 MOSM/KG (ref 275–295)
PLATELET # BLD AUTO: 317 10(3)UL (ref 150–450)
POTASSIUM SERPL-SCNC: 4.5 MMOL/L (ref 3.5–5.1)
RBC # BLD AUTO: 4.95 X10(6)UL
SODIUM SERPL-SCNC: 138 MMOL/L (ref 136–145)
WBC # BLD AUTO: 10.6 X10(3) UL (ref 4–11)

## 2024-08-26 PROCEDURE — 85025 COMPLETE CBC W/AUTO DIFF WBC: CPT

## 2024-08-26 PROCEDURE — 36415 COLL VENOUS BLD VENIPUNCTURE: CPT

## 2024-08-26 PROCEDURE — 80048 BASIC METABOLIC PNL TOTAL CA: CPT

## 2024-08-26 PROCEDURE — 71046 X-RAY EXAM CHEST 2 VIEWS: CPT | Performed by: INTERNAL MEDICINE

## 2024-09-03 RX ORDER — PREDNISONE 50 MG/1
50 TABLET ORAL EVERY 6 HOURS
COMMUNITY
Start: 2024-09-05 | End: 2024-09-06

## 2024-09-06 ENCOUNTER — HOSPITAL ENCOUNTER (OUTPATIENT)
Dept: INTERVENTIONAL RADIOLOGY/VASCULAR | Facility: HOSPITAL | Age: 79
Discharge: HOME | End: 2024-09-06
Attending: INTERNAL MEDICINE | Admitting: INTERNAL MEDICINE
Payer: MEDICARE

## 2024-09-06 VITALS
DIASTOLIC BLOOD PRESSURE: 89 MMHG | HEIGHT: 60 IN | WEIGHT: 217 LBS | OXYGEN SATURATION: 94 % | BODY MASS INDEX: 42.6 KG/M2 | SYSTOLIC BLOOD PRESSURE: 139 MMHG | HEART RATE: 81 BPM | TEMPERATURE: 97 F | RESPIRATION RATE: 15 BRPM

## 2024-09-06 DIAGNOSIS — I25.811 CAD (CORONARY ARTERY DISEASE), NATIVE ARTERY TRANSPLANTED HEART: ICD-10-CM

## 2024-09-06 DIAGNOSIS — R94.39 ABNORMAL NUCLEAR STRESS TEST: ICD-10-CM

## 2024-09-06 DIAGNOSIS — R07.9 CHEST PAIN: ICD-10-CM

## 2024-09-06 DIAGNOSIS — Z98.61 S/P PERCUTANEOUS TRANSLUMINAL CORONARY ANGIOPLASTY: ICD-10-CM

## 2024-09-06 DIAGNOSIS — E78.5 HYPERLIPIDEMIA, UNSPECIFIED HYPERLIPIDEMIA TYPE: ICD-10-CM

## 2024-09-06 LAB — ISTAT ACTIVATED CLOTTING TIME: 275 SECONDS (ref 125–137)

## 2024-09-06 PROCEDURE — 99153 MOD SED SAME PHYS/QHP EA: CPT | Performed by: INTERNAL MEDICINE

## 2024-09-06 PROCEDURE — 85347 COAGULATION TIME ACTIVATED: CPT

## 2024-09-06 PROCEDURE — B2151ZZ FLUOROSCOPY OF LEFT HEART USING LOW OSMOLAR CONTRAST: ICD-10-PCS | Performed by: INTERNAL MEDICINE

## 2024-09-06 PROCEDURE — 02F03ZZ FRAGMENTATION IN CORONARY ARTERY, ONE ARTERY, PERCUTANEOUS APPROACH: ICD-10-PCS | Performed by: INTERNAL MEDICINE

## 2024-09-06 PROCEDURE — 027034Z DILATION OF CORONARY ARTERY, ONE ARTERY WITH DRUG-ELUTING INTRALUMINAL DEVICE, PERCUTANEOUS APPROACH: ICD-10-PCS | Performed by: INTERNAL MEDICINE

## 2024-09-06 PROCEDURE — 99152 MOD SED SAME PHYS/QHP 5/>YRS: CPT | Performed by: INTERNAL MEDICINE

## 2024-09-06 PROCEDURE — 99211 OFF/OP EST MAY X REQ PHY/QHP: CPT

## 2024-09-06 PROCEDURE — 4A023N7 MEASUREMENT OF CARDIAC SAMPLING AND PRESSURE, LEFT HEART, PERCUTANEOUS APPROACH: ICD-10-PCS | Performed by: INTERNAL MEDICINE

## 2024-09-06 PROCEDURE — 93458 L HRT ARTERY/VENTRICLE ANGIO: CPT | Performed by: INTERNAL MEDICINE

## 2024-09-06 PROCEDURE — 36415 COLL VENOUS BLD VENIPUNCTURE: CPT

## 2024-09-06 PROCEDURE — 92972 PERQ TRLUML CORONRY LITHOTRP: CPT | Performed by: INTERNAL MEDICINE

## 2024-09-06 RX ORDER — SODIUM CHLORIDE 9 MG/ML
3 INJECTION, SOLUTION INTRAVENOUS
Status: COMPLETED | OUTPATIENT
Start: 2024-09-06 | End: 2024-09-06

## 2024-09-06 RX ORDER — VERAPAMIL HYDROCHLORIDE 2.5 MG/ML
INJECTION, SOLUTION INTRAVENOUS
Status: COMPLETED
Start: 2024-09-06 | End: 2024-09-06

## 2024-09-06 RX ORDER — CHLORHEXIDINE GLUCONATE 40 MG/ML
SOLUTION TOPICAL
Status: COMPLETED | OUTPATIENT
Start: 2024-09-06 | End: 2024-09-06

## 2024-09-06 RX ORDER — CLOPIDOGREL BISULFATE 75 MG/1
TABLET ORAL
Status: COMPLETED
Start: 2024-09-06 | End: 2024-09-06

## 2024-09-06 RX ORDER — HEPARIN SODIUM 1000 [USP'U]/ML
INJECTION, SOLUTION INTRAVENOUS; SUBCUTANEOUS
Status: COMPLETED
Start: 2024-09-06 | End: 2024-09-06

## 2024-09-06 RX ORDER — ASPIRIN 81 MG/1
81 TABLET ORAL DAILY
Status: DISCONTINUED | OUTPATIENT
Start: 2024-09-07 | End: 2024-09-06

## 2024-09-06 RX ORDER — CLOPIDOGREL BISULFATE 75 MG/1
75 TABLET ORAL DAILY
Status: DISCONTINUED | OUTPATIENT
Start: 2024-09-07 | End: 2024-09-06

## 2024-09-06 RX ORDER — MIDAZOLAM HYDROCHLORIDE 1 MG/ML
INJECTION INTRAMUSCULAR; INTRAVENOUS
Status: COMPLETED
Start: 2024-09-06 | End: 2024-09-06

## 2024-09-06 RX ORDER — LIDOCAINE HYDROCHLORIDE 20 MG/ML
INJECTION, SOLUTION EPIDURAL; INFILTRATION; INTRACAUDAL; PERINEURAL
Status: COMPLETED
Start: 2024-09-06 | End: 2024-09-06

## 2024-09-06 RX ORDER — NITROGLYCERIN 20 MG/100ML
INJECTION INTRAVENOUS
Status: COMPLETED
Start: 2024-09-06 | End: 2024-09-06

## 2024-09-06 RX ORDER — ASPIRIN 81 MG/1
324 TABLET, CHEWABLE ORAL ONCE
Status: DISCONTINUED | OUTPATIENT
Start: 2024-09-06 | End: 2024-09-06

## 2024-09-06 RX ORDER — SODIUM CHLORIDE 9 MG/ML
INJECTION, SOLUTION INTRAVENOUS CONTINUOUS
Status: ACTIVE | OUTPATIENT
Start: 2024-09-06 | End: 2024-09-06

## 2024-09-06 RX ORDER — CLOPIDOGREL BISULFATE 75 MG/1
75 TABLET ORAL DAILY
Qty: 90 TABLET | Refills: 3 | Status: SHIPPED | OUTPATIENT
Start: 2024-09-07

## 2024-09-06 RX ORDER — ATORVASTATIN CALCIUM 80 MG/1
80 TABLET, FILM COATED ORAL NIGHTLY
Qty: 90 TABLET | Refills: 3 | Status: SHIPPED | OUTPATIENT
Start: 2024-09-06

## 2024-09-06 RX ADMIN — CHLORHEXIDINE GLUCONATE: 40 SOLUTION TOPICAL at 09:15:00

## 2024-09-06 RX ADMIN — SODIUM CHLORIDE 3 ML/KG/HR: 9 INJECTION, SOLUTION INTRAVENOUS at 09:15:00

## 2024-09-06 NOTE — IVS NOTE
Hand-Off     Procedure hand off report given to Jayleen MURRAY.   Pt's vital signs are stable.   Procedural access site is dry and intact with no signs or symptoms of bleeding or hematoma.   Dr. Soriano spoke with patient/family post procedure.

## 2024-09-06 NOTE — PROCEDURES
[facility]  part of [facility]    Cardiac Cath Procedure Note  Afrodlucille Quintanaas Patient Status:  Outpatient    7/15/1945 MRN [id number]   Location [facility] INTERVENTIONAL SUITES Attending Yunior Soriano MD   Hosp Day # 0 PCP Jeff Anthony D'Amico, DO       Cardiologist: Yunior Soriano MD  Primary Proceduralist: Yunior Soriano MD  Procedure Performed: LHC, COR, LV, and shockwave, PCI RCA  Date of Procedure: 2024   Indication: Abnormal stress test    Summary of procedure:    Successful shockwave and PCI proximal RCA, distal RCA.  Patent mid RCA stent.  Unchanged LCA anatomy, nonobstructive.        Recommendations:  DAPT: Aspirin and Plavix  Observe for 4 hours and discharge home      Left Ventriculography and hemodynamics:   LV EF not done  LV EDP 12 mmHg  No gradient across aortic valve        Coronary Angiography  RCA:   80% calcified proximal RCA stenosis, mid RCA stent nonobstructive, 95% distal RCA stenosis.    Left main:  Free of obstructive disease    Left anterior descending:  Free of obstructive disease, supplies multiple diagonals which are non-obstructive    Circumflex:  Free of obstructive disease, supplies multiple OM branches which are patent      RCA intervention  Lesion Characteristics-mildly torturous, severely calcified.  Type non-C lesion.  Pre-intervention stenosis 95%, Post intervention stenosis 0%.  Pre RADHA 3, Post RADHA 3.      Guide Catheter: JR4  Wire: Kate blue  Pre-dil: 3 x 15 mm balloon distal and proximal to help deliver guide extension into the mid vessel  Stent: 3 x 16 mm Synergy CJ 14 lily distal RCA, 3.5 x 24 mm Synergy CJ proximal RCA with underexpansion due to calcium  Post-dil: 3.5 x 20 mm NC balloon at 20 lily, did not give  Shockwave lithotripsy: 3.5 x 12 mm shockwave balloon proximal RCA  Post dilation: 3.5 x 20 mm NC balloon at 18 lily with give      Summary of Case: After written informed consent was obtained from the patient, patient was brought to the  cardiac catheterization laboratory.  Patient was prepped and draped in the usual sterile fashion. Lidocaine 1% was used to infiltrate the right radial artery for local anesthesia and a 6 Azeri introducer sheath was inserted into the right radial artery.      Selective coronary angiography performed with JR4 catheter for RCA and JL3.5 catheter for LCA.  Angiography performed in standard projections.      6 Yakut JR4 catheter placed in LV for hemodynamics.    Specimen sent to: No specimen collected  Estimated blood loss: 10 cc  Closure:  TR band      IV was maintained by RN and moderate conscious sedation of versed and fentanyl was given.  Patient was assessed and monitoring of oxygen, heart rate and blood pressure by nurse and myself during the exam 35 minutes.      Yunior Soriano MD  09/06/24

## 2024-09-06 NOTE — DIETARY NOTE
NUTRITION EDUCATION NOTE     Received consult for cardiac nutrition education. Politely declined verbal discussion at this time. States she will take her information home and let her dtr read over it with her. Provided with Eating Heart-Healthy and NCM handout for pt to take home. Receptive to instruction. May benefit from outpt f/u. Expect fair compliance. RD contact information provided to pt.        Fernanda Hoover RD, LDN  Clinical Dietitian  P: [phone]

## 2024-09-06 NOTE — CARDIAC REHAB
Cardiac Rehab Phase I    Activity:  Post cath bedrest    Education:  Handouts provided and reviewed: Caring For Your Heart Booklet, Smoking Cessation Handout.      Diet: Healthy Cardiac diet reviewed with pt and daughter.  Daughter is .     Disease Process: Disease process reviewed.    Reviewed the following: SITE CARE: reviewed wrist care at home.      RISK FACTORS: reviewed      SMOKING CESSATION: cessation handout given.  Encouraged quitting.      HOME EXERCISE ACTIVITY:  None, pt has bilat knee pain. Reviewed importance.      OUTPATIENT CARDIAC REHAB: Referred to Cardiac Rehabilitation, Phase 2.  Encouraged participation.

## 2024-09-06 NOTE — IVS NOTE
DISCHARGE NOTE     Pt is able to sit up and ambulate without difficulty.   Pt voided and tolerated fluids and food.   Procedural site remains dry and intact with good circulation, motion, and sensation.   No signs and symptoms of bleeding/hematoma noted.   IV access removed  Instruction provided, patient/family verbalizes understanding.   Dr. Soriano spoke with patient/family post procedure.     Pt discharge via wheelchair to [facility]     Follow up Appointment: as scheduled    New Prescription: as prescribed

## 2024-09-06 NOTE — DISCHARGE INSTRUCTIONS
TRANSRADIAL DISCHARGE INSTRUCTION  HOME CARE INSTRUCTIONS FOLLOWING CORONARY ANGIOGRAPHY,  INSERTION OF STENT IN THE CORONARY    Activity  DO NOT drive after the procedure.  You may resume driving late the following day according to the nurse or physician's instructions  Plan on resting and relaxing tonight and tomorrow  Resume your normal activity after 48 hours, or as instructed by your physician  Avoid drinking alcohol for the next 24 hours  Avoid wrist flexion, extension, and fine motor activities (i.e. texting, typing, using computer mouse, etc.) for 24 hours  Do not lift or pull anything heavier than 5 to 8  pounds with affected hand for 1 week    What is Normal?  A small lump at the procedure site associated with mild tenderness when touched  The procedure site may be bruised or discolored  There may be a small amount of drainage on the bandage    Special Instructions   Drink plenty of fluids during the next 24 hours to \"flush\" the contrast from your system  Keep the bandage clean and dry  After 24 hours, you must remove the bandage. Do not put ointment, powders, or creams to site.  You can shower after removing the bandage, and wash the procedure site gently with soap and water  DO NOT submerge the procedure site for 1 week (no bath tubs or pools)  If you choose to wear a bandage for a few days, make sure it remains clean and dry and that it is changed daily  For local swelling: apply ice  Bleeding can occur at the procedure site - both on the outside of the skin and/or beneath the surface of the skin        If bleeding occurs: Elevate hand above heart and apply local pressure  Swelling or a large lump at the procedure site can occur, which may be accompanied by moderate to severe pain  If the bleeding does not stop, call 911 and continue to apply pressure    When to contact physician:   Swelling, pain, or bleeding at the site that is not relieved by applying ice or pressure  Signs of infection: Redness,  warmth, drainage at the site, chills, or temperature of 100.5 or greater  Changes in sensation, numbness, or tingling of affected hand    You Received a Stent:    You will remain on an antiplatelet drug and/or aspirin.  Antiplatelet medications are usually taken for six months to one year and should not be stopped unless your cardiologist directs you to do so.  These medications help to prevent blockage at the stent site.  If another physician or dentist asks you to stop your antiplatelet medication, you need to consult your cardiologist first.  Together, your cardiologist and other physician can discuss the risks that may be involved if you are not taking the antiplatelet medication   If an MRI is necessary, it may be done 4-6 weeks after your procedure.  Verify this with your cardiologist  Keep your stent card with you at all times!  If you need an MRI in the future, your stent card will need to be shown to the technologist before performing the MRI.  A duplicate card CANNOT be reproduced.    Other    You may resume your present diet, unless otherwise specified by your physician.  A list of your medications was provided to you at discharge.  If you are on any METFORMIN containing agents - HOLD for 48 hours       If you have any question or concern, please call the on-call nurse at [phone]

## 2024-09-06 NOTE — IVS NOTE
Plavix sent to [facility] ready to be picked up at zero charge. Atorvastatin sent.  Stent card with daughter.  Patient educated on discharge paperwork.   No questions at this time.

## 2024-09-23 DIAGNOSIS — M54.16 LUMBAR RADICULOPATHY: ICD-10-CM

## 2024-09-23 NOTE — TELEPHONE ENCOUNTER
Refill Request    Medication request: ACETAMINOPHEN-CODEINE 300-30 MG Oral Tab  TAKE 1 TABLET BY MOUTH EVERY 6 HOURS AS NEEDED FOR PAIN     LOV:6/10/2024 Marcelino Chong MD   Due back to clinic per last office note:  She will follow  up after having the above. (Patient is scheduled for MRI on 10/5/2024)     NOV: Visit date not found      [place]: 8/16/2024 #30    Urine drug screen (if applicable): n/a  Pain contract: n/a    LOV plan (if weaning or changing medications): Per Dr. Chong's LOV notes: \"The patient will continue with their current pain medications. \"

## 2024-09-24 ENCOUNTER — TELEPHONE (OUTPATIENT)
Dept: INTERNAL MEDICINE CLINIC | Facility: CLINIC | Age: 79
End: 2024-09-24

## 2024-09-24 DIAGNOSIS — N18.30 STAGE 3 CHRONIC KIDNEY DISEASE, UNSPECIFIED WHETHER STAGE 3A OR 3B CKD (HCC): ICD-10-CM

## 2024-09-24 DIAGNOSIS — E53.8 VITAMIN B 12 DEFICIENCY: Primary | ICD-10-CM

## 2024-09-24 DIAGNOSIS — E78.5 HYPERLIPIDEMIA, UNSPECIFIED HYPERLIPIDEMIA TYPE: ICD-10-CM

## 2024-09-24 DIAGNOSIS — E55.9 VITAMIN D DEFICIENCY: ICD-10-CM

## 2024-09-24 DIAGNOSIS — I10 HYPERTENSION, BENIGN: ICD-10-CM

## 2024-09-24 RX ORDER — ACETAMINOPHEN AND CODEINE PHOSPHATE 300; 30 MG/1; MG/1
1 TABLET ORAL EVERY 6 HOURS PRN
Qty: 30 TABLET | Refills: 0 | Status: SHIPPED | OUTPATIENT
Start: 2024-09-24

## 2024-09-24 NOTE — TELEPHONE ENCOUNTER
Lab order completed, nursing please call patient, let them know to complete the labs 12 hours fasting, to be done 7 days prior to their visit of possible

## 2024-09-24 NOTE — TELEPHONE ENCOUNTER
Patient calling to request blood work prior to:    [] physical    [x] check-up      [] other    Patient uses:  [x] [facility] [] [facility]    [facility]   [facility] location:       Fax #:    Patient prefers to be notified once labs are placed by: [x] phone call  [] my chart message    Message 2 of 2 w/same last name

## 2024-09-28 ENCOUNTER — LAB ENCOUNTER (OUTPATIENT)
Dept: LAB | Age: 79
End: 2024-09-28
Attending: INTERNAL MEDICINE
Payer: MEDICARE

## 2024-09-28 DIAGNOSIS — E53.8 VITAMIN B 12 DEFICIENCY: ICD-10-CM

## 2024-09-28 DIAGNOSIS — I10 HYPERTENSION, BENIGN: ICD-10-CM

## 2024-09-28 DIAGNOSIS — N18.30 STAGE 3 CHRONIC KIDNEY DISEASE, UNSPECIFIED WHETHER STAGE 3A OR 3B CKD (HCC): ICD-10-CM

## 2024-09-28 DIAGNOSIS — E55.9 VITAMIN D DEFICIENCY: ICD-10-CM

## 2024-09-28 DIAGNOSIS — E78.5 HYPERLIPIDEMIA, UNSPECIFIED HYPERLIPIDEMIA TYPE: ICD-10-CM

## 2024-09-28 LAB
ALBUMIN SERPL-MCNC: 4.6 G/DL (ref 3.2–4.8)
ALBUMIN/GLOB SERPL: 1.7 {RATIO} (ref 1–2)
ALP LIVER SERPL-CCNC: 90 U/L
ALT SERPL-CCNC: 18 U/L
ANION GAP SERPL CALC-SCNC: 7 MMOL/L (ref 0–18)
AST SERPL-CCNC: 25 U/L (ref ?–34)
BASOPHILS # BLD AUTO: 0.08 X10(3) UL (ref 0–0.2)
BASOPHILS NFR BLD AUTO: 1 %
BILIRUB SERPL-MCNC: 0.5 MG/DL (ref 0.2–1.1)
BILIRUB UR QL: NEGATIVE
BUN BLD-MCNC: 9 MG/DL (ref 9–23)
BUN/CREAT SERPL: 12.7 (ref 10–20)
CALCIUM BLD-MCNC: 9.7 MG/DL (ref 8.7–10.4)
CHLORIDE SERPL-SCNC: 107 MMOL/L (ref 98–112)
CHOLEST SERPL-MCNC: 136 MG/DL (ref ?–200)
CO2 SERPL-SCNC: 26 MMOL/L (ref 21–32)
COLOR UR: YELLOW
CREAT BLD-MCNC: 0.71 MG/DL
DEPRECATED RDW RBC AUTO: 45.1 FL (ref 35.1–46.3)
EGFRCR SERPLBLD CKD-EPI 2021: 85 ML/MIN/1.73M2 (ref 60–?)
EOSINOPHIL # BLD AUTO: 0.21 X10(3) UL (ref 0–0.7)
EOSINOPHIL NFR BLD AUTO: 2.8 %
ERYTHROCYTE [DISTWIDTH] IN BLOOD BY AUTOMATED COUNT: 14.2 % (ref 11–15)
FASTING PATIENT LIPID ANSWER: YES
FASTING STATUS PATIENT QL REPORTED: YES
GLOBULIN PLAS-MCNC: 2.7 G/DL (ref 2–3.5)
GLUCOSE BLD-MCNC: 118 MG/DL (ref 70–99)
GLUCOSE UR-MCNC: NORMAL MG/DL
HCT VFR BLD AUTO: 43.6 %
HDLC SERPL-MCNC: 39 MG/DL (ref 40–59)
HGB BLD-MCNC: 14 G/DL
HGB UR QL STRIP.AUTO: NEGATIVE
IMM GRANULOCYTES # BLD AUTO: 0.05 X10(3) UL (ref 0–1)
IMM GRANULOCYTES NFR BLD: 0.7 %
KETONES UR-MCNC: NEGATIVE MG/DL
LDLC SERPL CALC-MCNC: 79 MG/DL (ref ?–100)
LEUKOCYTE ESTERASE UR QL STRIP.AUTO: 500
LYMPHOCYTES # BLD AUTO: 2.3 X10(3) UL (ref 1–4)
LYMPHOCYTES NFR BLD AUTO: 30.2 %
MCH RBC QN AUTO: 28 PG (ref 26–34)
MCHC RBC AUTO-ENTMCNC: 32.1 G/DL (ref 31–37)
MCV RBC AUTO: 87.2 FL
MONOCYTES # BLD AUTO: 0.83 X10(3) UL (ref 0.1–1)
MONOCYTES NFR BLD AUTO: 10.9 %
NEUTROPHILS # BLD AUTO: 4.15 X10 (3) UL (ref 1.5–7.7)
NEUTROPHILS # BLD AUTO: 4.15 X10(3) UL (ref 1.5–7.7)
NEUTROPHILS NFR BLD AUTO: 54.4 %
NONHDLC SERPL-MCNC: 97 MG/DL (ref ?–130)
OSMOLALITY SERPL CALC.SUM OF ELEC: 290 MOSM/KG (ref 275–295)
PH UR: 7 [PH] (ref 5–8)
PLATELET # BLD AUTO: 297 10(3)UL (ref 150–450)
POTASSIUM SERPL-SCNC: 4.7 MMOL/L (ref 3.5–5.1)
PROT SERPL-MCNC: 7.3 G/DL (ref 5.7–8.2)
PROT UR-MCNC: NEGATIVE MG/DL
RBC # BLD AUTO: 5 X10(6)UL
SODIUM SERPL-SCNC: 140 MMOL/L (ref 136–145)
SP GR UR STRIP: 1.01 (ref 1–1.03)
TRIGL SERPL-MCNC: 98 MG/DL (ref 30–149)
TSI SER-ACNC: 2.02 MIU/ML (ref 0.55–4.78)
UROBILINOGEN UR STRIP-ACNC: NORMAL
VIT D+METAB SERPL-MCNC: 30.5 NG/ML (ref 30–100)
VLDLC SERPL CALC-MCNC: 15 MG/DL (ref 0–30)
WBC # BLD AUTO: 7.6 X10(3) UL (ref 4–11)
WBC #/AREA URNS AUTO: >50 /HPF

## 2024-09-28 PROCEDURE — 36415 COLL VENOUS BLD VENIPUNCTURE: CPT

## 2024-09-28 PROCEDURE — 87086 URINE CULTURE/COLONY COUNT: CPT

## 2024-09-28 PROCEDURE — 80053 COMPREHEN METABOLIC PANEL: CPT

## 2024-09-28 PROCEDURE — 80061 LIPID PANEL: CPT

## 2024-09-28 PROCEDURE — 85025 COMPLETE CBC W/AUTO DIFF WBC: CPT

## 2024-09-28 PROCEDURE — 81001 URINALYSIS AUTO W/SCOPE: CPT

## 2024-09-28 PROCEDURE — 87186 SC STD MICRODIL/AGAR DIL: CPT

## 2024-09-28 PROCEDURE — 82306 VITAMIN D 25 HYDROXY: CPT

## 2024-09-28 PROCEDURE — 84443 ASSAY THYROID STIM HORMONE: CPT

## 2024-09-28 PROCEDURE — 87077 CULTURE AEROBIC IDENTIFY: CPT

## 2024-10-04 ENCOUNTER — OFFICE VISIT (OUTPATIENT)
Dept: INTERNAL MEDICINE CLINIC | Facility: CLINIC | Age: 79
End: 2024-10-04

## 2024-10-04 VITALS
OXYGEN SATURATION: 97 % | DIASTOLIC BLOOD PRESSURE: 82 MMHG | HEIGHT: 60 IN | SYSTOLIC BLOOD PRESSURE: 126 MMHG | RESPIRATION RATE: 16 BRPM | BODY MASS INDEX: 42.21 KG/M2 | TEMPERATURE: 98 F | WEIGHT: 215 LBS | HEART RATE: 67 BPM

## 2024-10-04 DIAGNOSIS — M48.061 LUMBAR FORAMINAL STENOSIS: ICD-10-CM

## 2024-10-04 DIAGNOSIS — N18.31 STAGE 3A CHRONIC KIDNEY DISEASE (HCC): Chronic | ICD-10-CM

## 2024-10-04 DIAGNOSIS — M43.16 SPONDYLOLISTHESIS OF LUMBAR REGION: ICD-10-CM

## 2024-10-04 DIAGNOSIS — G56.03 BILATERAL CARPAL TUNNEL SYNDROME: ICD-10-CM

## 2024-10-04 DIAGNOSIS — G89.29 CHRONIC NECK PAIN: ICD-10-CM

## 2024-10-04 DIAGNOSIS — M54.16 LUMBAR RADICULOPATHY: ICD-10-CM

## 2024-10-04 DIAGNOSIS — G60.8 SENSORY PERIPHERAL NEUROPATHY: ICD-10-CM

## 2024-10-04 DIAGNOSIS — M96.1 LUMBAR POST-LAMINECTOMY SYNDROME: ICD-10-CM

## 2024-10-04 DIAGNOSIS — J41.0 SMOKERS' COUGH (HCC): Chronic | ICD-10-CM

## 2024-10-04 DIAGNOSIS — M25.562 CHRONIC PAIN OF LEFT KNEE: ICD-10-CM

## 2024-10-04 DIAGNOSIS — M51.26 LUMBAR HERNIATED DISC: ICD-10-CM

## 2024-10-04 DIAGNOSIS — G89.29 CHRONIC RIGHT SHOULDER PAIN: ICD-10-CM

## 2024-10-04 DIAGNOSIS — M25.511 CHRONIC RIGHT SHOULDER PAIN: ICD-10-CM

## 2024-10-04 DIAGNOSIS — M54.42 CHRONIC LEFT-SIDED LOW BACK PAIN WITH LEFT-SIDED SCIATICA: ICD-10-CM

## 2024-10-04 DIAGNOSIS — Z00.00 ENCOUNTER FOR ANNUAL HEALTH EXAMINATION: Primary | ICD-10-CM

## 2024-10-04 DIAGNOSIS — I10 BENIGN HYPERTENSION: ICD-10-CM

## 2024-10-04 DIAGNOSIS — B96.20 E-COLI UTI: ICD-10-CM

## 2024-10-04 DIAGNOSIS — M54.2 CHRONIC NECK PAIN: ICD-10-CM

## 2024-10-04 DIAGNOSIS — M81.0 AGE-RELATED OSTEOPOROSIS WITHOUT CURRENT PATHOLOGICAL FRACTURE: ICD-10-CM

## 2024-10-04 DIAGNOSIS — M54.12 RIGHT CERVICAL RADICULOPATHY: ICD-10-CM

## 2024-10-04 DIAGNOSIS — Z86.16 HISTORY OF COVID-19: ICD-10-CM

## 2024-10-04 DIAGNOSIS — N39.0 E-COLI UTI: ICD-10-CM

## 2024-10-04 DIAGNOSIS — G62.9 PERIPHERAL POLYNEUROPATHY: ICD-10-CM

## 2024-10-04 DIAGNOSIS — G89.29 CHRONIC PAIN OF LEFT KNEE: ICD-10-CM

## 2024-10-04 DIAGNOSIS — R73.01 IMPAIRED FASTING GLUCOSE: ICD-10-CM

## 2024-10-04 DIAGNOSIS — E78.5 HYPERLIPIDEMIA, UNSPECIFIED HYPERLIPIDEMIA TYPE: ICD-10-CM

## 2024-10-04 DIAGNOSIS — E66.01 SEVERE OBESITY (BMI >= 40) (HCC): Chronic | ICD-10-CM

## 2024-10-04 DIAGNOSIS — G89.29 CHRONIC LEFT-SIDED LOW BACK PAIN WITH LEFT-SIDED SCIATICA: ICD-10-CM

## 2024-10-04 DIAGNOSIS — Z23 FLU VACCINE NEED: ICD-10-CM

## 2024-10-04 DIAGNOSIS — M51.9 LUMBAR DISC DISEASE: ICD-10-CM

## 2024-10-04 DIAGNOSIS — E53.8 B12 DEFICIENCY: ICD-10-CM

## 2024-10-04 DIAGNOSIS — M99.08 RIB CAGE DYSFUNCTION: ICD-10-CM

## 2024-10-04 DIAGNOSIS — M19.011 PRIMARY OSTEOARTHRITIS OF RIGHT SHOULDER: ICD-10-CM

## 2024-10-04 PROBLEM — S46.011A TRAUMATIC COMPLETE TEAR OF RIGHT ROTATOR CUFF: Status: RESOLVED | Noted: 2024-04-08 | Resolved: 2024-10-04

## 2024-10-04 PROBLEM — Z96.652 HISTORY OF LEFT KNEE REPLACEMENT: Status: RESOLVED | Noted: 2023-02-02 | Resolved: 2024-10-04

## 2024-10-04 RX ORDER — CYANOCOBALAMIN 1000 UG/ML
1000 INJECTION, SOLUTION INTRAMUSCULAR; SUBCUTANEOUS ONCE
Status: COMPLETED | OUTPATIENT
Start: 2024-10-04 | End: 2024-10-04

## 2024-10-04 RX ORDER — METOPROLOL SUCCINATE 25 MG/1
25 TABLET, EXTENDED RELEASE ORAL DAILY
Qty: 90 TABLET | Refills: 1 | Status: SHIPPED | OUTPATIENT
Start: 2024-10-04

## 2024-10-04 RX ORDER — MAGNESIUM 200 MG
TABLET ORAL
COMMUNITY

## 2024-10-04 RX ORDER — ALENDRONATE SODIUM 35 MG/1
35 TABLET ORAL WEEKLY
Qty: 13 TABLET | Refills: 3 | Status: SHIPPED | OUTPATIENT
Start: 2024-10-04

## 2024-10-04 RX ORDER — NITROFURANTOIN 25; 75 MG/1; MG/1
100 CAPSULE ORAL 2 TIMES DAILY
Qty: 14 CAPSULE | Refills: 0 | Status: SHIPPED | OUTPATIENT
Start: 2024-10-04

## 2024-10-04 RX ADMIN — CYANOCOBALAMIN 1000 MCG: 1000 INJECTION, SOLUTION INTRAMUSCULAR; SUBCUTANEOUS at 12:32:00

## 2024-10-04 NOTE — PROGRESS NOTES
Subjective:   Fidelia Villar is a 79 year old female who presents for a MA AHA (Medicare Advantage Annual Health Assessment) and Subsequent Annual Wellness visit (Pt already had Initial Annual Wellness) and scheduled follow up of multiple significant but stable problems.   Chief Complaint   Patient presents with    Physical     MEDICARE ANNUAL, reports pain to back, right shoulder, and left knee.  STATUS POST 9/6/24 Stent to Prox and Distal RCA   Patient presents for a Medicare Subsequent Annual Wellness visit and  followup regarding chronic medical problems and/or refills as listed below in the assessment and plan    Patient is here today for physical exam, patient is up-to-date with age-related standard of care screening and vaccination recommendations, this was discussed at length and they verbalized understanding of any deficiencies.    Breast and thoracic pain: Patient continues to have multiple areas of pain, normally sees a physiatrist, and has a family member that works for the physiatry team.  She seems stable, but continues to have right sided tight breast pain, right rib cage pain, and does appear to be directly linked to her thoracic spine which is advancing and kyphosis over the years.  She claims she has tried physical therapy that she thought was not very helpful, she claims there was a time when a physical chiropractor came to the house and worked on her, she did claim that was helpful.  She has not pursued outpatient chiropractics yet.   is with her today.    Coronary artery disease: Patient does have follow-up with her cardiologist, seems stable, doing well, compliant with medications.      History/Other:   Fall Risk Assessment:   She has been screened for Falls and is High Risk. Fall Prevention information provided to patient in After Visit Summary.    Do you feel unsteady when standing or walking?: Yes  Do you worry about falling?: Yes  Have you fallen in the past year?: Yes  How many  times have you fallen?: 4  Were you injured?: No     Cognitive Assessment:   She had a completely normal cognitive assessment - see flowsheet entries     Functional Ability/Status:   Fidelia Villar has some abnormal functions as listed below:  She has Vision problems based on screening of functional status. She has Walking problems based on screening of functional status. She has problems with Daily Activities based on screening of functional status. She has problems with Memory based on screening of functional status.       Depression Screening (PHQ):  PHQ-2 SCORE: 1  , done 10/4/2024   Feeling down, depressed, or hopeless: 1    Trouble falling or staying asleep, or sleeping too much: 1     Feeling bad about yourself - or that you are a failure or have let yourself or your family down: 1    If you checked off any problems, how difficult have these problems made it for you to do your work, take care of things at home, or get along with other people?: Somewhat difficult    Last [place] Suicide Screening on 10/4/2024 was No Risk.          Advanced Directives:   She does NOT have a Living Will. [ ]  She does NOT have a Power of  for Health Care. [ ]  Discussed Advance Care Planning with patient (and family/surrogate if present). Standard forms made available to patient in After Visit Summary.      Patient Active Problem List   Diagnosis    Age-related osteoporosis without current pathological fracture    Carpal tunnel syndrome    Hyperlipidemia    History of COVID-19    Chronic pain of left knee with intact prosthesis    Chronic left-sided low back pain with left-sided sciatica    Lumbar post-laminectomy syndrome: L1-S1    left L4-5-S1 chronic and right S1 chronic radiculopathy    L5-S1 grade 1 unstable, L4-5 grade 1 stable spondylolisthesis    L4-5 bilateral mod-severe, L3-4 left mod-severe/right severe, L2-3 left mod/right mod-severe foraminal stenosis    L1-2 mild diffuse & bilateral mod far lat, L2-3 right  mod far lat & mild diffuse, L3-4 mod-large diffuse & left large far lat, L4-5 mild diffuse & left mod far lat, L5-S1 lt mild galilea bulging discs    L4-5 mild central herniated disc    Sensory peripheral neuropathy: axonal    Peripheral polyneuropathy    B12 deficiency    Benign hypertension    Impaired fasting glucose    Severe obesity (BMI >= 40) ([facility])    CKD (chronic kidney disease) stage 3, GFR 30-59 ml/min ([facility])    Smokers' cough ([facility])    Chronic right shoulder pain & s/p right rotator cuff repair x2    Primary osteoarthritis of right shoulder: moderate glenohumeral and AC joint    Chronic neck pain    Right C5-6 subacute & right C8 chronic radiculopathy     Allergies:  She is allergic to iodine (topical) and iodine i 131 tositumomab.    Current Medications:  Outpatient Medications Marked as Taking for the 10/4/24 encounter (Office Visit) with D'Amico, Jeff Anthony,    Medication Sig    Magnesium 200 MG Oral Tab Take by mouth.    vitamin E 200 UNITS Oral Cap Take 1 capsule (200 Units total) by mouth daily.    alendronate 35 MG Oral Tab Take 1 tablet (35 mg total) by mouth once a week. 30 MINUTES BEFORE THE FIRST FOOD BEVERAGE OR MEDICINE OF THE DAY WITH PLAIN WATER    metoprolol succinate ER 25 MG Oral Tablet 24 Hr Take 1 tablet (25 mg total) by mouth daily.    nitrofurantoin monohydrate macro 100 MG Oral Cap Take 1 capsule (100 mg total) by mouth 2 (two) times daily.    ACETAMINOPHEN-CODEINE 300-30 MG Oral Tab TAKE 1 TABLET BY MOUTH EVERY 6 HOURS AS NEEDED FOR PAIN    clopidogrel 75 MG Oral Tab Take 1 tablet (75 mg total) by mouth daily.    atorvastatin 80 MG Oral Tab Take 1 tablet (80 mg total) by mouth nightly.    DULoxetine 30 MG Oral Cap DR Particles Take 1 capsule (30 mg total) by mouth daily.    DULoxetine 60 MG Oral Cap DR Particles Take 1 capsule (60 mg total) by mouth every evening.    methocarbamol 750 MG Oral Tab Take 1 tablet (750 mg total) by mouth 3 (three) times daily.    gabapentin 400 MG Oral  Cap Take 2 capsules (800 mg total) by mouth 2 (two) times daily.    Calcium Carb-Cholecalciferol (CALCIUM 600/VITAMIN D3) 600-20 MG-MCG Oral Tab Take 1 tablet by mouth in the morning and 1 tablet before bedtime.    latanoprost 0.005 % Ophthalmic Solution Place 1 drop into both eyes daily.    aspirin 81 MG Oral Chew Tab Chew 1 tablet (81 mg total) by mouth daily.    acetaminophen 325 MG Oral Tab Take 500 mg by mouth 3 (three) times daily.       Medical History:  She  has a past medical history of Atherosclerosis of coronary artery, Cataract, Coronary atherosclerosis, High cholesterol, HYPERLIPIDEMIA, OSTEOARTHRITIS, Osteoarthritis, OSTEOPOROSIS, and Visual impairment.  Surgical History:  She  has a past surgical history that includes appendectomy; cataract; colonoscopy (02/2009); other surgical history; other surgical history; cath percutaneous  transluminal coronary angioplasty; cath bare metal stent (bms); and coronary stent placement (09/06/2024).   Family History:  Her family history includes Breast Cancer (age of onset: 73) in her sister; Diabetes in her brother; Heart Disease in her father; Heart Disorder in her father; Other in her mother.  Social History:  She  reports that she has been smoking cigarettes. She has a 22.5 pack-year smoking history. She has been exposed to tobacco smoke. She has never used smokeless tobacco. She reports that she does not drink alcohol and does not use drugs.    Tobacco:  Social History     Tobacco Use   Smoking Status Some Days    Current packs/day: 0.75    Average packs/day: 0.8 packs/day for 30.0 years (22.5 ttl pk-yrs)    Types: Cigarettes    Passive exposure: Current   Smokeless Tobacco Never     E-Cigarettes/Vaping       Questions Responses    E-Cigarette Use Never User          E-Cigarette/Vaping Substances       Questions Responses    Nicotine No    THC No    CBD No    Flavoring No          E-Cigarette/Vaping Devices       Questions Responses    Disposable No     Pre-filled or Refillable Cartridge No    Refillable Tank No    Pre-filled Pod No           Tobacco cessation counseling for <3 minutes.      CAGE Alcohol Screen:   CAGE screening score of 0 on 10/4/2024, showing low risk of alcohol abuse.      Patient Care Team:  D'Amico, Jeff Anthony, DO as PCP - General (Internal Medicine)    Review of Systems  Constitutional: Negative for Chills, fatigue, fever, malaise, weight gain and weight loss.  ENMT: Negative for Nasal drainage and sinus pressure.  Eyes: Negative for Vision changes.  Respiratory: Negative for Cough, dyspnea and wheezing.  Cardio: Negative Chest pain and irregular heartbeat/palpitations.  GI: Negative for Abdominal pain, constipation, diarrhea, heartburn, nausea and vomiting.  : Negative for Dysuria and urinary frequency.  Endocrine: Negative for Cold intolerance and heat intolerance.  Neuro: Negative for Gait disturbance and memory impairment.  Psych: Negative for Anxiety and depression.  Integumentary: Negative for Hives and rash.  MS: Negative muscle weakness. pos for joint pain  Hema/Lymph: Negative Easy bleeding and easy bruising.  Allergic/Immuno: Negative Environmental allergies and food allergies.    Objective:   Physical Exam  PHYSICAL EXAMINATION:  Vital signs: See chart   Gen. exam: Alert and oriented, in no acute distress   HEENT: Pupils equal and reactive to light and accommodation, moist mucous membranes  Neck exam:  Supple with baseline range of motion.  Normal thyroid trachea midline, no JVD  Heart exam: Regular rate and rhythm no murmurs no S3 no S4   Lung exam: No rales no rhonchi no wheezes  Abdominal exam: Soft nontender, nondistended positive bowel sounds are normoactive, morbidly obese abdomen  Extremities exam: no clubbing no cyanosis no edema  Skin exam: see wound notes for details, no rashes  Neurological exam: Cranial nerves II through XII intact, no gross deficits  Musculoskeletal exam: Moderate bilateral generalized hand  arthritis appreciated, advanced kyphoscoliosis  : deferred to urology    /82   Pulse 67   Temp 97.9 °F (36.6 °C) (Oral)   Resp 16   Ht 5' (1.524 m)   Wt 215 lb (97.5 kg)   SpO2 97%   BMI 41.99 kg/m²  Estimated body mass index is 41.99 kg/m² as calculated from the following:    Height as of this encounter: 5' (1.524 m).    Weight as of this encounter: 215 lb (97.5 kg).    Medicare Hearing Assessment:   Hearing Screening    Time taken: 10/4/2024 11:10 AM  Screening Method: Whisper Test  Whisper Test Result: Pass         Visual Acuity:   Right Eye Visual Acuity: Corrected Right Eye Chart Acuity: 20/40   Left Eye Visual Acuity: Corrected Left Eye Chart Acuity: 20/40   Both Eyes Visual Acuity: Corrected Both Eyes Chart Acuity: 20/30   Able To Tolerate Visual Acuity: Yes        Assessment & Plan:   Fidelia Villar is a 79 year old female who presents for a Medicare Assessment.     1. Encounter for annual health examination (Primary)  2. Benign hypertension  -     Metoprolol Succinate ER; Take 1 tablet (25 mg total) by mouth daily.  Dispense: 90 tablet; Refill: 1  -     Detailed, Mod Complex (38976)  3. Flu vaccine need  -     High Dose Fluzone trivalent influenza, 65yrs+ PFS (08308)  -     Detailed, Mod Complex (91359)  4. Rib cage dysfunction  -     Detailed, Mod Complex (12591)  5. Hyperlipidemia, unspecified hyperlipidemia type  -     Detailed, Mod Complex (36042)  6. Primary osteoarthritis of right shoulder: moderate glenohumeral and AC joint  -     Detailed, Mod Complex (27131)  7. Severe obesity (BMI >= 40) ([facility])  -     Detailed, Mod Complex (43667)  8. Smokers' cough ([facility])  -     Detailed, Mod Complex (19497)  9. Sensory peripheral neuropathy: axonal  -     Detailed, Mod Complex (46998)  10. Right C5-6 subacute & right C8 chronic radiculopathy  -     Detailed, Mod Complex (93741)  11. Impaired fasting glucose  -     Detailed, Mod Complex (30019)  12. Stage 3a chronic kidney disease (HCC)  -      Detailed, Mod Complex (99214)  13. Chronic pain of left knee with intact prosthesis  -     Detailed, Mod Complex (99214)  14. History of COVID-19  -     Detailed, Mod Complex (99214)  15. Peripheral polyneuropathy  -     Detailed, Mod Complex (99214)  16. Lumbar post-laminectomy syndrome: L1-S1  -     Detailed, Mod Complex (99214)  17. B12 deficiency  -     Cyanocobalamin  -     Detailed, Mod Complex (99214)  18. Age-related osteoporosis without current pathological fracture  -     Detailed, Mod Complex (99214)  19. Bilateral carpal tunnel syndrome  -     Detailed, Mod Complex (99214)  20. Chronic left-sided low back pain with left-sided sciatica  -     Detailed, Mod Complex (99214)  21. Chronic neck pain  -     Detailed, Mod Complex (99214)  22. Chronic right shoulder pain & s/p right rotator cuff repair x2  -     Detailed, Mod Complex (99214)  23. L1-2 mild diffuse & bilateral mod far lat, L2-3 right mod far lat & mild diffuse, L3-4 mod-large diffuse & left large far lat, L4-5 mild diffuse & left mod far lat, L5-S1 lt mild galilea bulging discs  -     Detailed, Mod Complex (99214)  24. L4-5 bilateral mod-severe, L3-4 left mod-severe/right severe, L2-3 left mod/right mod-severe foraminal stenosis  -     Detailed, Mod Complex (99214)  25. L4-5 mild central herniated disc  -     Detailed, Mod Complex (99214)  26. L5-S1 grade 1 unstable, L4-5 grade 1 stable spondylolisthesis  -     Detailed, Mod Complex (99214)  27. left L4-5-S1 chronic and right S1 chronic radiculopathy  -     Detailed, Mod Complex (99214)  28. E-coli UTI  -     Nitrofurantoin Monohyd Macro; Take 1 capsule (100 mg total) by mouth 2 (two) times daily.  Dispense: 14 capsule; Refill: 0  -     Detailed, Mod Complex (45800)  Other orders  -     Alendronate Sodium; Take 1 tablet (35 mg total) by mouth once a week. 30 MINUTES BEFORE THE FIRST FOOD BEVERAGE OR MEDICINE OF THE DAY WITH PLAIN WATER  Dispense: 13 tablet; Refill: 3  1. Encounter for annual health  examination  Physical exam instruction: Improve diet and exercise, lets us the phy therapy/chiropractics    2. Benign hypertension  Stable cont monitoring and management  - metoprolol succinate ER 25 MG Oral Tablet 24 Hr; Take 1 tablet (25 mg total) by mouth daily.  Dispense: 90 tablet; Refill: 1  - Detailed, Mod Complex (14260)    3. Flu vaccine need  Flu shot along with a B12 shot today, if this does help with your energy levels or neuropathy, let me know  - High Dose Fluzone trivalent influenza, 65yrs+ PFS (94702)  - Detailed, Mod Complex (31870)    4. Rib cage dysfunction  I do think we need to use a more physical therapy intense chiropractic type therapy to help with this, either [facility], or [facility]  - Detailed, Mod Complex (15541)    5. Hyperlipidemia, unspecified hyperlipidemia type  Stable continue current monitoring and management  - Detailed, Mod Complex (03325)    6. Primary osteoarthritis of right shoulder: moderate glenohumeral and AC joint  Stable continue current monitoring and management  - Detailed, Mod Complex (72273)    7. Severe obesity (BMI >= 40) ([facility])  Stable continue current monitoring and management  - Detailed, Mod Complex (14065)    8. Smokers' cough ([facility])  Stable continue current monitoring and management  - Detailed, Mod Complex (47270)    9. Sensory peripheral neuropathy: axonal  Stable continue current monitoring and management  - Detailed, Mod Complex (03850)    10. Right C5-6 subacute & right C8 chronic radiculopathy  Stable continue current monitoring and management  - Detailed, Mod Complex (74058)    11. Impaired fasting glucose  Stable continue current monitoring and management  - Detailed, Mod Complex (06527)    12. Stage 3a chronic kidney disease (HCC)  Stable continue current monitoring and management  - Detailed, Mod Complex (34629)    13. Chronic pain of left knee with intact prosthesis  Stable continue current monitoring and management  - Detailed,  Mod Complex (53584)    14. History of COVID-19  Stable continue current monitoring and management  - Detailed, Mod Complex (64978)    15. Peripheral polyneuropathy  Stable continue current monitoring and management  - Detailed, Mod Complex (22098)    16. Lumbar post-laminectomy syndrome: L1-S1  Stable continue current monitoring and management  - Detailed, Mod Complex (23594)    17. B12 deficiency  Stable continue current monitoring and management  - cyanocobalamin (Vitamin B12) 1000 MCG/ML injection 1,000 mcg  - Detailed, Mod Complex (76486)    18. Age-related osteoporosis without current pathological fracture  Stable continue current monitoring and management  - Detailed, Mod Complex (12147)    19. Bilateral carpal tunnel syndrome  Stable continue current monitoring and management  - Detailed, Mod Complex (73645)    20. Chronic left-sided low back pain with left-sided sciatica  Stable continue current monitoring and management  - Detailed, Mod Complex (02856)    21. Chronic neck pain  Stable continue current monitoring and management  - Detailed, Mod Complex (91757)    22. Chronic right shoulder pain & s/p right rotator cuff repair x2  Stable continue current monitoring and management  - Detailed, Mod Complex (46248)    23. L1-2 mild diffuse & bilateral mod far lat, L2-3 right mod far lat & mild diffuse, L3-4 mod-large diffuse & left large far lat, L4-5 mild diffuse & left mod far lat, L5-S1 lt mild galilea bulging discs  Stable continue current monitoring and management  - Detailed, Mod Complex (52344)    24. L4-5 bilateral mod-severe, L3-4 left mod-severe/right severe, L2-3 left mod/right mod-severe foraminal stenosis  Stable continue current monitoring and management  - Detailed, Mod Complex (82074)    25. L4-5 mild central herniated disc  Stable continue current monitoring and management  - Detailed, Mod Complex (27753)    26. L5-S1 grade 1 unstable, L4-5 grade 1 stable spondylolisthesis  Stable continue current  monitoring and management  - Detailed, Mod Complex (42577)    27. left L4-5-S1 chronic and right S1 chronic radiculopathy  Stable continue current monitoring and management  - Detailed, Mod Complex (56613)    28. E-coli UTI  Stable, lets try to clear the bacteria here with twice daily antibiotics here, take this with food if we can  - nitrofurantoin monohydrate macro 100 MG Oral Cap; Take 1 capsule (100 mg total) by mouth 2 (two) times daily.  Dispense: 14 capsule; Refill: 0  - Detailed, Mod Complex (85447)    -For the vaccines, you have been given a flu shot today, lets take a pneumonia vaccine and a RSV vaccine together in 2 weeks at [facility], try to set this up, or show this paperwork to the pharmacy staff    The patient indicates understanding of these issues and agrees to the plan.  Reinforced healthy diet, lifestyle, and exercise.      No follow-ups on file.     Jeff Anthony D'Amico, DO, 10/4/2024     Supplementary Documentation:   General Health:  In the past six months, have you lost more than 10 pounds without trying?: 2 - No  Has your appetite been poor?: No  Type of Diet: Balanced  How does the patient maintain a good energy level?: Other  How would you describe your daily physical activity?: Light  How would you describe your current health state?: Poor  How do you maintain positive mental well-being?: Social Interaction;Visiting Family  On a scale of 0 to 10, with 0 being no pain and 10 being severe pain, what is your pain level?: 10 - (Severe)  In the past six months, have you experienced urine leakage?: 1-Yes  At any time do you feel concerned for the safety/well-being of yourself and/or your children, in your home or elsewhere?: No  Have you had any immunizations at another office such as Influenza, Hepatitis B, Tetanus, or Pneumococcal?: Yes    Health Maintenance   Topic Date Due    Zoster Vaccines (2 of 2) 06/15/2023    [place] Annual Health Assessment  01/01/2024    Tobacco Cessation Counseling   01/01/2024    COVID-19 Vaccine (4 - 2023-24 season) 09/01/2024    Influenza Vaccine  Completed    DEXA Scan  Completed    Annual Depression Screening  Completed    Fall Risk Screening (Annual)  Completed    Pneumococcal Vaccine: 65+ Years  Completed

## 2024-10-04 NOTE — PATIENT INSTRUCTIONS
1. Encounter for annual health examination  Physical exam instruction: Improve diet and exercise, lets us the phy therapy/chiropractics    2. Benign hypertension  Stable cont monitoring and management  - metoprolol succinate ER 25 MG Oral Tablet 24 Hr; Take 1 tablet (25 mg total) by mouth daily.  Dispense: 90 tablet; Refill: 1  - Detailed, Mod Complex (81357)    3. Flu vaccine need  Flu shot along with a B12 shot today, if this does help with your energy levels or neuropathy, let me know  - High Dose Fluzone trivalent influenza, 65yrs+ PFS (00028)  - Detailed, Mod Complex (03232)    4. Rib cage dysfunction  I do think we need to use a more physical therapy intense chiropractic type therapy to help with this, either [facility], or [facility]  - Detailed, Mod Complex (15581)    5. Hyperlipidemia, unspecified hyperlipidemia type  Stable continue current monitoring and management  - Detailed, Mod Complex (98400)    6. Primary osteoarthritis of right shoulder: moderate glenohumeral and AC joint  Stable continue current monitoring and management  - Detailed, Mod Complex (77617)    7. Severe obesity (BMI >= 40) (HCC)  Stable continue current monitoring and management  - Detailed, Mod Complex (18960)    8. Smokers' cough (HCC)  Stable continue current monitoring and management  - Detailed, Mod Complex (71799)    9. Sensory peripheral neuropathy: axonal  Stable continue current monitoring and management  - Detailed, Mod Complex (41260)    10. Right C5-6 subacute & right C8 chronic radiculopathy  Stable continue current monitoring and management  - Detailed, Mod Complex (72722)    11. Impaired fasting glucose  Stable continue current monitoring and management  - Detailed, Mod Complex (61131)    12. Stage 3a chronic kidney disease (HCC)  Stable continue current monitoring and management  - Detailed, Mod Complex (99377)    13. Chronic pain of left knee with intact prosthesis  Stable continue current  monitoring and management  - Detailed, Mod Complex (85366)    14. History of COVID-19  Stable continue current monitoring and management  - Detailed, Mod Complex (27355)    15. Peripheral polyneuropathy  Stable continue current monitoring and management  - Detailed, Mod Complex (56644)    16. Lumbar post-laminectomy syndrome: L1-S1  Stable continue current monitoring and management  - Detailed, Mod Complex (11540)    17. B12 deficiency  Stable continue current monitoring and management  - cyanocobalamin (Vitamin B12) 1000 MCG/ML injection 1,000 mcg  - Detailed, Mod Complex (61038)    18. Age-related osteoporosis without current pathological fracture  Stable continue current monitoring and management  - Detailed, Mod Complex (94890)    19. Bilateral carpal tunnel syndrome  Stable continue current monitoring and management  - Detailed, Mod Complex (88252)    20. Chronic left-sided low back pain with left-sided sciatica  Stable continue current monitoring and management  - Detailed, Mod Complex (61679)    21. Chronic neck pain  Stable continue current monitoring and management  - Detailed, Mod Complex (08834)    22. Chronic right shoulder pain & s/p right rotator cuff repair x2  Stable continue current monitoring and management  - Detailed, Mod Complex (56890)    23. L1-2 mild diffuse & bilateral mod far lat, L2-3 right mod far lat & mild diffuse, L3-4 mod-large diffuse & left large far lat, L4-5 mild diffuse & left mod far lat, L5-S1 lt mild galilea bulging discs  Stable continue current monitoring and management  - Detailed, Mod Complex (58629)    24. L4-5 bilateral mod-severe, L3-4 left mod-severe/right severe, L2-3 left mod/right mod-severe foraminal stenosis  Stable continue current monitoring and management  - Detailed, Mod Complex (32675)    25. L4-5 mild central herniated disc  Stable continue current monitoring and management  - Detailed, Mod Complex (19391)    26. L5-S1 grade 1 unstable, L4-5 grade 1 stable  spondylolisthesis  Stable continue current monitoring and management  - Detailed, Mod Complex (15096)    27. left L4-5-S1 chronic and right S1 chronic radiculopathy  Stable continue current monitoring and management  - Detailed, Mod Complex (71864)    28. E-coli UTI  Stable, lets try to clear the bacteria here with twice daily antibiotics here, take this with food if we can  - nitrofurantoin monohydrate macro 100 MG Oral Cap; Take 1 capsule (100 mg total) by mouth 2 (two) times daily.  Dispense: 14 capsule; Refill: 0  - Detailed, Mod Complex (23434)    -For the vaccines, you have been given a flu shot today, lets take a pneumonia vaccine and a RSV vaccine together in 2 weeks at [facility], try to set this up, or show this paperwork to the pharmacy staff

## 2024-10-05 ENCOUNTER — HOSPITAL ENCOUNTER (OUTPATIENT)
Dept: MRI IMAGING | Facility: HOSPITAL | Age: 79
Discharge: HOME | End: 2024-10-05
Attending: PHYSICAL MEDICINE & REHABILITATION
Payer: MEDICARE

## 2024-10-05 DIAGNOSIS — M54.12 RIGHT CERVICAL RADICULOPATHY: ICD-10-CM

## 2024-10-05 PROCEDURE — 72141 MRI NECK SPINE W/O DYE: CPT | Performed by: PHYSICAL MEDICINE & REHABILITATION

## 2024-10-08 ENCOUNTER — TELEPHONE (OUTPATIENT)
Dept: PHYSICAL MEDICINE AND REHAB | Facility: CLINIC | Age: 79
End: 2024-10-08

## 2024-10-08 DIAGNOSIS — G89.29 CHRONIC NECK PAIN: ICD-10-CM

## 2024-10-08 DIAGNOSIS — M50.90 CERVICAL DISC DISEASE: ICD-10-CM

## 2024-10-08 DIAGNOSIS — G89.29 CHRONIC RIGHT SHOULDER PAIN: ICD-10-CM

## 2024-10-08 DIAGNOSIS — M25.511 CHRONIC RIGHT SHOULDER PAIN: ICD-10-CM

## 2024-10-08 DIAGNOSIS — M19.011 PRIMARY OSTEOARTHRITIS OF RIGHT SHOULDER: ICD-10-CM

## 2024-10-08 DIAGNOSIS — G60.8 SENSORY PERIPHERAL NEUROPATHY: ICD-10-CM

## 2024-10-08 DIAGNOSIS — M54.12 RIGHT CERVICAL RADICULOPATHY: Primary | ICD-10-CM

## 2024-10-08 DIAGNOSIS — M48.02 CERVICAL STENOSIS OF SPINE: ICD-10-CM

## 2024-10-08 DIAGNOSIS — M54.2 CHRONIC NECK PAIN: ICD-10-CM

## 2024-10-08 NOTE — TELEPHONE ENCOUNTER
Patient had MRI performed on 10/05/2024.     Patient was provided appointment for follow up now that MRI has been completed. Patient has appointment on 11/20/2024 at 10:30 am.    Routed to Dr. Chong for MRI review.

## 2024-10-09 ENCOUNTER — OFFICE VISIT (OUTPATIENT)
Dept: NEUROLOGY | Facility: CLINIC | Age: 79
End: 2024-10-09
Payer: MEDICARE

## 2024-10-09 VITALS — SYSTOLIC BLOOD PRESSURE: 108 MMHG | OXYGEN SATURATION: 92 % | DIASTOLIC BLOOD PRESSURE: 64 MMHG | HEART RATE: 79 BPM

## 2024-10-09 DIAGNOSIS — G44.86 CERVICOGENIC HEADACHE: Primary | ICD-10-CM

## 2024-10-09 DIAGNOSIS — G62.9 NEUROPATHY: ICD-10-CM

## 2024-10-09 DIAGNOSIS — M54.16 LUMBAR RADICULOPATHY: ICD-10-CM

## 2024-10-09 DIAGNOSIS — G25.3 MYOCLONUS: ICD-10-CM

## 2024-10-09 PROCEDURE — 3078F DIAST BP <80 MM HG: CPT | Performed by: OTHER

## 2024-10-09 PROCEDURE — 1159F MED LIST DOCD IN RCRD: CPT | Performed by: OTHER

## 2024-10-09 PROCEDURE — 99214 OFFICE O/P EST MOD 30 MIN: CPT | Performed by: OTHER

## 2024-10-09 PROCEDURE — 1160F RVW MEDS BY RX/DR IN RCRD: CPT | Performed by: OTHER

## 2024-10-09 PROCEDURE — 3074F SYST BP LT 130 MM HG: CPT | Performed by: OTHER

## 2024-10-09 NOTE — PROGRESS NOTES
[facility]  [address], [address]  [place] [id number] [phone]          Established  Follow Up Visit       Date: October 9, 2024  Patient Name: Fidelia Villar   MRN: [id number]  Primary physician: Kacey Montiel  [facility] [id number]    Interval History:   --She is complaining of cervicogenic headaches - in her occiput, frontal aspect of her head, had a painful spasm in her left underarm.  I asked her if she wanted to use a Czech  phone but she declined multiple times with myself and our MA, instead she wanted our office to call her daughter in law and discuss our recommendations.     OUTPATIENT MEDICATIONS  Medications Ordered Prior to Encounter[1]      PHYSICAL EXAM:     /64 (BP Location: Right arm, Patient Position: Sitting, Cuff Size: large)   Pulse 79   SpO2 92%   General appearance: Well appearing, and in no acute distress  Skin: skin color, texture normal.  No rashes or lesions.    Head: Normocephalic, atraumatic.    Neurological exam:    Mental Status:   Attention/Concentration: intact attention on bedside test   Fund of knowledge: intact  Speech: no dysarthria or aphasia     LABS/DATA:  Reviewed CBC and CMP, TSH      IMAGING:  Patient had an MRI of her cervical spine done 10/5/2024 that showed mild multilevel spondylosis  I PERSONALLY REVIEWED THESE IMAGES.     ASSESSMENT:  The patient is a 79 year old woman with past medical history of sensory polyneuropathy, multilevel lumbar spondylosis with L4-S1 left-sided and right S1 radiculopathies, coronary artery disease, hyperlipidemia who presents for follow up complaining of cervicogenic headaches; she was initially seen due to left leg paresthesias with involuntary movements (myoclonus).  These are likely due to to her L5 radiculopathy.  Her neurological examination is significant for left L5 radiculopathy and length-dependent peripheral neuropathy.    -Vitamin B12 deficiency 210  --EMG - sensory  neuropathy as well as radiculopathies as detailed above    -Normal/negative monoclonal protein study, vitamin E, copper, methylmalonic acid, thiamine and pyridoxine    Cervicogenic headache likely related to mild cervical spondylosis and paraspinal muscle spasms; may also have component of occipital neuralgia.  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 Patient was advised to let my office know if they have any questions or concerns.       Today, I personally spent 10 minutes in this case, including chart review, time spent with patient doing face to face evaluation w/ interview and exam and patient education, counseling, and time was spent in patient education, counseling, and coordination of care as described above.   Issues discussed: Diagnosis and implications on future health, benefits and side effects of present and future medications, test results as well as further testing and medications required.    This note was prepared using Dragon Medical voice recognition dictation software and as a result, errors may occur. When identified, these errors have been corrected. While every  attempt is made to correct errors during dictation, discrepancies may still exist    ERIC Bui DO   Staff Physician, Neurology   10/9/2024  2:58 PM               [1]   Current Outpatient Medications on File Prior to Visit   Medication Sig Dispense Refill    Magnesium 200 MG Oral Tab Take by mouth.      vitamin E 200 UNITS Oral Cap Take 1 capsule (200 Units total) by mouth daily.      alendronate 35 MG Oral Tab Take 1 tablet (35 mg total) by mouth once a week. 30 MINUTES BEFORE THE FIRST FOOD BEVERAGE OR MEDICINE OF THE DAY WITH PLAIN WATER 13 tablet 3    metoprolol succinate ER 25 MG Oral Tablet 24 Hr Take 1 tablet (25 mg total) by mouth daily. 90 tablet 1    nitrofurantoin monohydrate macro 100 MG Oral Cap Take 1 capsule (100 mg total) by mouth 2 (two) times daily. 14 capsule 0    ACETAMINOPHEN-CODEINE 300-30 MG Oral Tab TAKE 1 TABLET BY MOUTH EVERY 6 HOURS AS NEEDED FOR PAIN 30 tablet 0    clopidogrel 75 MG Oral Tab Take 1 tablet (75 mg total) by mouth daily. 90 tablet 3    atorvastatin 80 MG Oral Tab Take 1 tablet (80 mg total) by mouth nightly. 90 tablet 3    DULoxetine 30 MG Oral Cap DR Particles Take 1 capsule (30 mg total) by mouth daily. 30 capsule 5    DULoxetine 60 MG Oral Cap DR Particles Take 1 capsule (60 mg total) by mouth every evening. 30 capsule 5    methocarbamol 750 MG Oral Tab Take 1 tablet (750 mg total) by mouth 3 (three) times daily. 270 tablet 1    gabapentin 400 MG Oral Cap Take 2 capsules (800 mg total) by mouth 2 (two) times daily. 180 capsule 3    diclofenac 1 % External Gel Apply 2 g topically 4 (four) times daily as needed. 100 g 1    amLODIPine 2.5 MG Oral Tab Take 1 tablet (2.5 mg total) by mouth daily.      Calcium Carb-Cholecalciferol (CALCIUM 600/VITAMIN D3) 600-20 MG-MCG Oral Tab Take 1 tablet by mouth in the morning and 1 tablet before bedtime.      latanoprost 0.005 % Ophthalmic Solution Place 1 drop into both eyes daily.      aspirin 81 MG Oral Chew Tab Chew 1 tablet (81 mg  total) by mouth daily.      acetaminophen 325 MG Oral Tab Take 500 mg by mouth 3 (three) times daily.       No current facility-administered medications on file prior to visit.

## 2024-10-10 ENCOUNTER — TELEPHONE (OUTPATIENT)
Dept: NEUROLOGY | Facility: CLINIC | Age: 79
End: 2024-10-10

## 2024-10-10 NOTE — TELEPHONE ENCOUNTER
Allison Bui DO [facility] Nurse  Hello, this patient wanted her daughter-in-law called about my recommendations re: her cervicogenic headaches - as stated in my note, I recommended occipital nerve blocks; physical therapy to help with neck and gait; acupuncture; continued physiatry follow up; perhaps increasing Gabapentin.  Thank you, FARHAD

## 2024-10-12 PROBLEM — M50.90 CERVICAL DISC DISEASE: Status: ACTIVE | Noted: 2024-10-12

## 2024-10-12 PROBLEM — M48.02 CERVICAL STENOSIS OF SPINE: Status: ACTIVE | Noted: 2024-10-12

## 2024-10-12 NOTE — TELEPHONE ENCOUNTER
I reviewed her MRI and she has mild-moderate bulging discs and stenosis.  She will need to start PT on the cervical spine if she has not done so yet.

## 2024-10-16 NOTE — TELEPHONE ENCOUNTER
HORTENCIA to [place] and relayed recommendations per Dr. Bui below. She verbalized understanding and stated that she will discuss the options with both her both and sister-in-law and call back with their thoughts.

## 2024-10-25 ENCOUNTER — TELEPHONE (OUTPATIENT)
Dept: PHYSICAL MEDICINE AND REHAB | Facility: CLINIC | Age: 79
End: 2024-10-25

## 2024-10-25 NOTE — TELEPHONE ENCOUNTER
This RN called patient to advise that Dr. Chong has ordered PT of the cervical spine.    Patient states she has right arm pain today and she is not ready to go back to PT. She stated that if she goes to PT it will be to the [facility]. This RN advised that this is a specialized therapy protocol Dr. Chong has ordered, and it may be best at a facility that offers that particular therapy.     This RN advised she would print out the order for patient .

## 2024-10-31 DIAGNOSIS — M54.16 LUMBAR RADICULOPATHY: ICD-10-CM

## 2024-10-31 DIAGNOSIS — Z86.16 HISTORY OF COVID-19: ICD-10-CM

## 2024-10-31 RX ORDER — TEMAZEPAM 7.5 MG/1
7.5 CAPSULE ORAL NIGHTLY PRN
Qty: 30 CAPSULE | Refills: 0 | OUTPATIENT
Start: 2024-10-31

## 2024-10-31 RX ORDER — ACETAMINOPHEN AND CODEINE PHOSPHATE 300; 30 MG/1; MG/1
1 TABLET ORAL EVERY 6 HOURS PRN
Qty: 30 TABLET | Refills: 0 | Status: SHIPPED | OUTPATIENT
Start: 2024-10-31

## 2024-10-31 NOTE — TELEPHONE ENCOUNTER
Refill Request    Medication request: ACETAMINOPHEN-CODEINE 300-30 MG Oral Tab.   TAKE 1 TABLET BY MOUTH EVERY 6 HOURS AS NEEDED FOR PAIN     LOV:6/10/2024 Marcelino Chong MD   Due back to clinic per last office note:  Follow up after EMG  NOV: 11/20/2024 Marcelino Chong MD      [place]: 9/24/24 #30 (7 days)    Urine drug screen (if applicable): none  Pain contract: none    LOV plan (if weaning or changing medications): The patient will continue with their current pain medications.            Refill Request    Medication request: temazepam 7.5 MG Oral Cap.  Take 1 capsule (7.5 mg total) by mouth nightly as needed for Sleep.        [place] unable to obtain.    LOV plan (if weaning or changing medications): The patient will continue with their current pain medications.     Temazepam discontinued. Also per patient he doesn't want this medication anymore as insurance doesn't cover it. Refill denied.

## 2024-11-02 DIAGNOSIS — Z86.16 HISTORY OF COVID-19: ICD-10-CM

## 2024-11-07 RX ORDER — TEMAZEPAM 7.5 MG/1
7.5 CAPSULE ORAL NIGHTLY PRN
Qty: 30 CAPSULE | Refills: 0 | Status: SHIPPED | OUTPATIENT
Start: 2024-11-07

## 2024-11-20 ENCOUNTER — OFFICE VISIT (OUTPATIENT)
Dept: PHYSICAL MEDICINE AND REHAB | Facility: CLINIC | Age: 79
End: 2024-11-20
Payer: MEDICARE

## 2024-11-20 ENCOUNTER — TELEPHONE (OUTPATIENT)
Dept: PHYSICAL MEDICINE AND REHAB | Facility: CLINIC | Age: 79
End: 2024-11-20

## 2024-11-20 VITALS — WEIGHT: 215 LBS | BODY MASS INDEX: 42.21 KG/M2 | HEIGHT: 60 IN

## 2024-11-20 DIAGNOSIS — M19.011 PRIMARY OSTEOARTHRITIS OF RIGHT SHOULDER: ICD-10-CM

## 2024-11-20 DIAGNOSIS — M96.1 LUMBAR POST-LAMINECTOMY SYNDROME: ICD-10-CM

## 2024-11-20 DIAGNOSIS — M43.16 SPONDYLOLISTHESIS OF LUMBAR REGION: ICD-10-CM

## 2024-11-20 DIAGNOSIS — G60.8 SENSORY PERIPHERAL NEUROPATHY: ICD-10-CM

## 2024-11-20 DIAGNOSIS — M48.061 LUMBAR FORAMINAL STENOSIS: ICD-10-CM

## 2024-11-20 DIAGNOSIS — M48.02 CERVICAL STENOSIS OF SPINE: ICD-10-CM

## 2024-11-20 DIAGNOSIS — M54.16 LUMBAR RADICULOPATHY: Primary | ICD-10-CM

## 2024-11-20 DIAGNOSIS — M50.90 CERVICAL DISC DISEASE: ICD-10-CM

## 2024-11-20 DIAGNOSIS — M51.26 LUMBAR HERNIATED DISC: ICD-10-CM

## 2024-11-20 DIAGNOSIS — E53.8 B12 DEFICIENCY: ICD-10-CM

## 2024-11-20 DIAGNOSIS — M51.9 LUMBAR DISC DISEASE: Primary | ICD-10-CM

## 2024-11-20 DIAGNOSIS — M54.16 LUMBAR RADICULOPATHY: ICD-10-CM

## 2024-11-20 DIAGNOSIS — E66.01 SEVERE OBESITY (BMI >= 40) (HCC): Chronic | ICD-10-CM

## 2024-11-20 PROCEDURE — 3008F BODY MASS INDEX DOCD: CPT | Performed by: PHYSICAL MEDICINE & REHABILITATION

## 2024-11-20 PROCEDURE — 99214 OFFICE O/P EST MOD 30 MIN: CPT | Performed by: PHYSICAL MEDICINE & REHABILITATION

## 2024-11-20 PROCEDURE — 1159F MED LIST DOCD IN RCRD: CPT | Performed by: PHYSICAL MEDICINE & REHABILITATION

## 2024-11-20 PROCEDURE — 1160F RVW MEDS BY RX/DR IN RCRD: CPT | Performed by: PHYSICAL MEDICINE & REHABILITATION

## 2024-11-20 NOTE — TELEPHONE ENCOUNTER
Initiated authorization for Left L4 and Left L5 TFESI under fluoroscopic guidance with IVCS. CPT/HCPCS 76971, 40895 dx:M54.16 to be done at [facility] with [employer]    Status: Approved  Reference/Authorization # [id number]  Valid: 11/20/24-1/19/25

## 2024-11-20 NOTE — TELEPHONE ENCOUNTER
Status of Anticoag  [x] Clearance pending to hold Plavix for 7 days prior to Left L4 and L5 transforaminal epidural steroid injection faxed to Dr. Yunior Soriano. F: [phone]  [] Clearance approved  [] Clearance denied

## 2024-11-20 NOTE — PATIENT INSTRUCTIONS
Plan  I will perform left L4 and L5 TFESI(s) under IVCS.    The patient will continue with her home exercise program.    The patient will continue with their current pain medications.    The patient will follow up in 3-4 months, but the patient will call me 2 weeks after having the injection to let me know how the injection worked.

## 2024-11-20 NOTE — PROGRESS NOTES
Low Back Pain H & P    Chief Complaint:   Chief Complaint   Patient presents with    Follow - Up     LOV 6/10/24 pt is here for a follow up. MRI of spine lumbar was completed 10/5/24. Reports having slight pain in the right shoulder and left leg. Reports having pins and needle sensation that starts at her knee and radiates down. Takes tylenol 3 , duloxetine , gabapentin and methocarbamol No current physical therapy.  Pain 9/10 ( leg )      Nursing note reviewed and verified.    Patient was last seen on 6/10/2024.  I did the EMG of the right arm and then she got the MRI of the cervical spine.  She states that she started to smoke 3 weeks ago and the neck pain improved after one week of the smoking.  She still have some right shoulder pain but this is unchanged.  She did not do the PT for the cervical spine.    She left leg pain worsened one month ago.  This pain is worse at night.  She called our office and she was given medication, Restoril, to help her to sleep.  She is having left leg pain from the knee down which is a pinching sensation.  With walking, she can get pain for the left low back and into the left whole leg.  She has some left leg weakness.  The pain is a 9/10.  I ;last did left L4 and L5 TFESI's on 9/22/2023.    Past Medical History   Past Medical History:    Atherosclerosis of coronary artery    Cataract    Coronary atherosclerosis    High cholesterol    HYPERLIPIDEMIA    OSTEOARTHRITIS    knees R>L    Osteoarthritis    OSTEOPOROSIS    Visual impairment    glasses       Past Surgical History   Past Surgical History:   Procedure Laterality Date    Appendectomy      Cataract      bilat    Cath bare metal stent (bms)      Cath percutaneous  transluminal coronary angioplasty      Colonoscopy  02/2009    adenomatous polyp--due in '12    Coronary stent placement  09/06/2024    Prox and Distal RCA    Other surgical history      CTS RELEASE BILAT,     Other surgical history      R rotator repair x 2        Family History   Family History   Problem Relation Age of Onset    Heart Disease Father     Heart Disorder Father         CAD    Other (Other) Mother         DJD    Diabetes Brother     Breast Cancer Sister 73       Social History   Social History     Socioeconomic History    Marital status:      Spouse name: Not on file    Number of children: Not on file    Years of education: Not on file    Highest education level: Not on file   Occupational History    Not on file   Tobacco Use    Smoking status: Some Days     Current packs/day: 0.75     Average packs/day: 0.8 packs/day for 30.0 years (22.5 ttl pk-yrs)     Types: Cigarettes     Passive exposure: Current    Smokeless tobacco: Never   Vaping Use    Vaping status: Never Used   Substance and Sexual Activity    Alcohol use: No    Drug use: No    Sexual activity: Not on file   Other Topics Concern    Not on file   Social History Narrative    Not on file     Social Drivers of Health     Financial Resource Strain: Not on file   Food Insecurity: Not on file   Transportation Needs: Not on file   Physical Activity: Not on file   Stress: Not on file   Social Connections: Not on file   Housing Stability: Not on file       PE:  The patient does appear in her stated age in mild distress.  The patient is well groomed.    Psychiatric:  The patient is alert and oriented x 3.  The patient has a normal affect and mood.      Respiratory:  No acute respiratory distress. Patient does not have a cough.    HEENT:  Extraocular muscles are intact. There is no kern icterus. Pupils are equal, round, and reactive to light. No redness or discharge bilaterally.    Skin:  There are no rashes or lesions.    Vitals:  There were no vitals filed for this visit.    Gait:    Gait: left antalgic gait   Sit to Stand: moderate difficulty      Vascular lower extremity:   Dorsalis pedis pulse-RIGHT 2+   Dorsalis pedis pulse-LEFT 2+   Tibialis posterior pulse-RIGHT 2+   Tibialis posterior  pulse-LEFT 2+     Neurological Lower Extremity:    Light Touch Sensation: Intact in bilateral LE except:  Increased in the lateral  medial Left thigh  lateral  medial  Left Lower Leg  arch of the LEFT foot  lateral LEFT foot  first dorsal web space of the LEFT foot   LE Muscle Strength: All LE strength measurements 5/5 except:  Hamstring LEFT:   3+/5  Hip Flexion LEFT:   3+/5  Knee Extension LEFT:   3+/5  Ankle Dorsiflexlon LEFT:   4/5  Extensor Hallucis Longus LEFT:   4/5  With give way/effort   RIGHT plantar reflexes: downward response   LEFT plantar reflexes: downward response   Reflexes: 2+ in bilateral lower extremities except:  Left Achilles tendon which are absent  Left patellar tendon which are absent     Assessment  1. L1-2 mild diffuse & bilateral mod far lat, L2-3 right mod far lat & mild diffuse, L3-4 mod-large diffuse & left large far lat, L4-5 mild diffuse & left mod far lat, L5-S1 lt mild galilea bulging discs    2. L4-5 bilateral mod-severe, L3-4 left mod-severe/right severe, L2-3 left mod/right mod-severe foraminal stenosis    3. L4-5 mild central herniated disc    4. left L4-5-S1 chronic and right S1 chronic radiculopathy    5. Sensory peripheral neuropathy: axonal    6. B12 deficiency    7. Severe obesity (BMI >= 40) ([facility])    8. L5-S1 grade 1 unstable, L4-5 grade 1 stable spondylolisthesis    9. Lumbar post-laminectomy syndrome: L1-S1    10. C2-3 mild central, C3-4 mild diffuse, C5-6 mild diffuse, C6-7 right mild & left mild-mod foraminal, C7-T1 left mod foraminal & mild diffuse bulging discs    11. C6-7 left mild foraminal, C7-T1 left moderate foraminal stenosis    12. Primary osteoarthritis of right shoulder: moderate glenohumeral and AC joint         Plan  I will perform left L4 and L5 TFESI(s) under IVCS.    The patient will continue with her home exercise program.    The patient will continue with their current pain medications.    The patient will follow up in 3-4 months, but the patient will  call me 2 weeks after having the injection to let me know how the injection worked.    The patient understands and agrees with the stated plan.  Marcelino Chong MD  11/20/2024

## 2024-11-21 NOTE — TELEPHONE ENCOUNTER
Spoke with states [place] form is currently pending    Received [place] Cardiac Risk Assessment request form.  Completed and faxed to [phone]

## 2024-11-22 ENCOUNTER — MED REC SCAN ONLY (OUTPATIENT)
Dept: PHYSICAL MEDICINE AND REHAB | Facility: CLINIC | Age: 79
End: 2024-11-22

## 2024-11-22 ENCOUNTER — TELEPHONE (OUTPATIENT)
Dept: INTERNAL MEDICINE CLINIC | Facility: CLINIC | Age: 79
End: 2024-11-22

## 2024-11-22 DIAGNOSIS — M54.2 NECK PAIN: ICD-10-CM

## 2024-11-22 RX ORDER — DULOXETIN HYDROCHLORIDE 30 MG/1
30 CAPSULE, DELAYED RELEASE ORAL DAILY
Qty: 90 CAPSULE | Refills: 1 | Status: SHIPPED | OUTPATIENT
Start: 2024-11-22

## 2024-11-22 RX ORDER — METHOCARBAMOL 750 MG/1
750 TABLET, FILM COATED ORAL 3 TIMES DAILY
Qty: 270 TABLET | Refills: 1 | Status: SHIPPED | OUTPATIENT
Start: 2024-11-22

## 2024-11-22 NOTE — TELEPHONE ENCOUNTER
Refill request is for a maintenance medication and has met the criteria specified in the Ambulatory Medication Refill Standing Order for eligibility, visits, laboratory, alerts and was sent to the requested pharmacy.    Requested Prescriptions     Signed Prescriptions Disp Refills    DULoxetine 30 MG Oral Cap DR Particles 90 capsule 1     Sig: TAKE 1 CAPSULE(30 MG) BY MOUTH DAILY     Authorizing Provider: D'AMICO, JEFF ANTHONY     Ordering User: ZEKE CONROY

## 2024-11-25 NOTE — TELEPHONE ENCOUNTER
Status of Anticoag  [] Clearance pending  [] Clearance approved  [x] Clearance denied. - received note\" Please be advised that the patient underwent PCI 9/6/24, at this time Dr Soriano has not cleared the patient to hold Plavix for ADRIAN.  Recommend postponing until at least September 2025.\"    Placing into scanning.

## 2024-11-26 DIAGNOSIS — M54.2 NECK PAIN: ICD-10-CM

## 2024-11-26 RX ORDER — DULOXETIN HYDROCHLORIDE 60 MG/1
60 CAPSULE, DELAYED RELEASE ORAL EVERY EVENING
Qty: 90 CAPSULE | Refills: 1 | Status: SHIPPED | OUTPATIENT
Start: 2024-11-26

## 2024-11-26 NOTE — TELEPHONE ENCOUNTER
Refill request is for a maintenance medication and has met the criteria specified in the Ambulatory Medication Refill Standing Order for eligibility, visits, laboratory, alerts and was sent to the requested pharmacy.    Requested Prescriptions     Signed Prescriptions Disp Refills    DULoxetine 60 MG Oral Cap DR Particles 90 capsule 1     Sig: TAKE 1 CAPSULE(60 MG) BY MOUTH EVERY EVENING     Authorizing Provider: D'AMICO, JEFF ANTHONY     Ordering User: ZEKE CONROY

## 2024-12-09 DIAGNOSIS — M54.16 LUMBAR RADICULOPATHY: ICD-10-CM

## 2024-12-09 DIAGNOSIS — Z86.16 HISTORY OF COVID-19: ICD-10-CM

## 2024-12-09 NOTE — TELEPHONE ENCOUNTER
Refill Request    Medication request: ACETAMINOPHEN-CODEINE 300-30 MG Oral Tab TAKE 1 TABLET BY MOUTH EVERY 6 HOURS AS NEEDED FOR PAIN     LOV:11/20/2024 Marcelino Chong MD   Due back to clinic per last office note:  \"The patient will follow up in 3-4 months \"  NOV: Visit date not found      [place]: 10/31/24 #30 - 7 day supply    Urine drug screen (if applicable): none  Pain contract: none    LOV plan (if weaning or changing medications): Per  at last office visit: \"The patient will continue with their current pain medications. \"           Medication request: TEMAZEPAM 7.5 MG Oral Cap TAKE 1 CAPSULE(7.5 MG) BY MOUTH EVERY NIGHT AS NEEDED FOR SLEEP     [place] 11/8/24 #30 -30 day supply

## 2024-12-10 RX ORDER — TEMAZEPAM 7.5 MG/1
7.5 CAPSULE ORAL NIGHTLY PRN
Qty: 30 CAPSULE | Refills: 0 | Status: SHIPPED | OUTPATIENT
Start: 2024-12-10

## 2024-12-10 RX ORDER — ACETAMINOPHEN AND CODEINE PHOSPHATE 300; 30 MG/1; MG/1
1 TABLET ORAL EVERY 6 HOURS PRN
Qty: 30 TABLET | Refills: 0 | Status: SHIPPED | OUTPATIENT
Start: 2024-12-10

## 2024-12-26 DIAGNOSIS — M54.2 NECK PAIN: ICD-10-CM

## 2024-12-26 RX ORDER — DULOXETIN HYDROCHLORIDE 60 MG/1
60 CAPSULE, DELAYED RELEASE ORAL EVERY EVENING
Qty: 90 CAPSULE | Refills: 3 | Status: SHIPPED | OUTPATIENT
Start: 2024-12-26

## 2024-12-26 NOTE — TELEPHONE ENCOUNTER
Refill Request    Medication request: DULoxetine 60 MG Oral Cap DR Particles. TAKE 1 CAPSULE(60 MG) BY MOUTH EVERY EVENING.     LOV:11/20/2024 Marcelino Chong MD   Due back to clinic per last office note: Per Dr. Chong: \"The patient will follow up in 3-4 months, but the patient will call me 2 weeks after having the injection to let me know how the injection worked.\"  NOV: Visit date not found      [place]: 11/26/2024 #30    /Urine drug screen (if applicable): n/a  Pain contract: n/a    LOV plan (if weaning or changing medications): Per Dr. Chong: \"The patient will continue with their current pain medications.\"

## 2025-01-07 DIAGNOSIS — M54.16 LUMBAR RADICULOPATHY: ICD-10-CM

## 2025-01-07 DIAGNOSIS — Z86.16 HISTORY OF COVID-19: ICD-10-CM

## 2025-01-08 RX ORDER — TEMAZEPAM 7.5 MG/1
7.5 CAPSULE ORAL NIGHTLY PRN
Qty: 30 CAPSULE | Refills: 0 | Status: SHIPPED | OUTPATIENT
Start: 2025-01-08

## 2025-01-08 RX ORDER — ACETAMINOPHEN AND CODEINE PHOSPHATE 300; 30 MG/1; MG/1
1 TABLET ORAL EVERY 6 HOURS PRN
Qty: 30 TABLET | Refills: 0 | Status: SHIPPED | OUTPATIENT
Start: 2025-01-08

## 2025-01-08 NOTE — TELEPHONE ENCOUNTER
Refill Request    Medication request: TEMAZEPAM 7.5 MG Oral Cap   TAKE 1 CAPSULE(7.5 MG) BY MOUTH EVERY NIGHT AS NEEDED FOR SLEEP     LOV:11/20/2024 Marcelino Chong MD   Due back to clinic per last office note:  \"The patient will follow up in 3-4 months, but the patient will call me 2 weeks after having the injection to let me know how the injection worked. \"  NOV: Visit date not found      [place]: 12/10/24 #30 - 30 day supply    Urine drug screen (if applicable): n/a  Pain contract: none    LOV plan (if weaning or changing medications): Per  at last office visit: \"  The patient will continue with their current pain medications. \"

## 2025-01-08 NOTE — TELEPHONE ENCOUNTER
Refill Request    Medication request: ACETAMINOPHEN-CODEINE 300-30 MG Oral Tab  TAKE 1 TABLET BY MOUTH EVERY 6 HOURS AS NEEDED FOR PAIN     LOV:11/20/2024 Marcelino Chong MD   Due back to clinic per last office note:  \"The patient will follow up in 3-4 months, but the patient will call me 2 weeks after having the injection to let me know how the injection worked. \"  NOV: Visit date not found      [place]: 12/10/24 #30 - 7 day supply    Urine drug screen (if applicable): n/a  Pain contract: none    LOV plan (if weaning or changing medications): Per  at last office visit: \"  The patient will continue with their current pain medications. \"

## 2025-01-10 RX ORDER — GABAPENTIN 400 MG/1
800 CAPSULE ORAL 2 TIMES DAILY
Qty: 180 CAPSULE | Refills: 3 | Status: SHIPPED | OUTPATIENT
Start: 2025-01-10

## 2025-01-10 NOTE — TELEPHONE ENCOUNTER
LOV med list 10/4/2024:    Gabapentin 400mg  Take 2 capsules (800 mg total) by mouth 2 (two) times daisy     Refill request is for a maintenance medication and has met the criteria specified in the Ambulatory Medication Refill Standing Order for eligibility, visits, laboratory, alerts and was sent to the requested pharmacy.    Requested Prescriptions     Signed Prescriptions Disp Refills    GABAPENTIN 400 MG Oral Cap 180 capsule 3     Sig: TAKE 2 CAPSULES(800 MG) BY MOUTH TWICE DAILY     Authorizing Provider: D'AMICO, JEFF ANTHONY     Ordering User: LORENZO BILL

## 2025-01-10 NOTE — TELEPHONE ENCOUNTER
Patient is calling checking on the status of her Gabapentin refill.    Patient was read the my chart message that was send on 1/9/2025.    Patient states the is currently taking 2 pills in the morning and 2 pills at night.    Please call patient to let her know when the prescription has been called into the pharmacy.

## 2025-02-08 DIAGNOSIS — Z86.16 HISTORY OF COVID-19: ICD-10-CM

## 2025-02-08 DIAGNOSIS — M54.16 LUMBAR RADICULOPATHY: ICD-10-CM

## 2025-02-10 DIAGNOSIS — Z86.16 HISTORY OF COVID-19: ICD-10-CM

## 2025-02-10 RX ORDER — TEMAZEPAM 7.5 MG/1
CAPSULE ORAL
Qty: 30 CAPSULE | Refills: 0 | Status: SHIPPED | OUTPATIENT
Start: 2025-02-10

## 2025-02-10 RX ORDER — ACETAMINOPHEN AND CODEINE PHOSPHATE 300; 30 MG/1; MG/1
1 TABLET ORAL EVERY 6 HOURS PRN
Qty: 30 TABLET | Refills: 0 | Status: SHIPPED | OUTPATIENT
Start: 2025-02-10

## 2025-02-10 RX ORDER — TEMAZEPAM 7.5 MG/1
7.5 CAPSULE ORAL NIGHTLY PRN
Qty: 30 CAPSULE | Refills: 0 | Status: CANCELLED | OUTPATIENT
Start: 2025-02-10

## 2025-02-10 NOTE — TELEPHONE ENCOUNTER
Refill Request    Medication request: acetaminophen-codeine 300-30 MG Oral Tab Take 1 tablet by mouth every 6 (six) hours as needed for Pain.     LOV:11/20/2024 Marcelino Chong MD   Due back to clinic per last office note:  \"The patient will follow up in 3-4 months, but the patient will call me 2 weeks after having the injection to let me know how the injection worked. \"  NOV: Visit date not found      [place]: 1/9/25 #30 - 7 day supply    Urine drug screen (if applicable): none  Pain contract: none    LOV plan (if weaning or changing medications): Per  at last office visit: \"The patient will continue with their current pain medications.\"         Medication request: temazepam 7.5 MG Oral Cap Take 1 capsule (7.5 mg total) by mouth nightly as needed for Sleep     [place]: 1/9/25 #30 - 30 day supply

## 2025-02-10 NOTE — TELEPHONE ENCOUNTER
Temazepam and Tylenol #3 sent to Dr. Chong today (see refill encounter 02/08/2025 for further details if needed).     Duloxetine not yet requested.     Upon reviewing pt's chart - pt received a 90 day supply on 12/26/2024. With 3 refills ordered with this supply. MCM sent to patient to update them regarding status of refills.      Outpatient Medication Detail     Disp Refills Start End    DULoxetine 60 MG Oral Cap DR Particles 90 capsule 3 12/26/2024 --    Sig - Route: TAKE 1 CAPSULE(60 MG) BY MOUTH EVERY EVENING - Oral    Sent to pharmacy as: DULoxetine HCl 60 MG Oral Capsule Delayed Release Particles (Cymbalta)    E-Prescribing Status: Receipt confirmed by pharmacy (12/26/2024 11:26 AM CST)      Associated Diagnoses    Neck pain        Pharmacy    [facility] #[id number] - [place] - [address] [facility] [address], [phone], [phone]

## 2025-03-12 DIAGNOSIS — M54.16 LUMBAR RADICULOPATHY: ICD-10-CM

## 2025-03-12 DIAGNOSIS — Z86.16 HISTORY OF COVID-19: ICD-10-CM

## 2025-03-13 RX ORDER — ACETAMINOPHEN AND CODEINE PHOSPHATE 300; 30 MG/1; MG/1
1 TABLET ORAL EVERY 6 HOURS PRN
Qty: 30 TABLET | Refills: 0 | Status: SHIPPED | OUTPATIENT
Start: 2025-03-13

## 2025-03-13 RX ORDER — TEMAZEPAM 7.5 MG/1
CAPSULE ORAL
Qty: 30 CAPSULE | Refills: 0 | Status: SHIPPED | OUTPATIENT
Start: 2025-03-13 | End: 2025-03-17

## 2025-03-13 NOTE — TELEPHONE ENCOUNTER
Refill Request    Medication request: ACETAMINOPHEN-CODEINE 300-30 MG Oral Tab.  TAKE 1 TABLET BY MOUTH EVERY 6 HOURS AS NEEDED FOR PAIN     LOV:11/20/2024 Marcelino Chong MD   Due back to clinic per last office note:  Return in about 3 months   NOV: 4/16/2025 Marcelino Chong MD      [place]: 2/11/25 #30 (7 days)    Urine drug screen (if applicable): none  Pain contract: none    LOV plan (if weaning or changing medications): The patient will continue with their current pain medications.            Refill Request    Medication request: TEMAZEPAM 7.5 MG Oral Cap. TAKE 1 CAPSULE(7.5 MG) BY MOUTH AT NIGHT AS NEEDED FOR SLEEP       [place]: 2/11/25 #30 (30 days)    LOV plan (if weaning or changing medications): The patient will continue with their current pain medications.

## 2025-03-17 RX ORDER — TEMAZEPAM 7.5 MG/1
7.5 CAPSULE ORAL NIGHTLY PRN
Qty: 30 CAPSULE | Refills: 0 | Status: SHIPPED | OUTPATIENT
Start: 2025-03-17

## 2025-03-17 NOTE — TELEPHONE ENCOUNTER
Pt called looking for the refill on medications listed in this message to be sent to     [facility] #[id number] - [place] - [address] AT [facility] [address], [phone], [phone] (Ph: [phone])

## 2025-03-17 NOTE — TELEPHONE ENCOUNTER
Outpatient Medication Detail     Disp Refills Start End    ACETAMINOPHEN-CODEINE 300-30 MG Oral Tab 30 tablet 0 3/13/2025 --    Sig - Route: TAKE 1 TABLET BY MOUTH EVERY 6 HOURS AS NEEDED FOR PAIN - Oral    Sent to pharmacy as: Acetaminophen-Codeine 300-30 MG Oral Tablet (Tylenol #3)    E-Prescribing Status: Receipt confirmed by pharmacy (3/13/2025  8:44 PM CDT)      Associated Diagnoses    left L4-5-S1 chronic and right S1 chronic radiculopathy        Pharmacy    [facility] #[id number] - [place], [place] - [address] AT [facility], [phone], [phone]         Tylenol #3 was sent to pharmacy. Temazepam was printed not e-scribed to nowhere.   RX re-pended and sent as high priority for MD's signature.

## 2025-03-25 DIAGNOSIS — I10 BENIGN HYPERTENSION: ICD-10-CM

## 2025-03-25 RX ORDER — METOPROLOL SUCCINATE 25 MG/1
25 TABLET, EXTENDED RELEASE ORAL DAILY
Qty: 90 TABLET | Refills: 1 | Status: CANCELLED | OUTPATIENT
Start: 2025-03-25

## 2025-03-25 RX ORDER — METOPROLOL SUCCINATE 25 MG/1
25 TABLET, EXTENDED RELEASE ORAL DAILY
Qty: 90 TABLET | Refills: 3 | Status: SHIPPED | OUTPATIENT
Start: 2025-03-25

## 2025-03-25 NOTE — TELEPHONE ENCOUNTER
Refill request is for a maintenance medication and has met the criteria specified in the Ambulatory Medication Refill Standing Order for eligibility, visits, laboratory, alerts and was sent to the requested pharmacy.    Requested Prescriptions     Signed Prescriptions Disp Refills    metoprolol succinate ER 25 MG Oral Tablet 24 Hr 90 tablet 3     Sig: TAKE 1 TABLET(25 MG) BY MOUTH DAILY     Authorizing Provider: D'AMICO, JEFF ANTHONY     Ordering User: MARIETTA ÁLVAREZ

## 2025-03-26 RX ORDER — METHOCARBAMOL 750 MG/1
750 TABLET, FILM COATED ORAL 3 TIMES DAILY
Qty: 270 TABLET | Refills: 1 | Status: SHIPPED | OUTPATIENT
Start: 2025-03-26

## 2025-03-26 NOTE — TELEPHONE ENCOUNTER
Metoprolol refilled yesterday.      Refill request is for a maintenance medication and has met the criteria specified in the Ambulatory Medication Refill Standing Order for eligibility, visits, laboratory, alerts and was sent to the requested pharmacy.    Requested Prescriptions     Signed Prescriptions Disp Refills    methocarbamol 750 MG Oral Tab 270 tablet 1     Sig: Take 1 tablet (750 mg total) by mouth 3 (three) times daily.     Authorizing Provider: D'AMICO, JEFF ANTHONY     Ordering User: UVALDO CRONIN

## 2025-03-26 NOTE — TELEPHONE ENCOUNTER
Wai faxed over a Drug Change Request for:     Methocarbamol    Drug is not covered by patients plan    See fax in green folder for alternatives

## 2025-03-27 ENCOUNTER — PATIENT MESSAGE (OUTPATIENT)
Dept: INTERNAL MEDICINE CLINIC | Facility: CLINIC | Age: 80
End: 2025-03-27

## 2025-03-27 NOTE — TELEPHONE ENCOUNTER
Patient called, she was not able to  methocarbamol refill   Patient only has enough to cover until Saturday   Requests call back to advise

## 2025-03-27 NOTE — TELEPHONE ENCOUNTER
Spoke to pt - insurance issue likely;  she can use GoodRx;  she will have her dtr call here to explain to her (both insurance and getting GoodRx coupon online)

## 2025-03-28 ENCOUNTER — TELEPHONE (OUTPATIENT)
Dept: INTERNAL MEDICINE CLINIC | Facility: CLINIC | Age: 80
End: 2025-03-28

## 2025-03-28 NOTE — TELEPHONE ENCOUNTER
Wai faxed over Drug Change Request for    Methocarbamol     Message:  Drug not covered by patients insur.  Please provide alternative.    Fax placed in green folder

## 2025-03-28 NOTE — TELEPHONE ENCOUNTER
Okay to handle this Monday, but lets make sure we talk with the patient first before exchanging any information with these people, could be a cold call

## 2025-03-28 NOTE — TELEPHONE ENCOUNTER
[facility] called asking for the last chart note for this pt. Stating she does need leg and shoulder prosthetics.  They said the pt. Called there for the supplies.  [facility] stated this is urgent. Please fax office note to [phone].

## 2025-03-31 ENCOUNTER — TELEPHONE (OUTPATIENT)
Dept: INTERNAL MEDICINE CLINIC | Facility: CLINIC | Age: 80
End: 2025-03-31

## 2025-03-31 NOTE — TELEPHONE ENCOUNTER
Wai MORGAN request  Methocarbamol 750 mg    ID [id number]  Phone [phone]    Placed in purple folder

## 2025-04-02 NOTE — TELEPHONE ENCOUNTER
Received faxed Notice of Denial of Medicare Part D Drug Coverage for Methocarbamol.     Form placed in red folder.

## 2025-04-02 NOTE — TELEPHONE ENCOUNTER
ePA submitted:  Prior Authorization History  methocarbamol 750 MG Oral Tab     Waiting for Payer Response   4/2/2025 10:42 AM  Deadline to reply: April 4, 2025 10:32 AM Sending user: Farideh Mendes RN   Note to payer: Dx: Musculoskeletal pain,  M54.2, G89.29, M25.511, G89.29   Payer: NITESH Santos Case ID: [id number]

## 2025-04-11 ENCOUNTER — OFFICE VISIT (OUTPATIENT)
Dept: INTERNAL MEDICINE CLINIC | Facility: CLINIC | Age: 80
End: 2025-04-11

## 2025-04-11 ENCOUNTER — TELEPHONE (OUTPATIENT)
Dept: INTERNAL MEDICINE CLINIC | Facility: CLINIC | Age: 80
End: 2025-04-11

## 2025-04-11 VITALS
HEART RATE: 70 BPM | WEIGHT: 211 LBS | HEIGHT: 61 IN | DIASTOLIC BLOOD PRESSURE: 62 MMHG | TEMPERATURE: 98 F | BODY MASS INDEX: 39.84 KG/M2 | SYSTOLIC BLOOD PRESSURE: 122 MMHG

## 2025-04-11 DIAGNOSIS — Z87.440 HISTORY OF UTI: ICD-10-CM

## 2025-04-11 DIAGNOSIS — N18.31 STAGE 3A CHRONIC KIDNEY DISEASE (HCC): Chronic | ICD-10-CM

## 2025-04-11 DIAGNOSIS — E78.5 HYPERLIPIDEMIA, UNSPECIFIED HYPERLIPIDEMIA TYPE: ICD-10-CM

## 2025-04-11 DIAGNOSIS — M54.16 LUMBAR RADICULOPATHY: Primary | ICD-10-CM

## 2025-04-11 DIAGNOSIS — M81.0 AGE-RELATED OSTEOPOROSIS WITHOUT CURRENT PATHOLOGICAL FRACTURE: ICD-10-CM

## 2025-04-11 DIAGNOSIS — I10 BENIGN HYPERTENSION: ICD-10-CM

## 2025-04-11 DIAGNOSIS — G62.9 PERIPHERAL POLYNEUROPATHY: ICD-10-CM

## 2025-04-11 DIAGNOSIS — F17.210 SMOKING 1/2 PACK A DAY OR LESS: ICD-10-CM

## 2025-04-11 PROCEDURE — 99214 OFFICE O/P EST MOD 30 MIN: CPT | Performed by: INTERNAL MEDICINE

## 2025-04-11 PROCEDURE — 1125F AMNT PAIN NOTED PAIN PRSNT: CPT | Performed by: INTERNAL MEDICINE

## 2025-04-11 PROCEDURE — 1159F MED LIST DOCD IN RCRD: CPT | Performed by: INTERNAL MEDICINE

## 2025-04-11 RX ORDER — ACETAMINOPHEN AND CODEINE PHOSPHATE 300; 30 MG/1; MG/1
1 TABLET ORAL EVERY 6 HOURS PRN
Qty: 30 TABLET | Refills: 0 | Status: CANCELLED | OUTPATIENT
Start: 2025-04-11

## 2025-04-11 RX ORDER — ACETAMINOPHEN AND CODEINE PHOSPHATE 300; 30 MG/1; MG/1
1 TABLET ORAL EVERY 6 HOURS PRN
Qty: 60 TABLET | Refills: 1 | Status: CANCELLED | OUTPATIENT
Start: 2025-04-11

## 2025-04-11 NOTE — TELEPHONE ENCOUNTER
Thank you Dax.  She has been very inconsistent with her follow ups.  I will be seeing this week and I will discuss these issues with her.

## 2025-04-11 NOTE — PATIENT INSTRUCTIONS
1. left L4-5-S1 chronic and right S1 chronic radiculopathy  I have messaged Dr. holman, lets see what he says about the pain control regimen, and what the next steps may be, it may be about the treatment with Tylenol 3, but we can let him guide this    2. Stage 3a chronic kidney disease (HCC)  Stable and doing well, lets recheck this on lab work in the upcoming future    3. Benign hypertension  Good control here, nice job continue current monitoring management    4. Peripheral polyneuropathy  Stable continue current monitoring management, medications    5. Hyperlipidemia, unspecified hyperlipidemia type  Aggressive statin medications  - CBC With Differential With Platelet  - Comp Metabolic Panel (14)    6. Age-related osteoporosis without current pathological fracture  Stable continue current monitoring management    7. Smoking 1/2 pack a day or less  Cessation as discussed    8. History of UTI  Lets retest the urine here, seeing if we need to retreat again here, let me know about any recurrent symptoms  - Urinalysis with Culture Reflex

## 2025-04-11 NOTE — TELEPHONE ENCOUNTER
Marcelino, this patient was in the office, still having trouble with the left leg and back, she has completed the 30-day supply of the Tylenol 3, claims at times she does take a second 1 during nights in which her pain is very rough, seems to tolerate this well.  I am not sure what options you have for her, whether it is uptitrating her current medicines, or other options, but it was a major part of the visit from today. Hoping you can make some action here

## 2025-04-14 DIAGNOSIS — M54.16 LUMBAR RADICULOPATHY: ICD-10-CM

## 2025-04-14 DIAGNOSIS — Z86.16 HISTORY OF COVID-19: ICD-10-CM

## 2025-04-14 NOTE — TELEPHONE ENCOUNTER
Refill Request    Medication request: ACETAMINOPHEN-CODEINE 300-30 MG Oral Tab TAKE 1 TABLET BY MOUTH EVERY 6 HOURS AS NEEDED FOR PAIN     LOV:11/20/2024 Marcelino Chong MD   Due back to clinic per last office note:  \"The patient will follow up in 3-4 months, but the patient will call me 2 weeks after having the injection to let me know how the injection worked. \"  NOV: 4/16/2025 Marcelino Chong MD      [place]: 3/18/25 #30 - 7 day supply    Urine drug screen (if applicable): none  Pain contract: none    LOV plan (if weaning or changing medications): Per  at last office visit: \"The patient will continue with their current pain medications. \"         Medication request: temazepam 7.5 MG Oral Cap Take 1 capsule (7.5 mg total) by mouth nightly as needed for Sleep.     [place] 3/18/25 #30 - 30 day supply

## 2025-04-15 DIAGNOSIS — M54.16 LUMBAR RADICULOPATHY: ICD-10-CM

## 2025-04-15 DIAGNOSIS — Z86.16 HISTORY OF COVID-19: ICD-10-CM

## 2025-04-15 RX ORDER — TEMAZEPAM 7.5 MG/1
7.5 CAPSULE ORAL NIGHTLY PRN
Qty: 30 CAPSULE | Refills: 0 | Status: SHIPPED | OUTPATIENT
Start: 2025-04-15

## 2025-04-15 RX ORDER — TEMAZEPAM 7.5 MG/1
7.5 CAPSULE ORAL NIGHTLY PRN
Qty: 30 CAPSULE | Refills: 0 | OUTPATIENT
Start: 2025-04-15

## 2025-04-15 RX ORDER — ACETAMINOPHEN AND CODEINE PHOSPHATE 300; 30 MG/1; MG/1
1 TABLET ORAL EVERY 6 HOURS PRN
Qty: 30 TABLET | Refills: 0 | OUTPATIENT
Start: 2025-04-15

## 2025-04-15 NOTE — TELEPHONE ENCOUNTER
Refill Request    Medication request: ACETAMINOPHEN-CODEINE 300-30 MG Oral Tab.  TAKE 1 TABLET BY MOUTH EVERY 6 HOURS AS NEEDED FOR PAIN      LOV:11/20/2024 Marcelino Chong MD   Due back to clinic per last office note:  The patient will follow up in 3-4 months, but the patient will call me 2 weeks after having the injection to let me know how the injection worked.   NOV: 4/16/2025 Marcelino Chong MD      [place]: 3/18/25 #30 (7 days)    Urine drug screen (if applicable): none  Pain contract: none    LOV plan (if weaning or changing medications): The patient will continue with their current pain medications.          Refill Request    Medication request: temazepam 7.5 MG Oral Cap.   Take 1 capsule (7.5 mg total) by mouth nightly as needed for Sleep.      [place]: 3/18/25 #30 (30 days)    LOV plan (if weaning or changing medications): The patient will continue with their current pain medications.

## 2025-04-16 ENCOUNTER — OFFICE VISIT (OUTPATIENT)
Dept: PHYSICAL MEDICINE AND REHAB | Facility: CLINIC | Age: 80
End: 2025-04-16
Payer: MEDICARE

## 2025-04-16 VITALS — BODY MASS INDEX: 39.84 KG/M2 | WEIGHT: 211 LBS | HEIGHT: 61 IN

## 2025-04-16 DIAGNOSIS — M43.16 SPONDYLOLISTHESIS OF LUMBAR REGION: ICD-10-CM

## 2025-04-16 DIAGNOSIS — M54.16 LUMBAR RADICULOPATHY: Primary | ICD-10-CM

## 2025-04-16 DIAGNOSIS — I10 BENIGN HYPERTENSION: ICD-10-CM

## 2025-04-16 DIAGNOSIS — M50.90 CERVICAL DISC DISEASE: ICD-10-CM

## 2025-04-16 DIAGNOSIS — E66.01 SEVERE OBESITY (BMI >= 40) (HCC): Chronic | ICD-10-CM

## 2025-04-16 DIAGNOSIS — M51.26 LUMBAR HERNIATED DISC: ICD-10-CM

## 2025-04-16 DIAGNOSIS — M48.02 CERVICAL STENOSIS OF SPINE: ICD-10-CM

## 2025-04-16 DIAGNOSIS — G89.29 CHRONIC RIGHT SHOULDER PAIN: ICD-10-CM

## 2025-04-16 DIAGNOSIS — M96.1 LUMBAR POST-LAMINECTOMY SYNDROME: ICD-10-CM

## 2025-04-16 DIAGNOSIS — M25.511 CHRONIC RIGHT SHOULDER PAIN: ICD-10-CM

## 2025-04-16 DIAGNOSIS — M51.9 LUMBAR DISC DISEASE: ICD-10-CM

## 2025-04-16 DIAGNOSIS — M48.061 LUMBAR FORAMINAL STENOSIS: ICD-10-CM

## 2025-04-16 PROCEDURE — 1159F MED LIST DOCD IN RCRD: CPT | Performed by: PHYSICAL MEDICINE & REHABILITATION

## 2025-04-16 PROCEDURE — 99214 OFFICE O/P EST MOD 30 MIN: CPT | Performed by: PHYSICAL MEDICINE & REHABILITATION

## 2025-04-16 PROCEDURE — 1160F RVW MEDS BY RX/DR IN RCRD: CPT | Performed by: PHYSICAL MEDICINE & REHABILITATION

## 2025-04-16 RX ORDER — ACETAMINOPHEN AND CODEINE PHOSPHATE 300; 30 MG/1; MG/1
1 TABLET ORAL EVERY 6 HOURS PRN
Qty: 30 TABLET | Refills: 0 | Status: SHIPPED | OUTPATIENT
Start: 2025-04-17

## 2025-04-16 RX ORDER — TEMAZEPAM 7.5 MG/1
7.5 CAPSULE ORAL NIGHTLY PRN
Qty: 30 CAPSULE | Refills: 0 | Status: SHIPPED | OUTPATIENT
Start: 2025-04-16

## 2025-04-16 RX ORDER — BACLOFEN 10 MG/1
10 TABLET ORAL 3 TIMES DAILY
Qty: 90 TABLET | Refills: 3 | Status: SHIPPED | OUTPATIENT
Start: 2025-04-16

## 2025-04-16 NOTE — PROGRESS NOTES
HPI:   Fidelia Villar is a 79 year old female who presents for complains of:   Chief Complaint   Patient presents with    Follow - Up     6 month     Low back pain: Patient seems stable, but continues to deal with chronic low back pain, she is frustrated by her chronic nature here, and has tried multiple aspects.  She has had trouble losing weight.  Her pain control methods have been medications at time, but she is living with a significant amount of pain.    History of neuropathy: Patient seems stable, doing well, on medications for neuropathy that seems to be stable, she continues to deal with pain    Smoking: Patient is recently restarted smoking, she claims only a few cigarettes a day, she claims this seems to help with her chronic pain.  We did have a long discussion about the positives and negatives of tobacco inhalation, and she seems remorseful, verbalized understanding.    History of recurrent UTI: Patient seems to be stable, asymptomatic this time, but has had some recurrent UTI, seems stable on current medications.    Hyperlipidemia: Patient seems stable on her current medication regimen, aggressive statin dosing, she verbalizes understanding  over the benefits of aggressive dosing outweighing the risks    EXAM:   /62   Pulse 70   Temp 98.1 °F (36.7 °C) (Oral)   Ht 5' 1\" (1.549 m)   Wt 211 lb (95.7 kg)   BMI 39.87 kg/m²   Body mass index is 39.87 kg/m².   Gen. exam: Alert and oriented, in no acute distress   HEENT: Pupils equal and reactive to light and accommodation, moist mucous membranes  Neck exam:  Supple.  Normal thyroid trachea midline, no JVD  Heart exam: Regular rate and rhythm no murmurs no S3 no S4   Lung exam: No rales no rhonchi no wheezes  Abdominal exam: Soft, nontender, nondistended, positive bowel sounds are normoactive, obese abdomen  Extremities exam: no clubbing, no cyanosis, no edema  Skin exam: No obvious wounds, no rashes  Neurological exam: Cranial nerves II through XII  intact, no gross deficits  Musculoskeletal exam: Moderate bilateral generalized hand arthritis appreciated, no obvious deformity    ASSESSMENT AND PLAN:   Fidelia Villar is a 79 year old female who presents with the followin. left L4-5-S1 chronic and right S1 chronic radiculopathy  I have messaged Dr. holman, lets see what he says about the pain control regimen, and what the next steps may be, it may be about the treatment with Tylenol 3, but we can let him guide this    2. Stage 3a chronic kidney disease (HCC)  Stable and doing well, lets recheck this on lab work in the upcoming future    3. Benign hypertension  Good control here, nice job continue current monitoring management    4. Peripheral polyneuropathy  Stable continue current monitoring management, medications    5. Hyperlipidemia, unspecified hyperlipidemia type  Aggressive statin medications  - CBC With Differential With Platelet  - Comp Metabolic Panel (14)    6. Age-related osteoporosis without current pathological fracture  Stable continue current monitoring management    7. Smoking 1/2 pack a day or less  Cessation as discussed    8. History of UTI  Lets retest the urine here, seeing if we need to retreat again here, let me know about any recurrent symptoms  - Urinalysis with Culture Reflex      Spent 30 minutes obtaining history, evaluating patient, discussing treatment options, diet, exercise, review of available labs and radiology reports, and completing documentation.    Jeff Anthony D'Amico, DO  2025  12:17 AM

## 2025-04-16 NOTE — PROGRESS NOTES
Low Back Pain H & P    Chief Complaint:   Chief Complaint   Patient presents with    Follow - Up     LOV 11/20/24 Patient is here to follow up on the low back pain. Patient states the pain is an ache in the low back that radiates with spasms in the left leg. Denies N/T. Admits weakness. Denies PT. Admits Gabapentin, Norco, Temzepam and Duloxetine taken daily.      Nursing note reviewed and verified.    Patient was last seen on 11/20/2024.  She never did have the left L4 and L5 TFESI's rthat were ordered since the last time that she had these, she got relief for only one week.  She states that her pain is worse now.  She has been taking Tylenol #3 at night.  She will take regular Tylenol BID.  She is taking 800 mg of the gabapentin BID.  She is taking the Robaxin 750 mg TID which is helping her.  She states that the left low back and leg pain causes her to jump.      Description of the Pain  The pain is located in the left low back.    The pain radiates to the left lateral hip, left anterior thigh, left posterior thigh, left lateral thigh, left medial thigh, left anterior lower leg, left posterior lower leg, left medial lower leg, and left lateral lower leg..  The pain is worse sitting, standing, and walking.    The pain is better with nothing.  There is numbness in the left toes of the foot when she is sleeping.  There is weakness in the left leg.     Past Medical History   Past Medical History[1]    Past Surgical History   Past Surgical History[2]    Family History   Family History[3]    Social History   Social History     Socioeconomic History    Marital status:      Spouse name: Not on file    Number of children: Not on file    Years of education: Not on file    Highest education level: Not on file   Occupational History    Not on file   Tobacco Use    Smoking status: Some Days     Current packs/day: 0.75     Average packs/day: 0.8 packs/day for 30.0 years (22.5 ttl pk-yrs)     Types: Cigarettes      Passive exposure: Current    Smokeless tobacco: Never   Vaping Use    Vaping status: Never Used   Substance and Sexual Activity    Alcohol use: No    Drug use: No    Sexual activity: Not on file   Other Topics Concern    Not on file   Social History Narrative    Not on file     Social Drivers of Health     Food Insecurity: Not on file   Transportation Needs: Not on file   Stress: Not on file   Housing Stability: Not on file       PE:  The patient does appear in her stated age in no distress.  The patient is well groomed.    Psychiatric:  The patient is alert and oriented x 3.  The patient has a normal affect and mood.      Respiratory:  No acute respiratory distress. Patient does not have a cough.    HEENT:  Extraocular muscles are intact. There is no kern icterus. Pupils are equal, round, and reactive to light. No redness or discharge bilaterally.    Skin:  There are no rashes or lesions.    Vitals:  There were no vitals filed for this visit.    Gait:    Gait: Normal gait   Sit to Stand: no difficulty      She has diffuse ballistic spasms intermittently.    Lumbar Spine:    Scoliosis: No scoliosis present, but moderately increased thoracic kyphosis and lumbar lordosis     Lumbar Spine Palpation:    Spinous Processes: Non-tender for all Spinous Processes   Z-joints: Tender at  left L3-4 and left L4-5   SIJ: Non-tender for bilateral SIJ   Piriformis Muscle: Non-tender bilateral Piriformis muscles   Greater Trochanteric Bursa: Non-tender for bilateral Greater Trochanteric Bursa     Vascular lower extremity:   Dorsalis pedis pulse-RIGHT 2+   Dorsalis pedis pulse-LEFT 2+     Neurological Lower Extremity:    Light Touch Sensation: Intact in bilateral LE except:  Increased in the lateral  medial Left thigh  lateral  medial  Left Lower Leg  arch of the LEFT foot  lateral LEFT foot   LE Muscle Strength: All LE strength measurements 5/5 except:  Hamstring RIGHT:   4+/5  Hamstring LEFT:   3/5  Hip Flexion LEFT:   2+/5  Knee  Extension LEFT:   2+/5  Extensor Hallucis Longus LEFT:   2+/5   RIGHT plantar reflexes: downward response   LEFT plantar reflexes: downward response   Reflexes: 2+ in bilateral lower extremities except:  Right Achilles tendon which are absent  Left Achilles tendon which are absent     Assessment  1. left L4-5-S1 chronic and right S1 chronic radiculopathy    2. L4-5 bilateral mod-severe, L3-4 left mod-severe/right severe, L2-3 left mod/right mod-severe foraminal stenosis    3. L4-5 mild central herniated disc    4. L1-2 mild diffuse & bilateral mod far lat, L2-3 right mod far lat & mild diffuse, L3-4 mod-large diffuse & left large far lat, L4-5 mild diffuse & left mod far lat, L5-S1 lt mild galilea bulging discs    5. C6-7 left mild foraminal, C7-T1 left moderate foraminal stenosis    6. C2-3 mild central, C3-4 mild diffuse, C5-6 mild diffuse, C6-7 right mild & left mild-mod foraminal, C7-T1 left mod foraminal & mild diffuse bulging discs    7. Lumbar post-laminectomy syndrome: L1-S1    8. L5-S1 grade 1 unstable, L4-5 grade 1 stable spondylolisthesis    9. Chronic right shoulder pain & s/p right rotator cuff repair x2    10. Severe obesity (BMI >= 40) ([facility])    11. Benign hypertension       Plan  She does not want to have the new MRI of the lumbar spine at this time.    She wants to hold on having the repeat left L4 and L5 TFESI's since these only helped for one week 2 years ago.    She will would like to try Baclofen 10 mg up to 3 times a day to see if this helps with her pain and muscle spasms.  She will stop taking the methocarbamol while she is taking the baclofen.    She will let me know in 2 weeks how she is doing.    She will keep the rest of her medications the same.    She will follow up in 3 months or sooner if needed.    Patient should call prior to next visit if new or worsening symptoms.     The patient understands and agrees with the stated plan.  Marcelino Chong MD  4/16/2025           [1]   Past Medical  History:   Atherosclerosis of coronary artery    Cataract    Coronary atherosclerosis    High cholesterol    HYPERLIPIDEMIA    OSTEOARTHRITIS    knees R>L    Osteoarthritis    OSTEOPOROSIS    Visual impairment    glasses   [2]   Past Surgical History:  Procedure Laterality Date    Appendectomy      Cataract      bilat    Cath bare metal stent (bms)      Cath percutaneous  transluminal coronary angioplasty      Colonoscopy  02/2009    adenomatous polyp--due in '12    Coronary stent placement  09/06/2024    Prox and Distal RCA    Other surgical history      CTS RELEASE BILAT,     Other surgical history      R rotator repair x 2   [3]   Family History  Problem Relation Age of Onset    Heart Disease Father     Heart Disorder Father         CAD    Other (Other) Mother         DJD    Diabetes Brother     Breast Cancer Sister 73

## 2025-04-16 NOTE — PATIENT INSTRUCTIONS
Plan  She does not want to have the new MRI of the lumbar spine at this time.    She wants to hold on having the repeat left L4 and L5 TFESI's since these only helped for one week 2 years ago.    She will would like to try Baclofen 10 mg up to 3 times a day to see if this helps with her pain and muscle spasms.    She will let me know in 2 weeks how she is doing.    She will keep the rest of her medications the same.    She will follow up in 3 months or sooner if needed.    Patient should call prior to next visit if new or worsening symptoms.

## 2025-04-17 ENCOUNTER — TELEPHONE (OUTPATIENT)
Dept: PHYSICAL MEDICINE AND REHAB | Facility: CLINIC | Age: 80
End: 2025-04-17

## 2025-04-17 RX ORDER — ACETAMINOPHEN AND CODEINE PHOSPHATE 300; 30 MG/1; MG/1
1 TABLET ORAL EVERY 6 HOURS PRN
Qty: 30 TABLET | Refills: 0 | Status: SHIPPED | OUTPATIENT
Start: 2025-04-17

## 2025-04-21 ENCOUNTER — TELEPHONE (OUTPATIENT)
Dept: PHYSICAL MEDICINE AND REHAB | Facility: CLINIC | Age: 80
End: 2025-04-21

## 2025-04-21 ENCOUNTER — MED REC SCAN ONLY (OUTPATIENT)
Dept: PHYSICAL MEDICINE AND REHAB | Facility: CLINIC | Age: 80
End: 2025-04-21

## 2025-05-05 ENCOUNTER — TELEPHONE (OUTPATIENT)
Dept: PHYSICAL MEDICINE AND REHAB | Facility: CLINIC | Age: 80
End: 2025-05-05

## 2025-05-12 ENCOUNTER — TELEPHONE (OUTPATIENT)
Dept: PHYSICAL MEDICINE AND REHAB | Facility: CLINIC | Age: 80
End: 2025-05-12

## 2025-05-12 DIAGNOSIS — Z86.16 HISTORY OF COVID-19: ICD-10-CM

## 2025-05-12 DIAGNOSIS — M54.16 LUMBAR RADICULOPATHY: ICD-10-CM

## 2025-05-12 NOTE — TELEPHONE ENCOUNTER
Pt called to request refills for Temazepam 7.5 Mg and Acetaminophen - Codeine 300 Mg sent to [facility] in [place]

## 2025-05-14 NOTE — TELEPHONE ENCOUNTER
Refill Request    Medication request: acetaminophen-codeine 300-30 MG Oral Tab. Take 1 tablet by mouth every 6 (six) hours as needed for Pain.     LOV:4/16/2025 Marcelino Chong MD   Due back to clinic per last office note: Per Dr. Chong: \"She will follow up in 3 months or sooner if needed.\"  NOV: Visit date not found      [place]: 04/16/2025 #30    Urine drug screen (if applicable): n/a  Pain contract: n/a    LOV plan (if weaning or changing medications): Per Dr. Chong: \"She will keep the rest of her medications the same.\"    Medication request: temazepam 7.5 MG Oral Cap. Take 1 capsule (7.5 mg total) by mouth nightly as needed for Sleep.      [place]: 03/18/2025 #30    LOV plan (if weaning or changing medications): see above for further details if needed.

## 2025-05-16 RX ORDER — TEMAZEPAM 7.5 MG/1
7.5 CAPSULE ORAL NIGHTLY PRN
Qty: 30 CAPSULE | Refills: 0 | Status: SHIPPED | OUTPATIENT
Start: 2025-05-16

## 2025-05-16 RX ORDER — ACETAMINOPHEN AND CODEINE PHOSPHATE 300; 30 MG/1; MG/1
1 TABLET ORAL EVERY 6 HOURS PRN
Qty: 30 TABLET | Refills: 0 | Status: SHIPPED | OUTPATIENT
Start: 2025-05-16

## 2025-05-16 NOTE — TELEPHONE ENCOUNTER
This patient needs to have a follow up as per the last office note.     Tremfya Counseling: I discussed with the patient the risks of guselkumab including but not limited to immunosuppression, serious infections, and drug reactions.  The patient understands that monitoring is required including a PPD at baseline and must alert us or the primary physician if symptoms of infection or other concerning signs are noted.

## 2025-05-19 NOTE — TELEPHONE ENCOUNTER
Received message to prescriber re: acetaminophen rx. Placed in RN folder. Please advise, thank you.

## 2025-05-27 DIAGNOSIS — M54.2 NECK PAIN: ICD-10-CM

## 2025-05-27 NOTE — TELEPHONE ENCOUNTER
Patient called, requesting refill for:  Gabapentin  Duloxetine 30MG   Patient uses [facility] in [place]  Patient was told no refills available on both  Tasked to RX

## 2025-05-28 RX ORDER — DULOXETIN HYDROCHLORIDE 30 MG/1
30 CAPSULE, DELAYED RELEASE ORAL DAILY
Qty: 90 CAPSULE | Refills: 0 | Status: SHIPPED | OUTPATIENT
Start: 2025-05-28

## 2025-05-28 RX ORDER — DULOXETIN HYDROCHLORIDE 30 MG/1
30 CAPSULE, DELAYED RELEASE ORAL DAILY
Qty: 90 CAPSULE | Refills: 1 | OUTPATIENT
Start: 2025-05-28

## 2025-05-28 RX ORDER — GABAPENTIN 400 MG/1
800 CAPSULE ORAL 2 TIMES DAILY
Qty: 180 CAPSULE | Refills: 1 | Status: SHIPPED | OUTPATIENT
Start: 2025-05-28

## 2025-05-28 NOTE — TELEPHONE ENCOUNTER
Refill request is for a maintenance medication and has met the criteria specified in the Ambulatory Medication Refill Standing Order for eligibility, visits, laboratory, alerts and was sent to the requested pharmacy.    Requested Prescriptions     Signed Prescriptions Disp Refills    gabapentin 400 MG Oral Cap 180 capsule 1     Sig: Take 2 capsules (800 mg total) by mouth 2 (two) times daily.     Authorizing Provider: D'AMICO, JEFF ANTHONY     Ordering User: ZEKE CONROY    DULoxetine 30 MG Oral Cap DR Particles 90 capsule 0     Sig: Take 1 capsule (30 mg total) by mouth daily.     Authorizing Provider: D'AMICO, JEFF ANTHONY     Ordering User: ZEKE CONROY

## 2025-05-28 NOTE — TELEPHONE ENCOUNTER
Current refill request refused due to refill is either a duplicate request or has active refills at the pharmacy.  Check previous templates.    Requested Prescriptions     Refused Prescriptions Disp Refills    DULOXETINE 30 MG Oral Cap DR Particles [Pharmacy Med Name: DULOXETINE DR 30MG CAPSULES] 90 capsule 1     Sig: TAKE 1 CAPSULE(30 MG) BY MOUTH DAILY     Refused By: ZEKE CONROY     Reason for Refusal: Duplicate refill request

## 2025-06-12 DIAGNOSIS — M54.16 LUMBAR RADICULOPATHY: ICD-10-CM

## 2025-06-12 DIAGNOSIS — Z86.16 HISTORY OF COVID-19: ICD-10-CM

## 2025-06-12 NOTE — TELEPHONE ENCOUNTER
Patient doesn't know how to work her Mychart she is requesting a refill for her:    temazepam 7.5 MG Oral Cap     Can someone assist her with this please.   Patient just had labs with Dr. Denis Knott ordered.

## 2025-06-12 NOTE — TELEPHONE ENCOUNTER
Refill Request    Medication request: temazepam 7.5 MG Oral Cap   Take 1 capsule (7.5 mg total) by mouth nightly as needed for Sleep.     LOV:4/16/2025 Marcelino Chong MD   Due back to clinic per last office note:  \"She will follow up in 3 months or sooner if needed. \"  NOV: Visit date not found      [place]: 5/13/25 #30 - 30 day supply    Urine drug screen (if applicable): n/a  Pain contract: n/a    LOV plan (if weaning or changing medications): Per  at last office visit: \"She will would like to try Baclofen 10 mg up to 3 times a day to see if this helps with her pain and muscle spasms.  She will stop taking the methocarbamol while she is taking the baclofen. \"    No mention of temazepam.

## 2025-06-14 RX ORDER — TEMAZEPAM 7.5 MG/1
7.5 CAPSULE ORAL NIGHTLY PRN
Qty: 30 CAPSULE | Refills: 0 | Status: SHIPPED | OUTPATIENT
Start: 2025-06-14 | End: 2025-07-15

## 2025-06-23 DIAGNOSIS — E78.5 HYPERLIPIDEMIA, UNSPECIFIED HYPERLIPIDEMIA TYPE: ICD-10-CM

## 2025-06-23 RX ORDER — ATORVASTATIN CALCIUM 80 MG/1
80 TABLET, FILM COATED ORAL NIGHTLY
Qty: 90 TABLET | Refills: 3 | OUTPATIENT
Start: 2025-06-23

## 2025-06-23 NOTE — TELEPHONE ENCOUNTER
Refill request has failed the Ambulatory Medication Refill Standing Order and is routed to the primary physician to review the following:    Requested Prescriptions     Refused Prescriptions Disp Refills    atorvastatin 80 MG Oral Tab 90 tablet 3     Sig: Take 1 tablet (80 mg total) by mouth nightly.       Medication ordered by Dr. Soriano

## 2025-07-15 DIAGNOSIS — Z86.16 HISTORY OF COVID-19: ICD-10-CM

## 2025-07-15 NOTE — TELEPHONE ENCOUNTER
RN spoke with pt who would like to request refills on her Tylenol #3 and temazepam.     RN explained to patient that Dr. Chong is requesting an appointment be scheduled for medication review. Pt verbalized understanding and agreed on follow up appointment in [place] on 8/4/25 at 10am with Dr. Chong. RN scheduled per protocol and advised pt to try to keep this appointment as scheduled or reschedule for sooner, as not keeping this appt can result in delayed or refused refills. Pt verbalized understanding.     Refill Request  Requested Prescriptions     Pending Prescriptions Disp Refills    temazepam 7.5 MG Oral Cap 30 capsule 0     Sig: Take 1 capsule (7.5 mg total) by mouth nightly as needed for Sleep.    acetaminophen-codeine 300-30 MG Oral Tab 30 tablet 0     Sig: Take 1 tablet by mouth every 6 (six) hours as needed for Pain.           LOV:4/16/2025 Marcelino Chong MD   Due back to clinic per last office note:  3 months or sooner if needed  NOV: 8/4/2025 Marcelino Chong MD     Urine drug screen (if applicable): none  Pain contract: none     Per 5/12/25 refill encounter:       Medication request: Temazepam 7.5mg nightly PRN      [place] 6/14/25 #30 - 30 days supply        Medication request: Tylenol #3 q6h PRN      [place]: 5/25/25 #30 - 7 days supply

## 2025-07-15 NOTE — TELEPHONE ENCOUNTER
RN received message from PSR: \"Patient for  here in the front office looking to speak to a nurse as she has been trying and trying to get to a nurse and no one has  her calls since yesterday.\" PSR informed RN patient has questions about medication, but has another appt and would like call back this afternoon.     RN attempted to call x2 with no answer and no option for vm.     Per Dr. Chong in 5/12/25 refill encounter, \"She will need to be seen in the office to discuss her medications further, but she has been taking these medications for months without issues and therefore is ok to continue with them until her next office visit.\"    According to record review, patient should be taking:   - baclofen 10mg (TID PRN)  - tylenol #3 @ night  - tylenol BID  - Gabapentin 800mg BID  -Temazepam 7.5mg nightly PRN.     Will try to call back and follow up with patient.

## 2025-07-16 RX ORDER — TEMAZEPAM 7.5 MG/1
7.5 CAPSULE ORAL NIGHTLY PRN
Qty: 30 CAPSULE | Refills: 0 | Status: SHIPPED | OUTPATIENT
Start: 2025-07-16

## 2025-07-16 NOTE — TELEPHONE ENCOUNTER
Refill Request    Medication request: temazepam 7.5 MG Oral Cap.   Take 1 capsule (7.5 mg total) by mouth nightly as needed for Sleep.      LOV:4/16/2025 Marcelino Chong MD   Due back to clinic per last office note:    Return in about 3 months  NOV: 8/4/2025 Marcelino Chong MD      [place]: 6/14/25 #30 (30 days)    Urine drug screen (if applicable):N/A  Pain contract: N/A    LOV plan (if weaning or changing medications): She will keep the rest of her medications the same.

## 2025-07-19 RX ORDER — TEMAZEPAM 7.5 MG/1
7.5 CAPSULE ORAL NIGHTLY PRN
Qty: 30 CAPSULE | Refills: 0 | Status: SHIPPED | OUTPATIENT
Start: 2025-07-19

## 2025-07-19 RX ORDER — ACETAMINOPHEN AND CODEINE PHOSPHATE 300; 30 MG/1; MG/1
1 TABLET ORAL EVERY 6 HOURS PRN
Qty: 30 TABLET | Refills: 0 | Status: SHIPPED | OUTPATIENT
Start: 2025-07-19

## 2025-08-04 ENCOUNTER — OFFICE VISIT (OUTPATIENT)
Dept: PHYSICAL MEDICINE AND REHAB | Facility: CLINIC | Age: 80
End: 2025-08-04

## 2025-08-04 VITALS
RESPIRATION RATE: 14 BRPM | DIASTOLIC BLOOD PRESSURE: 62 MMHG | OXYGEN SATURATION: 96 % | SYSTOLIC BLOOD PRESSURE: 120 MMHG | BODY MASS INDEX: 39.84 KG/M2 | HEIGHT: 61 IN | HEART RATE: 81 BPM | WEIGHT: 211 LBS

## 2025-08-04 DIAGNOSIS — M43.16 SPONDYLOLISTHESIS OF LUMBAR REGION: ICD-10-CM

## 2025-08-04 DIAGNOSIS — M51.9 LUMBAR DISC DISEASE: ICD-10-CM

## 2025-08-04 DIAGNOSIS — M50.90 CERVICAL DISC DISEASE: ICD-10-CM

## 2025-08-04 DIAGNOSIS — I10 BENIGN HYPERTENSION: ICD-10-CM

## 2025-08-04 DIAGNOSIS — E66.01 SEVERE OBESITY (BMI >= 40) (HCC): Chronic | ICD-10-CM

## 2025-08-04 DIAGNOSIS — G60.8 SENSORY PERIPHERAL NEUROPATHY: ICD-10-CM

## 2025-08-04 DIAGNOSIS — M51.26 LUMBAR HERNIATED DISC: ICD-10-CM

## 2025-08-04 DIAGNOSIS — M54.16 LUMBAR RADICULOPATHY: Primary | ICD-10-CM

## 2025-08-04 DIAGNOSIS — M48.061 LUMBAR FORAMINAL STENOSIS: ICD-10-CM

## 2025-08-04 DIAGNOSIS — M96.1 LUMBAR POST-LAMINECTOMY SYNDROME: ICD-10-CM

## 2025-08-04 DIAGNOSIS — M48.02 CERVICAL STENOSIS OF SPINE: ICD-10-CM

## 2025-08-04 PROCEDURE — 1159F MED LIST DOCD IN RCRD: CPT | Performed by: PHYSICAL MEDICINE & REHABILITATION

## 2025-08-04 PROCEDURE — 1160F RVW MEDS BY RX/DR IN RCRD: CPT | Performed by: PHYSICAL MEDICINE & REHABILITATION

## 2025-08-04 PROCEDURE — 99214 OFFICE O/P EST MOD 30 MIN: CPT | Performed by: PHYSICAL MEDICINE & REHABILITATION

## 2025-08-04 RX ORDER — ACETAMINOPHEN AND CODEINE PHOSPHATE 300; 30 MG/1; MG/1
1 TABLET ORAL EVERY 6 HOURS PRN
Qty: 30 TABLET | Refills: 0 | Status: SHIPPED | OUTPATIENT
Start: 2025-08-08

## 2025-08-04 RX ORDER — BACLOFEN 10 MG/1
10 TABLET ORAL 3 TIMES DAILY
Qty: 90 TABLET | Refills: 3 | Status: SHIPPED | OUTPATIENT
Start: 2025-08-04

## 2025-08-04 RX ORDER — GABAPENTIN 400 MG/1
800 CAPSULE ORAL 2 TIMES DAILY
Qty: 120 CAPSULE | Refills: 5 | Status: SHIPPED | OUTPATIENT
Start: 2025-08-04

## 2025-08-14 ENCOUNTER — OFFICE VISIT (OUTPATIENT)
Dept: INTERNAL MEDICINE CLINIC | Facility: CLINIC | Age: 80
End: 2025-08-14

## 2025-08-14 VITALS
WEIGHT: 214 LBS | SYSTOLIC BLOOD PRESSURE: 136 MMHG | OXYGEN SATURATION: 98 % | HEART RATE: 89 BPM | BODY MASS INDEX: 40.4 KG/M2 | HEIGHT: 61 IN | DIASTOLIC BLOOD PRESSURE: 79 MMHG

## 2025-08-14 DIAGNOSIS — Z76.89 ENCOUNTER TO ESTABLISH CARE: Primary | ICD-10-CM

## 2025-08-14 DIAGNOSIS — E78.5 HYPERLIPIDEMIA, UNSPECIFIED HYPERLIPIDEMIA TYPE: ICD-10-CM

## 2025-08-14 DIAGNOSIS — M54.42 CHRONIC LEFT-SIDED LOW BACK PAIN WITH LEFT-SIDED SCIATICA: ICD-10-CM

## 2025-08-14 DIAGNOSIS — G89.29 CHRONIC LEFT-SIDED LOW BACK PAIN WITH LEFT-SIDED SCIATICA: ICD-10-CM

## 2025-08-14 DIAGNOSIS — G89.29 CHRONIC PAIN OF LEFT KNEE: ICD-10-CM

## 2025-08-14 DIAGNOSIS — M25.562 CHRONIC PAIN OF LEFT KNEE: ICD-10-CM

## 2025-08-14 DIAGNOSIS — I25.10 CORONARY ARTERY DISEASE INVOLVING NATIVE CORONARY ARTERY OF NATIVE HEART WITHOUT ANGINA PECTORIS: ICD-10-CM

## 2025-08-14 DIAGNOSIS — I10 BENIGN HYPERTENSION: ICD-10-CM

## 2025-08-14 RX ORDER — FUROSEMIDE 20 MG/1
20 TABLET ORAL DAILY
COMMUNITY
Start: 2025-07-30

## 2025-08-14 RX ORDER — TRAMADOL HYDROCHLORIDE 50 MG/1
50 TABLET ORAL EVERY 6 HOURS PRN
COMMUNITY

## 2025-08-16 ENCOUNTER — LAB ENCOUNTER (OUTPATIENT)
Dept: LAB | Age: 80
End: 2025-08-16
Attending: INTERNAL MEDICINE

## 2025-08-16 DIAGNOSIS — G89.29 CHRONIC PAIN OF LEFT KNEE: ICD-10-CM

## 2025-08-16 DIAGNOSIS — I25.10 CORONARY ARTERY DISEASE INVOLVING NATIVE CORONARY ARTERY OF NATIVE HEART WITHOUT ANGINA PECTORIS: ICD-10-CM

## 2025-08-16 DIAGNOSIS — Z76.89 ENCOUNTER TO ESTABLISH CARE: ICD-10-CM

## 2025-08-16 DIAGNOSIS — M25.562 CHRONIC PAIN OF LEFT KNEE: ICD-10-CM

## 2025-08-16 DIAGNOSIS — E78.5 HYPERLIPIDEMIA, UNSPECIFIED HYPERLIPIDEMIA TYPE: ICD-10-CM

## 2025-08-16 DIAGNOSIS — I10 BENIGN HYPERTENSION: ICD-10-CM

## 2025-08-16 DIAGNOSIS — G89.29 CHRONIC LEFT-SIDED LOW BACK PAIN WITH LEFT-SIDED SCIATICA: ICD-10-CM

## 2025-08-16 DIAGNOSIS — M54.42 CHRONIC LEFT-SIDED LOW BACK PAIN WITH LEFT-SIDED SCIATICA: ICD-10-CM

## 2025-08-16 LAB
ALBUMIN SERPL-MCNC: 4.8 G/DL (ref 3.2–4.8)
ALBUMIN/GLOB SERPL: 1.8 (ref 1–2)
ALP LIVER SERPL-CCNC: 70 U/L (ref 55–142)
ALT SERPL-CCNC: 12 U/L (ref 10–49)
ANION GAP SERPL CALC-SCNC: 7 MMOL/L (ref 0–18)
AST SERPL-CCNC: 17 U/L (ref ?–34)
BILIRUB SERPL-MCNC: 0.5 MG/DL (ref 0.2–1.1)
BILIRUB UR QL: NEGATIVE
BUN BLD-MCNC: 12 MG/DL (ref 9–23)
BUN/CREAT SERPL: 16.4 (ref 10–20)
CALCIUM BLD-MCNC: 9.5 MG/DL (ref 8.7–10.4)
CHLORIDE SERPL-SCNC: 107 MMOL/L (ref 98–112)
CHOLEST SERPL-MCNC: 116 MG/DL (ref ?–200)
CLARITY UR: CLEAR
CO2 SERPL-SCNC: 24 MMOL/L (ref 21–32)
CREAT BLD-MCNC: 0.73 MG/DL (ref 0.55–1.02)
EGFRCR SERPLBLD CKD-EPI 2021: 82 ML/MIN/1.73M2 (ref 60–?)
FASTING PATIENT LIPID ANSWER: YES
FASTING STATUS PATIENT QL REPORTED: YES
GLOBULIN PLAS-MCNC: 2.6 G/DL (ref 2–3.5)
GLUCOSE BLD-MCNC: 117 MG/DL (ref 70–99)
GLUCOSE UR-MCNC: NORMAL MG/DL
HDLC SERPL-MCNC: 42 MG/DL (ref 40–59)
HGB UR QL STRIP.AUTO: NEGATIVE
KETONES UR-MCNC: NEGATIVE MG/DL
LDLC SERPL CALC-MCNC: 56 MG/DL (ref ?–100)
LEUKOCYTE ESTERASE UR QL STRIP.AUTO: 250
NONHDLC SERPL-MCNC: 74 MG/DL (ref ?–130)
OSMOLALITY SERPL CALC.SUM OF ELEC: 287 MOSM/KG (ref 275–295)
PH UR: 6.5 (ref 5–8)
POTASSIUM SERPL-SCNC: 4.2 MMOL/L (ref 3.5–5.1)
PROT SERPL-MCNC: 7.4 G/DL (ref 5.7–8.2)
PROT UR-MCNC: NEGATIVE MG/DL
SODIUM SERPL-SCNC: 138 MMOL/L (ref 136–145)
SP GR UR STRIP: 1.01 (ref 1–1.03)
TRIGL SERPL-MCNC: 96 MG/DL (ref 30–149)
TSI SER-ACNC: 3.1 UIU/ML (ref 0.55–4.78)
UROBILINOGEN UR STRIP-ACNC: NORMAL
VLDLC SERPL CALC-MCNC: 14 MG/DL (ref 0–30)
WBC CLUMPS UR QL AUTO: PRESENT /HPF

## 2025-08-16 PROCEDURE — 80053 COMPREHEN METABOLIC PANEL: CPT

## 2025-08-16 PROCEDURE — 85025 COMPLETE CBC W/AUTO DIFF WBC: CPT

## 2025-08-16 PROCEDURE — 36415 COLL VENOUS BLD VENIPUNCTURE: CPT

## 2025-08-16 PROCEDURE — 80061 LIPID PANEL: CPT

## 2025-08-16 PROCEDURE — 81001 URINALYSIS AUTO W/SCOPE: CPT

## 2025-08-16 PROCEDURE — 84443 ASSAY THYROID STIM HORMONE: CPT

## 2025-08-16 PROCEDURE — 85060 BLOOD SMEAR INTERPRETATION: CPT

## 2025-08-18 DIAGNOSIS — N39.0 URINARY TRACT INFECTION WITHOUT HEMATURIA, SITE UNSPECIFIED: ICD-10-CM

## 2025-08-18 DIAGNOSIS — D64.9 ANEMIA, UNSPECIFIED TYPE: Primary | ICD-10-CM

## 2025-08-18 LAB
BASOPHILS # BLD AUTO: 0.11 X10(3) UL (ref 0–0.2)
BASOPHILS NFR BLD AUTO: 1 %
DEPRECATED RDW RBC AUTO: 49.9 FL (ref 35.1–46.3)
EOSINOPHIL # BLD AUTO: 0.26 X10(3) UL (ref 0–0.7)
EOSINOPHIL NFR BLD AUTO: 2.4 %
ERYTHROCYTE [DISTWIDTH] IN BLOOD BY AUTOMATED COUNT: 21.3 % (ref 11–15)
HCT VFR BLD AUTO: 35.9 % (ref 35–48)
HGB BLD-MCNC: 10.5 G/DL (ref 12–16)
IMM GRANULOCYTES # BLD AUTO: 0.06 X10(3) UL (ref 0–1)
IMM GRANULOCYTES NFR BLD: 0.5 %
LYMPHOCYTES # BLD AUTO: 2.55 X10(3) UL (ref 1–4)
LYMPHOCYTES NFR BLD AUTO: 23.1 %
MCH RBC QN AUTO: 19.8 PG (ref 26–34)
MCHC RBC AUTO-ENTMCNC: 29.2 G/DL (ref 31–37)
MCV RBC AUTO: 67.9 FL (ref 80–100)
MONOCYTES # BLD AUTO: 1.04 X10(3) UL (ref 0.1–1)
MONOCYTES NFR BLD AUTO: 9.4 %
NEUTROPHILS # BLD AUTO: 7.03 X10 (3) UL (ref 1.5–7.7)
NEUTROPHILS # BLD AUTO: 7.03 X10(3) UL (ref 1.5–7.7)
NEUTROPHILS NFR BLD AUTO: 63.6 %
PLATELET # BLD AUTO: 458 10(3)UL (ref 150–450)
RBC # BLD AUTO: 5.29 X10(6)UL (ref 3.8–5.3)
WBC # BLD AUTO: 11.1 X10(3) UL (ref 4–11)

## 2025-08-18 RX ORDER — NITROFURANTOIN 25; 75 MG/1; MG/1
100 CAPSULE ORAL 2 TIMES DAILY
Qty: 10 CAPSULE | Refills: 0 | Status: SHIPPED | OUTPATIENT
Start: 2025-08-18 | End: 2025-08-23

## 2025-08-19 ENCOUNTER — OFFICE VISIT (OUTPATIENT)
Dept: INTERNAL MEDICINE CLINIC | Facility: CLINIC | Age: 80
End: 2025-08-19

## 2025-08-19 ENCOUNTER — TELEPHONE (OUTPATIENT)
Dept: INTERNAL MEDICINE CLINIC | Facility: CLINIC | Age: 80
End: 2025-08-19

## 2025-08-19 ENCOUNTER — HOSPITAL ENCOUNTER (INPATIENT)
Facility: HOSPITAL | Age: 80
LOS: 2 days | Discharge: HOME | End: 2025-08-21
Attending: EMERGENCY MEDICINE | Admitting: HOSPITALIST

## 2025-08-19 ENCOUNTER — APPOINTMENT (OUTPATIENT)
Dept: GENERAL RADIOLOGY | Facility: HOSPITAL | Age: 80
End: 2025-08-19
Attending: EMERGENCY MEDICINE

## 2025-08-19 VITALS
RESPIRATION RATE: 17 BRPM | SYSTOLIC BLOOD PRESSURE: 90 MMHG | TEMPERATURE: 98 F | HEIGHT: 61 IN | DIASTOLIC BLOOD PRESSURE: 62 MMHG | HEART RATE: 69 BPM | BODY MASS INDEX: 40.97 KG/M2 | OXYGEN SATURATION: 87 % | WEIGHT: 217 LBS

## 2025-08-19 DIAGNOSIS — D64.9 ANEMIA, UNSPECIFIED TYPE: ICD-10-CM

## 2025-08-19 DIAGNOSIS — R09.02 HYPOXIA: Primary | ICD-10-CM

## 2025-08-19 DIAGNOSIS — I50.9 ACUTE ON CHRONIC CONGESTIVE HEART FAILURE, UNSPECIFIED HEART FAILURE TYPE (HCC): ICD-10-CM
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
## 2025-08-19 DIAGNOSIS — I95.9 HYPOTENSION, UNSPECIFIED HYPOTENSION TYPE: Primary | ICD-10-CM

## 2025-08-19 DIAGNOSIS — R09.02 HYPOXIA: ICD-10-CM

## 2025-08-19 PROBLEM — J44.1 COPD EXACERBATION (HCC): Status: ACTIVE | Noted: 2025-08-19

## 2025-08-19 LAB
ANION GAP SERPL CALC-SCNC: 5 MMOL/L (ref 0–18)
ATRIAL RATE: 68 BPM
BASOPHILS # BLD AUTO: 0.08 X10(3) UL (ref 0–0.2)
BASOPHILS NFR BLD AUTO: 0.7 %
BILIRUB UR QL: NEGATIVE
BUN BLD-MCNC: 14 MG/DL (ref 9–23)
BUN/CREAT SERPL: 19.4 (ref 10–20)
CALCIUM BLD-MCNC: 8.7 MG/DL (ref 8.7–10.4)
CHLORIDE SERPL-SCNC: 107 MMOL/L (ref 98–112)
CLARITY UR: CLEAR
CO2 SERPL-SCNC: 29 MMOL/L (ref 21–32)
COLOR UR: COLORLESS
CREAT BLD-MCNC: 0.72 MG/DL (ref 0.55–1.02)
DEPRECATED RDW RBC AUTO: 52.2 FL (ref 35.1–46.3)
EGFRCR SERPLBLD CKD-EPI 2021: 83 ML/MIN/1.73M2 (ref 60–?)
EOSINOPHIL # BLD AUTO: 0.26 X10(3) UL (ref 0–0.7)
EOSINOPHIL NFR BLD AUTO: 2.4 %
ERYTHROCYTE [DISTWIDTH] IN BLOOD BY AUTOMATED COUNT: 20.8 % (ref 11–15)
FLUAV + FLUBV RNA SPEC NAA+PROBE: NEGATIVE
FLUAV + FLUBV RNA SPEC NAA+PROBE: NEGATIVE
GLUCOSE BLD-MCNC: 107 MG/DL (ref 70–99)
GLUCOSE UR-MCNC: NORMAL MG/DL
HCT VFR BLD AUTO: 31.5 % (ref 35–48)
HGB BLD-MCNC: 9 G/DL (ref 12–16)
HGB UR QL STRIP.AUTO: NEGATIVE
IMM GRANULOCYTES # BLD AUTO: 0.06 X10(3) UL (ref 0–1)
IMM GRANULOCYTES NFR BLD: 0.6 %
INR BLD: 1 (ref 0.8–1.2)
KETONES UR-MCNC: NEGATIVE MG/DL
LEUKOCYTE ESTERASE UR QL STRIP.AUTO: NEGATIVE
LYMPHOCYTES # BLD AUTO: 2.21 X10(3) UL (ref 1–4)
LYMPHOCYTES NFR BLD AUTO: 20.7 %
MAGNESIUM SERPL-MCNC: 2.2 MG/DL (ref 1.6–2.6)
MCH RBC QN AUTO: 20.4 PG (ref 26–34)
MCHC RBC AUTO-ENTMCNC: 28.6 G/DL (ref 31–37)
MCV RBC AUTO: 71.4 FL (ref 80–100)
MONOCYTES # BLD AUTO: 1.07 X10(3) UL (ref 0.1–1)
MONOCYTES NFR BLD AUTO: 10 %
NEUTROPHILS # BLD AUTO: 7 X10 (3) UL (ref 1.5–7.7)
NEUTROPHILS # BLD AUTO: 7 X10(3) UL (ref 1.5–7.7)
NEUTROPHILS NFR BLD AUTO: 65.6 %
NITRITE UR QL STRIP.AUTO: NEGATIVE
NT-PROBNP SERPL-MCNC: 687 PG/ML (ref ?–450)
OSMOLALITY SERPL CALC.SUM OF ELEC: 293 MOSM/KG (ref 275–295)
P AXIS: 40 DEGREES
P-R INTERVAL: 128 MS
PH UR: 6.5 (ref 5–8)
PLATELET # BLD AUTO: 388 10(3)UL (ref 150–450)
POTASSIUM SERPL-SCNC: 4.2 MMOL/L (ref 3.5–5.1)
PROT UR-MCNC: NEGATIVE MG/DL
PROTHROMBIN TIME: 14.2 SECONDS (ref 11.6–14.8)
Q-T INTERVAL: 434 MS
QRS DURATION: 130 MS
QTC CALCULATION (BEZET): 461 MS
R AXIS: 15 DEGREES
RBC # BLD AUTO: 4.41 X10(6)UL (ref 3.8–5.3)
RSV RNA SPEC NAA+PROBE: NEGATIVE
SARS-COV-2 RNA RESP QL NAA+PROBE: NOT DETECTED
SODIUM SERPL-SCNC: 141 MMOL/L (ref 136–145)
SP GR UR STRIP: 1.01 (ref 1–1.03)
T AXIS: -8 DEGREES
TROPONIN I SERPL HS-MCNC: 8 NG/L (ref ?–34)
UROBILINOGEN UR STRIP-ACNC: NORMAL
VENTRICULAR RATE: 68 BPM
WBC # BLD AUTO: 10.7 X10(3) UL (ref 4–11)

## 2025-08-19 PROCEDURE — 1125F AMNT PAIN NOTED PAIN PRSNT: CPT | Performed by: INTERNAL MEDICINE

## 2025-08-19 PROCEDURE — 99223 1ST HOSP IP/OBS HIGH 75: CPT | Performed by: INTERNAL MEDICINE

## 2025-08-19 PROCEDURE — 71045 X-RAY EXAM CHEST 1 VIEW: CPT | Performed by: EMERGENCY MEDICINE

## 2025-08-19 PROCEDURE — 99223 1ST HOSP IP/OBS HIGH 75: CPT | Performed by: HOSPITALIST

## 2025-08-19 PROCEDURE — 99214 OFFICE O/P EST MOD 30 MIN: CPT | Performed by: INTERNAL MEDICINE

## 2025-08-19 RX ORDER — TEMAZEPAM 7.5 MG/1
7.5 CAPSULE ORAL NIGHTLY PRN
Status: DISCONTINUED | OUTPATIENT
Start: 2025-08-19 | End: 2025-08-21

## 2025-08-19 RX ORDER — DULOXETIN HYDROCHLORIDE 60 MG/1
60 CAPSULE, DELAYED RELEASE ORAL EVERY EVENING
Status: DISCONTINUED | OUTPATIENT
Start: 2025-08-19 | End: 2025-08-21

## 2025-08-19 RX ORDER — ACETAMINOPHEN AND CODEINE PHOSPHATE 300; 30 MG/1; MG/1
1 TABLET ORAL EVERY 6 HOURS PRN
Status: DISCONTINUED | OUTPATIENT
Start: 2025-08-19 | End: 2025-08-21

## 2025-08-19 RX ORDER — METOCLOPRAMIDE HYDROCHLORIDE 5 MG/ML
5 INJECTION INTRAMUSCULAR; INTRAVENOUS EVERY 8 HOURS PRN
Status: DISCONTINUED | OUTPATIENT
Start: 2025-08-19 | End: 2025-08-21

## 2025-08-19 RX ORDER — TRAMADOL HYDROCHLORIDE 50 MG/1
50 TABLET ORAL EVERY 6 HOURS PRN
Status: DISCONTINUED | OUTPATIENT
Start: 2025-08-19 | End: 2025-08-21

## 2025-08-19 RX ORDER — ALBUTEROL SULFATE 0.83 MG/ML
5 SOLUTION RESPIRATORY (INHALATION) ONCE
Status: COMPLETED | OUTPATIENT
Start: 2025-08-19 | End: 2025-08-19

## 2025-08-19 RX ORDER — ATORVASTATIN CALCIUM 80 MG/1
80 TABLET, FILM COATED ORAL NIGHTLY
Status: DISCONTINUED | OUTPATIENT
Start: 2025-08-19 | End: 2025-08-21

## 2025-08-19 RX ORDER — ASPIRIN 81 MG/1
81 TABLET, CHEWABLE ORAL DAILY
Status: DISCONTINUED | OUTPATIENT
Start: 2025-08-20 | End: 2025-08-21

## 2025-08-19 RX ORDER — ACETAMINOPHEN 500 MG
500 TABLET ORAL EVERY 4 HOURS PRN
Status: DISCONTINUED | OUTPATIENT
Start: 2025-08-19 | End: 2025-08-21

## 2025-08-19 RX ORDER — METHYLPREDNISOLONE SODIUM SUCCINATE 125 MG/2ML
60 INJECTION INTRAMUSCULAR; INTRAVENOUS ONCE
Status: COMPLETED | OUTPATIENT
Start: 2025-08-19 | End: 2025-08-19

## 2025-08-19 RX ORDER — METOPROLOL SUCCINATE 25 MG/1
25 TABLET, EXTENDED RELEASE ORAL DAILY
Status: DISCONTINUED | OUTPATIENT
Start: 2025-08-20 | End: 2025-08-21

## 2025-08-19 RX ORDER — MORPHINE SULFATE 4 MG/ML
4 INJECTION, SOLUTION INTRAMUSCULAR; INTRAVENOUS ONCE
Status: COMPLETED | OUTPATIENT
Start: 2025-08-19 | End: 2025-08-19

## 2025-08-19 RX ORDER — ONDANSETRON 2 MG/ML
4 INJECTION INTRAMUSCULAR; INTRAVENOUS EVERY 6 HOURS PRN
Status: DISCONTINUED | OUTPATIENT
Start: 2025-08-19 | End: 2025-08-21

## 2025-08-19 RX ORDER — IPRATROPIUM BROMIDE AND ALBUTEROL SULFATE 2.5; .5 MG/3ML; MG/3ML
3 SOLUTION RESPIRATORY (INHALATION) EVERY 6 HOURS PRN
Status: DISCONTINUED | OUTPATIENT
Start: 2025-08-19 | End: 2025-08-21

## 2025-08-19 RX ORDER — DULOXETIN HYDROCHLORIDE 30 MG/1
30 CAPSULE, DELAYED RELEASE ORAL DAILY
Status: DISCONTINUED | OUTPATIENT
Start: 2025-08-20 | End: 2025-08-21

## 2025-08-19 RX ORDER — BACLOFEN 10 MG/1
10 TABLET ORAL 3 TIMES DAILY PRN
Status: DISCONTINUED | OUTPATIENT
Start: 2025-08-19 | End: 2025-08-21

## 2025-08-19 RX ORDER — FUROSEMIDE 10 MG/ML
40 INJECTION INTRAMUSCULAR; INTRAVENOUS ONCE
Status: COMPLETED | OUTPATIENT
Start: 2025-08-19 | End: 2025-08-19

## 2025-08-19 RX ORDER — FUROSEMIDE 10 MG/ML
40 INJECTION INTRAMUSCULAR; INTRAVENOUS
Status: DISCONTINUED | OUTPATIENT
Start: 2025-08-19 | End: 2025-08-21

## 2025-08-19 RX ORDER — LATANOPROST 50 UG/ML
1 SOLUTION/ DROPS OPHTHALMIC DAILY
Status: DISCONTINUED | OUTPATIENT
Start: 2025-08-20 | End: 2025-08-21

## 2025-08-19 RX ORDER — METHYLPREDNISOLONE SODIUM SUCCINATE 40 MG/ML
40 INJECTION INTRAMUSCULAR; INTRAVENOUS EVERY 8 HOURS
Status: DISCONTINUED | OUTPATIENT
Start: 2025-08-19 | End: 2025-08-20

## 2025-08-19 RX ORDER — CLOPIDOGREL BISULFATE 75 MG/1
75 TABLET ORAL DAILY
Status: DISCONTINUED | OUTPATIENT
Start: 2025-08-20 | End: 2025-08-21

## 2025-08-19 RX ORDER — AMLODIPINE BESYLATE 2.5 MG/1
2.5 TABLET ORAL EVERY MORNING
Status: DISCONTINUED | OUTPATIENT
Start: 2025-08-20 | End: 2025-08-21

## 2025-08-19 RX ORDER — ENOXAPARIN SODIUM 100 MG/ML
40 INJECTION SUBCUTANEOUS EVERY 12 HOURS
Status: DISCONTINUED | OUTPATIENT
Start: 2025-08-19 | End: 2025-08-21

## 2025-08-19 RX ORDER — BENZONATATE 100 MG/1
200 CAPSULE ORAL 3 TIMES DAILY PRN
Status: DISCONTINUED | OUTPATIENT
Start: 2025-08-19 | End: 2025-08-21

## 2025-08-20 ENCOUNTER — APPOINTMENT (OUTPATIENT)
Dept: CV DIAGNOSTICS | Facility: HOSPITAL | Age: 80
End: 2025-08-20
Attending: HOSPITALIST

## 2025-08-20 LAB
ANION GAP SERPL CALC-SCNC: 8 MMOL/L (ref 0–18)
BASOPHILS # BLD AUTO: 0.02 X10(3) UL (ref 0–0.2)
BASOPHILS NFR BLD AUTO: 0.2 %
BUN BLD-MCNC: 13 MG/DL (ref 9–23)
BUN/CREAT SERPL: 19.7 (ref 10–20)
CALCIUM BLD-MCNC: 9.2 MG/DL (ref 8.7–10.4)
CHLORIDE SERPL-SCNC: 103 MMOL/L (ref 98–112)
CO2 SERPL-SCNC: 28 MMOL/L (ref 21–32)
CREAT BLD-MCNC: 0.66 MG/DL (ref 0.55–1.02)
DEPRECATED HBV CORE AB SER IA-ACNC: 36 NG/ML (ref 50–306)
DEPRECATED RDW RBC AUTO: 50.9 FL (ref 35.1–46.3)
EGFRCR SERPLBLD CKD-EPI 2021: 88 ML/MIN/1.73M2 (ref 60–?)
EOSINOPHIL # BLD AUTO: 0.01 X10(3) UL (ref 0–0.7)
EOSINOPHIL NFR BLD AUTO: 0.1 %
ERYTHROCYTE [DISTWIDTH] IN BLOOD BY AUTOMATED COUNT: 20.6 % (ref 11–15)
GLUCOSE BLD-MCNC: 139 MG/DL (ref 70–99)
HCT VFR BLD AUTO: 33.2 % (ref 35–48)
HGB BLD-MCNC: 9.7 G/DL (ref 12–16)
IMM GRANULOCYTES # BLD AUTO: 0.07 X10(3) UL (ref 0–1)
IMM GRANULOCYTES NFR BLD: 0.6 %
IRON SATN MFR SERPL: 4 % (ref 15–50)
IRON SERPL-MCNC: 17 UG/DL (ref 50–170)
LYMPHOCYTES # BLD AUTO: 1.36 X10(3) UL (ref 1–4)
LYMPHOCYTES NFR BLD AUTO: 11.8 %
MAGNESIUM SERPL-MCNC: 2.2 MG/DL (ref 1.6–2.6)
MAGNESIUM SERPL-MCNC: 2.3 MG/DL (ref 1.6–2.6)
MCH RBC QN AUTO: 20.5 PG (ref 26–34)
MCHC RBC AUTO-ENTMCNC: 29.2 G/DL (ref 31–37)
MCV RBC AUTO: 70.2 FL (ref 80–100)
MONOCYTES # BLD AUTO: 0.29 X10(3) UL (ref 0.1–1)
MONOCYTES NFR BLD AUTO: 2.5 %
NEUTROPHILS # BLD AUTO: 9.74 X10 (3) UL (ref 1.5–7.7)
NEUTROPHILS # BLD AUTO: 9.74 X10(3) UL (ref 1.5–7.7)
NEUTROPHILS NFR BLD AUTO: 84.8 %
OSMOLALITY SERPL CALC.SUM OF ELEC: 290 MOSM/KG (ref 275–295)
PLATELET # BLD AUTO: 425 10(3)UL (ref 150–450)
POTASSIUM SERPL-SCNC: 4 MMOL/L (ref 3.5–5.1)
RBC # BLD AUTO: 4.73 X10(6)UL (ref 3.8–5.3)
SODIUM SERPL-SCNC: 139 MMOL/L (ref 136–145)
TOTAL IRON BINDING CAPACITY: 399 UG/DL (ref 250–425)
TRANSFERRIN SERPL-MCNC: 308 MG/DL (ref 250–380)
WBC # BLD AUTO: 11.5 X10(3) UL (ref 4–11)

## 2025-08-20 PROCEDURE — 93306 TTE W/DOPPLER COMPLETE: CPT | Performed by: HOSPITALIST

## 2025-08-20 PROCEDURE — 99232 SBSQ HOSP IP/OBS MODERATE 35: CPT | Performed by: INTERNAL MEDICINE

## 2025-08-20 PROCEDURE — 99233 SBSQ HOSP IP/OBS HIGH 50: CPT | Performed by: HOSPITALIST

## 2025-08-20 RX ORDER — METHYLPREDNISOLONE SODIUM SUCCINATE 40 MG/ML
40 INJECTION INTRAMUSCULAR; INTRAVENOUS EVERY 12 HOURS
Status: DISCONTINUED | OUTPATIENT
Start: 2025-08-20 | End: 2025-08-21

## 2025-08-21 VITALS
WEIGHT: 205.38 LBS | RESPIRATION RATE: 20 BRPM | TEMPERATURE: 98 F | SYSTOLIC BLOOD PRESSURE: 126 MMHG | OXYGEN SATURATION: 94 % | DIASTOLIC BLOOD PRESSURE: 64 MMHG | HEART RATE: 66 BPM | BODY MASS INDEX: 39 KG/M2

## 2025-08-21 DIAGNOSIS — Z86.16 HISTORY OF COVID-19: ICD-10-CM

## 2025-08-21 LAB
ANION GAP SERPL CALC-SCNC: 8 MMOL/L (ref 0–18)
BASOPHILS # BLD AUTO: 0.04 X10(3) UL (ref 0–0.2)
BASOPHILS NFR BLD AUTO: 0.2 %
BUN BLD-MCNC: 21 MG/DL (ref 9–23)
BUN/CREAT SERPL: 30 (ref 10–20)
CALCIUM BLD-MCNC: 9.6 MG/DL (ref 8.7–10.4)
CHLORIDE SERPL-SCNC: 99 MMOL/L (ref 98–112)
CO2 SERPL-SCNC: 30 MMOL/L (ref 21–32)
CREAT BLD-MCNC: 0.7 MG/DL (ref 0.55–1.02)
DEPRECATED RDW RBC AUTO: 50.1 FL (ref 35.1–46.3)
EGFRCR SERPLBLD CKD-EPI 2021: 86 ML/MIN/1.73M2 (ref 60–?)
EOSINOPHIL # BLD AUTO: 0 X10(3) UL (ref 0–0.7)
EOSINOPHIL NFR BLD AUTO: 0 %
ERYTHROCYTE [DISTWIDTH] IN BLOOD BY AUTOMATED COUNT: 20.7 % (ref 11–15)
GLUCOSE BLD-MCNC: 161 MG/DL (ref 70–99)
HCT VFR BLD AUTO: 35.7 % (ref 35–48)
HGB BLD-MCNC: 10.3 G/DL (ref 12–16)
IMM GRANULOCYTES # BLD AUTO: 0.1 X10(3) UL (ref 0–1)
IMM GRANULOCYTES NFR BLD: 0.6 %
LYMPHOCYTES # BLD AUTO: 1.81 X10(3) UL (ref 1–4)
LYMPHOCYTES NFR BLD AUTO: 11.2 %
MCH RBC QN AUTO: 20.2 PG (ref 26–34)
MCHC RBC AUTO-ENTMCNC: 28.9 G/DL (ref 31–37)
MCV RBC AUTO: 69.9 FL (ref 80–100)
MONOCYTES # BLD AUTO: 0.65 X10(3) UL (ref 0.1–1)
MONOCYTES NFR BLD AUTO: 4 %
NEUTROPHILS # BLD AUTO: 13.59 X10 (3) UL (ref 1.5–7.7)
NEUTROPHILS # BLD AUTO: 13.59 X10(3) UL (ref 1.5–7.7)
NEUTROPHILS NFR BLD AUTO: 84 %
OSMOLALITY SERPL CALC.SUM OF ELEC: 290 MOSM/KG (ref 275–295)
PLATELET # BLD AUTO: 486 10(3)UL (ref 150–450)
POTASSIUM SERPL-SCNC: 4 MMOL/L (ref 3.5–5.1)
RBC # BLD AUTO: 5.11 X10(6)UL (ref 3.8–5.3)
SODIUM SERPL-SCNC: 137 MMOL/L (ref 136–145)
WBC # BLD AUTO: 16.2 X10(3) UL (ref 4–11)

## 2025-08-21 PROCEDURE — 99232 SBSQ HOSP IP/OBS MODERATE 35: CPT | Performed by: INTERNAL MEDICINE

## 2025-08-21 PROCEDURE — 99239 HOSP IP/OBS DSCHRG MGMT >30: CPT | Performed by: HOSPITALIST

## 2025-08-21 RX ORDER — PREDNISONE 20 MG/1
40 TABLET ORAL
Status: DISCONTINUED | OUTPATIENT
Start: 2025-08-22 | End: 2025-08-21

## 2025-08-21 RX ORDER — PREDNISONE 20 MG/1
TABLET ORAL
Qty: 10 TABLET | Refills: 0 | Status: SHIPPED | OUTPATIENT
Start: 2025-08-21 | End: 2025-08-29

## 2025-08-21 RX ORDER — FUROSEMIDE 40 MG/1
40 TABLET ORAL
Qty: 60 TABLET | Refills: 0 | Status: SHIPPED | OUTPATIENT
Start: 2025-08-21

## 2025-08-21 RX ORDER — FERROUS SULFATE 325(65) MG
325 TABLET, DELAYED RELEASE (ENTERIC COATED) ORAL
Qty: 30 TABLET | Refills: 0 | Status: SHIPPED | OUTPATIENT
Start: 2025-08-21

## 2025-08-21 RX ORDER — FUROSEMIDE 40 MG/1
40 TABLET ORAL
Status: DISCONTINUED | OUTPATIENT
Start: 2025-08-21 | End: 2025-08-21

## 2025-08-22 ENCOUNTER — PATIENT OUTREACH (OUTPATIENT)
Age: 80
End: 2025-08-22

## 2025-08-22 ENCOUNTER — PATIENT MESSAGE (OUTPATIENT)
Dept: INTERNAL MEDICINE CLINIC | Facility: CLINIC | Age: 80
End: 2025-08-22

## 2025-08-22 ENCOUNTER — PATIENT OUTREACH (OUTPATIENT)
Dept: CASE MANAGEMENT | Age: 80
End: 2025-08-22

## 2025-08-22 RX ORDER — TEMAZEPAM 7.5 MG/1
7.5 CAPSULE ORAL NIGHTLY PRN
Qty: 30 CAPSULE | Refills: 0 | Status: SHIPPED | OUTPATIENT
Start: 2025-08-22

## 2025-08-23 ENCOUNTER — APPOINTMENT (OUTPATIENT)
Dept: CT IMAGING | Facility: HOSPITAL | Age: 80
End: 2025-08-23
Attending: EMERGENCY MEDICINE

## 2025-08-23 ENCOUNTER — HOSPITAL ENCOUNTER (EMERGENCY)
Facility: HOSPITAL | Age: 80
Discharge: HOME | End: 2025-08-23
Attending: EMERGENCY MEDICINE

## 2025-08-23 ENCOUNTER — PATIENT MESSAGE (OUTPATIENT)
Dept: INTERNAL MEDICINE CLINIC | Facility: CLINIC | Age: 80
End: 2025-08-23

## 2025-08-23 ENCOUNTER — APPOINTMENT (OUTPATIENT)
Dept: GENERAL RADIOLOGY | Facility: HOSPITAL | Age: 80
End: 2025-08-23
Attending: EMERGENCY MEDICINE

## 2025-08-23 VITALS
HEART RATE: 64 BPM | DIASTOLIC BLOOD PRESSURE: 58 MMHG | RESPIRATION RATE: 16 BRPM | OXYGEN SATURATION: 98 % | SYSTOLIC BLOOD PRESSURE: 120 MMHG | TEMPERATURE: 97 F

## 2025-08-23 DIAGNOSIS — S02.2XXA CLOSED FRACTURE OF NASAL BONE, INITIAL ENCOUNTER: ICD-10-CM

## 2025-08-23 DIAGNOSIS — S60.221A CONTUSION OF RIGHT HAND, INITIAL ENCOUNTER: ICD-10-CM

## 2025-08-23 DIAGNOSIS — R82.81 PYURIA: ICD-10-CM
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
## 2025-08-23 DIAGNOSIS — M54.2 NECK PAIN: ICD-10-CM

## 2025-08-23 LAB
ANION GAP SERPL CALC-SCNC: 7 MMOL/L (ref 0–18)
BASOPHILS # BLD AUTO: 0.1 X10(3) UL (ref 0–0.2)
BASOPHILS NFR BLD AUTO: 0.5 %
BILIRUB UR QL: NEGATIVE
BUN BLD-MCNC: 27 MG/DL (ref 9–23)
BUN/CREAT SERPL: 34.2 (ref 10–20)
CALCIUM BLD-MCNC: 9.7 MG/DL (ref 8.7–10.4)
CHLORIDE SERPL-SCNC: 101 MMOL/L (ref 98–112)
CK SERPL-CCNC: 78 U/L (ref 34–145)
CLARITY UR: CLEAR
CO2 SERPL-SCNC: 33 MMOL/L (ref 21–32)
COLOR UR: YELLOW
CREAT BLD-MCNC: 0.79 MG/DL (ref 0.55–1.02)
DEPRECATED RDW RBC AUTO: 51 FL (ref 35.1–46.3)
EGFRCR SERPLBLD CKD-EPI 2021: 75 ML/MIN/1.73M2 (ref 60–?)
EOSINOPHIL # BLD AUTO: 0.25 X10(3) UL (ref 0–0.7)
EOSINOPHIL NFR BLD AUTO: 1.3 %
ERYTHROCYTE [DISTWIDTH] IN BLOOD BY AUTOMATED COUNT: 22 % (ref 11–15)
GLUCOSE BLD-MCNC: 112 MG/DL (ref 70–99)
GLUCOSE UR-MCNC: NORMAL MG/DL
HCT VFR BLD AUTO: 37.5 % (ref 35–48)
HGB BLD-MCNC: 11 G/DL (ref 12–16)
HGB UR QL STRIP.AUTO: NEGATIVE
IMM GRANULOCYTES # BLD AUTO: 0.22 X10(3) UL (ref 0–1)
IMM GRANULOCYTES NFR BLD: 1.1 %
KETONES UR-MCNC: NEGATIVE MG/DL
LEUKOCYTE ESTERASE UR QL STRIP.AUTO: 75
LYMPHOCYTES # BLD AUTO: 3.34 X10(3) UL (ref 1–4)
LYMPHOCYTES NFR BLD AUTO: 16.7 %
MCH RBC QN AUTO: 20.3 PG (ref 26–34)
MCHC RBC AUTO-ENTMCNC: 29.3 G/DL (ref 31–37)
MCV RBC AUTO: 69.3 FL (ref 80–100)
MONOCYTES # BLD AUTO: 1.94 X10(3) UL (ref 0.1–1)
MONOCYTES NFR BLD AUTO: 9.7 %
NEUTROPHILS # BLD AUTO: 14.1 X10 (3) UL (ref 1.5–7.7)
NEUTROPHILS # BLD AUTO: 14.1 X10(3) UL (ref 1.5–7.7)
NEUTROPHILS NFR BLD AUTO: 70.7 %
NITRITE UR QL STRIP.AUTO: NEGATIVE
OSMOLALITY SERPL CALC.SUM OF ELEC: 298 MOSM/KG (ref 275–295)
PH UR: 5.5 (ref 5–8)
PLATELET # BLD AUTO: 464 10(3)UL (ref 150–450)
POTASSIUM SERPL-SCNC: 3.7 MMOL/L (ref 3.5–5.1)
PROT UR-MCNC: NEGATIVE MG/DL
RBC # BLD AUTO: 5.41 X10(6)UL (ref 3.8–5.3)
SODIUM SERPL-SCNC: 141 MMOL/L (ref 136–145)
SP GR UR STRIP: 1.03 (ref 1–1.03)
UROBILINOGEN UR STRIP-ACNC: NORMAL
WBC # BLD AUTO: 20 X10(3) UL (ref 4–11)

## 2025-08-23 PROCEDURE — 74176 CT ABD & PELVIS W/O CONTRAST: CPT | Performed by: EMERGENCY MEDICINE

## 2025-08-23 PROCEDURE — 71045 X-RAY EXAM CHEST 1 VIEW: CPT | Performed by: EMERGENCY MEDICINE

## 2025-08-23 PROCEDURE — 70450 CT HEAD/BRAIN W/O DYE: CPT | Performed by: EMERGENCY MEDICINE

## 2025-08-23 PROCEDURE — 87186 SC STD MICRODIL/AGAR DIL: CPT | Performed by: EMERGENCY MEDICINE

## 2025-08-23 PROCEDURE — 87086 URINE CULTURE/COLONY COUNT: CPT | Performed by: EMERGENCY MEDICINE

## 2025-08-23 PROCEDURE — 80048 BASIC METABOLIC PNL TOTAL CA: CPT | Performed by: EMERGENCY MEDICINE

## 2025-08-23 PROCEDURE — 73560 X-RAY EXAM OF KNEE 1 OR 2: CPT | Performed by: EMERGENCY MEDICINE

## 2025-08-23 PROCEDURE — 82550 ASSAY OF CK (CPK): CPT | Performed by: EMERGENCY MEDICINE

## 2025-08-23 PROCEDURE — 72125 CT NECK SPINE W/O DYE: CPT | Performed by: EMERGENCY MEDICINE

## 2025-08-23 PROCEDURE — 85025 COMPLETE CBC W/AUTO DIFF WBC: CPT | Performed by: EMERGENCY MEDICINE

## 2025-08-23 PROCEDURE — 70486 CT MAXILLOFACIAL W/O DYE: CPT | Performed by: EMERGENCY MEDICINE

## 2025-08-23 PROCEDURE — 81001 URINALYSIS AUTO W/SCOPE: CPT | Performed by: EMERGENCY MEDICINE

## 2025-08-23 PROCEDURE — 73130 X-RAY EXAM OF HAND: CPT | Performed by: EMERGENCY MEDICINE

## 2025-08-23 PROCEDURE — 87077 CULTURE AEROBIC IDENTIFY: CPT | Performed by: EMERGENCY MEDICINE

## 2025-08-23 RX ORDER — POLYETHYLENE GLYCOL 3350 17 G/17G
17 POWDER, FOR SOLUTION ORAL DAILY
Qty: 5 PACKET | Refills: 0 | Status: SHIPPED | OUTPATIENT
Start: 2025-08-23 | End: 2025-08-28

## 2025-08-23 RX ORDER — MORPHINE SULFATE 2 MG/ML
2 INJECTION, SOLUTION INTRAMUSCULAR; INTRAVENOUS ONCE
Status: COMPLETED | OUTPATIENT
Start: 2025-08-23 | End: 2025-08-23

## 2025-08-23 RX ORDER — MUPIROCIN 2 %
1 OINTMENT (GRAM) TOPICAL 3 TIMES DAILY
Qty: 15 G | Refills: 0 | Status: SHIPPED | OUTPATIENT
Start: 2025-08-23 | End: 2025-09-06

## 2025-08-23 RX ORDER — CEPHALEXIN 500 MG/1
500 CAPSULE ORAL 2 TIMES DAILY
Qty: 14 CAPSULE | Refills: 0 | Status: SHIPPED | OUTPATIENT
Start: 2025-08-23 | End: 2025-08-30

## 2025-08-23 RX ORDER — HYDROCODONE BITARTRATE AND ACETAMINOPHEN 5; 325 MG/1; MG/1
1 TABLET ORAL EVERY 12 HOURS PRN
Qty: 6 TABLET | Refills: 0 | Status: SHIPPED | OUTPATIENT
Start: 2025-08-23 | End: 2025-08-26

## 2025-08-25 ENCOUNTER — TELEPHONE (OUTPATIENT)
Dept: PULMONOLOGY | Facility: CLINIC | Age: 80
End: 2025-08-25

## 2025-08-25 RX ORDER — DULOXETIN HYDROCHLORIDE 30 MG/1
30 CAPSULE, DELAYED RELEASE ORAL DAILY
Qty: 90 CAPSULE | Refills: 0 | Status: SHIPPED | OUTPATIENT
Start: 2025-08-25

## 2025-08-28 ENCOUNTER — OFFICE VISIT (OUTPATIENT)
Dept: INTERNAL MEDICINE CLINIC | Facility: CLINIC | Age: 80
End: 2025-08-28

## 2025-08-28 VITALS
WEIGHT: 215 LBS | SYSTOLIC BLOOD PRESSURE: 132 MMHG | DIASTOLIC BLOOD PRESSURE: 82 MMHG | HEART RATE: 88 BPM | RESPIRATION RATE: 18 BRPM | OXYGEN SATURATION: 91 % | HEIGHT: 61 IN | TEMPERATURE: 98 F | BODY MASS INDEX: 40.59 KG/M2

## 2025-08-28 DIAGNOSIS — G89.29 CHRONIC PAIN OF LEFT KNEE: ICD-10-CM

## 2025-08-28 DIAGNOSIS — D64.9 ANEMIA, UNSPECIFIED TYPE: ICD-10-CM

## 2025-08-28 DIAGNOSIS — J43.9 PULMONARY EMPHYSEMA, UNSPECIFIED EMPHYSEMA TYPE (HCC): ICD-10-CM

## 2025-08-28 DIAGNOSIS — M54.2 NECK PAIN: ICD-10-CM

## 2025-08-28 DIAGNOSIS — R09.02 HYPOXIA: ICD-10-CM

## 2025-08-28 DIAGNOSIS — Z09 HOSPITAL DISCHARGE FOLLOW-UP: Primary | ICD-10-CM

## 2025-08-28 DIAGNOSIS — M25.562 CHRONIC PAIN OF LEFT KNEE: ICD-10-CM

## 2025-08-28 PROCEDURE — 1126F AMNT PAIN NOTED NONE PRSNT: CPT | Performed by: INTERNAL MEDICINE

## 2025-08-28 PROCEDURE — 1160F RVW MEDS BY RX/DR IN RCRD: CPT | Performed by: INTERNAL MEDICINE

## 2025-08-28 PROCEDURE — 1111F DSCHRG MED/CURRENT MED MERGE: CPT | Performed by: INTERNAL MEDICINE

## 2025-08-28 PROCEDURE — 1159F MED LIST DOCD IN RCRD: CPT | Performed by: INTERNAL MEDICINE

## 2025-08-28 PROCEDURE — 99214 OFFICE O/P EST MOD 30 MIN: CPT | Performed by: INTERNAL MEDICINE

## 2025-08-28 RX ORDER — NICOTINE 21 MG/24HR
1 PATCH, TRANSDERMAL 24 HOURS TRANSDERMAL EVERY 24 HOURS
Qty: 90 PATCH | Refills: 0 | Status: SHIPPED | OUTPATIENT
Start: 2025-08-28

## 2025-08-28 RX ORDER — DULOXETIN HYDROCHLORIDE 30 MG/1
30 CAPSULE, DELAYED RELEASE ORAL DAILY
Qty: 90 CAPSULE | Refills: 0 | Status: SHIPPED | OUTPATIENT
Start: 2025-08-28

## 2025-08-28 RX ORDER — DULOXETIN HYDROCHLORIDE 60 MG/1
60 CAPSULE, DELAYED RELEASE ORAL EVERY EVENING
Qty: 90 CAPSULE | Refills: 3 | Status: SHIPPED | OUTPATIENT
Start: 2025-08-28

## (undated) DEVICE — COVER STND 54X23IN MAYO REINF

## (undated) DEVICE — KIT ELECTROTHERAPY COOLIEF KNE

## (undated) DEVICE — 6 ML SYRINGE LUER-LOCK TIP: Brand: MONOJECT

## (undated) DEVICE — NEEDLE HPO 18GA 1.5IN ECLPS

## (undated) DEVICE — 3M™ DURAPORE™ SURGICAL TAPE 2 INCHES X 10YARDS (5.0CM X 9.1M) 6ROLLS/CARTON 10CARTONS/CASE 1538-2: Brand: 3M™ DURAPORE™

## (undated) DEVICE — PROVE COVER: Brand: UNBRANDED

## (undated) DEVICE — GAMMEX® PI HYBRID SIZE 7, STERILE POWDER-FREE SURGICAL GLOVE, POLYISOPRENE AND NEOPRENE BLEND: Brand: GAMMEX

## (undated) DEVICE — PENCAN® 22 GA. X 3-1/2 (90 MM) SPINAL NEEDLE: Brand: PENCAN®

## (undated) DEVICE — APPLICATOR CHLORAPREP 10.5ML

## (undated) DEVICE — 3 ML SYRINGE LUER-LOCK TIP: Brand: MONOJECT

## (undated) DEVICE — PAD GROUNDING ELECTROSURGICAL

## (undated) DEVICE — PEN: MARKING STD PT 100/CS: Brand: MEDICAL ACTION INDUSTRIES

## (undated) DEVICE — STANDARD HYPODERMIC NEEDLE,POLYPROPYLENE HUB: Brand: MONOJECT

## (undated) DEVICE — TOWEL SURG OR 17X30IN BLUE

## (undated) DEVICE — DRAPE SRG 26X15IN UTL TPE STRL

## (undated) NOTE — LETTER
Date & Time: 11/8/2022, 10:54 AM  Patient: Roni Caraballo  Encounter Provider(s):    Romeo Fatima MD       To Whom It May Concern:    Roni Caraballo was seen and treated in our department on 11/8/2022. She {Return to school/sport/work:[id number]}.     If you have any questions or concerns, please do not hesitate to call.        _____________________________  Physician/APC Signature

## (undated) NOTE — LETTER
5/9/2023      Le Redmond MD  Physical Medicine and Rehabilitation  [address] [facility], [address] [place] [address]  Dept: [phone]  Dept Fax: [phone]        RE: Consultation for Jagdeep Leiva        Dear Nicole Bethea MD,    Thank you very much for the opportunity to see your patient. Attached please find a summary from your patient's recent visit. I appreciate the chance to take care of your patient with you. Please feel free to call me with any questions or concerns. Sincerely,        Esther Moses.  Jennifer Redmond MD  Electronically Signed on 5/9/2023

## (undated) NOTE — LETTER
6/10/2024      Marcelino Chong MD  Physical Medicine and Rehabilitation  [address] [place] [address], [address]  [facility] [address]  Dept: [phone]  Dept Fax: [phone]        RE: Consultation for Fidelia Villar        Dear Jeff Anthony D'Amico, DO,    Thank you very much for the opportunity to see your patient.  Attached please find a summary from your patient's recent visit.     I appreciate the chance to take care of your patient with you.  Please feel free to call me with any questions or concerns.    Sincerely,        Marcelino Chong MD  Electronically Signed on 6/10/2024

## (undated) NOTE — LETTER
AUTHORIZATION FOR SURGICAL OPERATION OR OTHER PROCEDURE    1. I hereby authorize Dr. Marcelino Chong and the [facility] staff assigned to my case to perform the following operation and/or procedure at the [facility]:    Right shoulder injection    2.  My physician has explained the nature and purpose of the operation or other procedure, possible alternative methods of treatment, the risks involved, and the possibility of complication to me.  I acknowledge that no guarantee has been made as to the result that may be obtained.  3.  I recognize that, during the course of this operation, or other procedure, unforseen conditions may necessitate additional or different procedure than those listed above.  I, therefore, further authorize and request that the above named physician, his/her physician assistants or designees perform such procedures as are, in his/her professional opinion, necessary and desirable.  4.  Any tissue or organs removed in the operation or other procedure may be disposed of by and at the discretion of the [facility] staff and [facility].  5.  I understand that in the event of a medical emergency, I will be transported by local paramedics to [facility] or other hospital emergency department.  6.  I certify that I have read and fully understand the above consent to operation and/or other procedure.    7.  I acknowledge that my physician has explained sedation/analgesia administration to me including the risks and benefits.  I consent to the administration of sedation/analgesia as may be necessary or desirable in the judgement of my physician.    Witness signature: ___________________________________________________ Date:  ______/______/_____                    Time:  ________ A.M.  P.M.       Patient Name: Fidelia Villar  7/15/1945  [id number]     Patient signature:  ___________________________________________________    Statement of Physician  My signature below affirms  that prior to the time of the procedure, I have explained to the patient and/or his/her guardian, the risks and benefits involved in the proposed treatment and any reasonable alternative to the proposed treatment.  I have also explained the risks and benefits involved in the refusal of the proposed treatment and have answered the patient's questions.                        Date:  ______/______/_______  Provider                      Signature:  __________________________________________________________       Time:  ___________ A.M    P.M.

## (undated) NOTE — LETTER
Notifier: [employer]       Patient Name: Fidelia Villar       Identification Number: [id number]                          Advance Beneficiary Notice of Noncoverage (ABN)   NOTE:  If Medicare doesn’t pay for D. Items/service(s) below, you may have to pay.  Medicare does not pay for everything, even some care that you or your health care provider have good reason to think you need. We expect Medicare may not pay for the D. items/service(s) below.  Items or Services  Right shoulder injection Reason Medicare May Not Pay: Estimated Cost   __EKG ($129.00)  __Pap smear ($48.23) __Pelvic/Breast ($65.00)  __ Ear Irrigation ($149)  _x_ Injection(s)  ___ Tdap ($70)       ___ Meningitis ($206)   __Prevnar ($285)  ___ Td ($51)            ___Shingrix ($215)        __Prevnar 20 ($309)  ___ Hep A ($156)   ___Prolia ($1827.00)     __ Xiaflex ($              )   ___ Hep B ($167)      __Pneumovax ($155)                                            ___ Vaccine Administration ($31)   __ Medicare does not cover this service      __ Medicare may not pay for this   item/service for your condition     __ Medicare may not pay for this item/service as often as this   $1200.00     WHAT YOU NEED TO DO NOW:  Read this notice, so you can make an informed decision about your care.  Ask us any questions that you may have after you finish reading.  Choose an option below about whether to receive the D. item/service(s)  listed above.  Note: If you choose Option 1 or 2, we may help you to use any other insurance that you might have, but Medicare cannot require us to do this.  OPTIONS: Check only one box.  We cannot choose a box for you.   OPTION 1. I want the D. item/service(s) listed above. You may ask to be paid now, but I also want Medicare billed for an official decision on payment, which is sent to me on a Medicare Summary Notice (MSN). I understand that if Medicare doesn’t pay, I am responsible for payment, but I can appeal to Medicare by  following the directions on the MSN. If Medicare does pay, you will refund any payments I made to you, less co-pays or deductibles.  OPTION 2. I want the D. item/service(s) listed above, but do not bill Medicare. You may ask to be paid now as I am responsible for payment. I cannot appeal if Medicare is not billed.  OPTION 3. I don't want the D. item/service(s) listed above. I understand with this choice I am not responsible for payment, and I cannot appeal to see if Medicare would pay.    H. Additional Information:    This notice gives our opinion, not an official Medicare decision. If you have other questions on this notice or Medicare billing, call 1-800-MEDICARE (1-817.351.8269/TTY: 1-227.150.9460). Signing below means that you have received and understand this notice. You also receive a copy.  Signature: Date:       You have the right to get Medicare information in an accessible format, like large print, Braille, or audio. You also have the right to file a complaint if you feel you’ve been discriminated against. Visit Medicare.gov/about- us/pkrcxiqcxrxod-jblpxtimpqrdjavey-agcknv.  According to the Paperwork Reduction Act of 1995, no persons are required to respond to a collection of information unless it displays a valid OMB control number. The valid OMB control number for this information collection is 5358-2095. The time required to complete this information collection is estimated to average 7 minutes per response, including the time to review instructions, search existing data resources, gather the data needed, and complete and review the information collection. If you have comments concerning the accuracy of the time estimate or suggestions for improving this form, please write to: CMS, [facility] Security     Timur Attn: SARA Reports Clearance Officer, [place], [place] [address].  Form CMS-R-131 (Exp. 1/31/2026) Form Approved OMB No. [id number]

## (undated) NOTE — LETTER
11/20/2024      Marcelino Chong MD  Physical Medicine and Rehabilitation  [address] [place] [address], [address]  [facility] [address]  Dept: [phone]  Dept Fax: [phone]        RE: Consultation for Fidelia Villar        Dear Jeff Anthony D'Amico, DO,    Thank you very much for the opportunity to see your patient.  Attached please find a summary from your patient's recent visit.     I appreciate the chance to take care of your patient with you.  Please feel free to call me with any questions or concerns.    Sincerely,        Marcelino Chong MD  Electronically Signed on 11/20/2024

## (undated) NOTE — LETTER
7/30/2024      Marcelino Chong MD  Physical Medicine and Rehabilitation  [address] [place] [address], [address]  [facility] [address]  Dept: [phone]  Dept Fax: [phone]        RE: Consultation for Fidelia Villar        Dear Jeff Anthony D'Amico, DO,    Thank you very much for the opportunity to see your patient.  Attached please find a summary from your patient's recent visit.     I appreciate the chance to take care of your patient with you.  Please feel free to call me with any questions or concerns.    Sincerely,        Marcelino Chong MD  Electronically Signed on 7/30/2024

## (undated) NOTE — LETTER
[address], [place]  Authorization for Invasive Procedures  1.  I hereby authorize Dr. Robert Villeda , my physician and whomever may be designated as the doctor's assistant, to perform the following operation and/or procedure:  Cardiac c 5. I consent to the photographing of the operations or procedures to be performed for the purposes of advancing medicine, science, and/or education, provided my identity is not revealed.  If the procedure has been videotaped, the physician/surgeon will obta __________ Time: ___________    Statement of Physician  My signature below affirms that prior to the time of the procedure, I have explained to the patient and/or her legal representative, the risks and benefits involved in the proposed treatment and any r

## (undated) NOTE — LETTER
4/16/2025      Marcelino Chong MD  Physical Medicine and Rehabilitation  [address] [place] [address], [address]  [facility] [address]  Dept: [phone]  Dept Fax: [phone]        RE: Consultation for Fidelia Villar        Dear Jeff Anthony D'Amico, DO,    Thank you very much for the opportunity to see your patient.  Attached please find a summary from your patient's recent visit.     I appreciate the chance to take care of your patient with you.  Please feel free to call me with any questions or concerns.    Sincerely,        Marcelino Chong MD  Electronically Signed on 4/16/2025

## (undated) NOTE — LETTER
24        To Whom It May Concern:      Fidelia Villar  :  7/15/1945    []  Patient has been cleared to hold Plavix for 7 days prior to Left L4 and L5 transforaminal epidural steroid injection.  Holding the medication(s) may put the patient at an increased risk for stroke, heart attack, or neurologic or cardiac events.    []  Patient has NOT been cleared to hold Plavix for procedure.        X_________________________________________  (Provider signature)                                     (Date)      Please make a selection, sign and fax this letter back to our office    If this office may be of further assistance, please do not hesitate to contact us.      Sincerely,    Marcelino Chong MD    Phone #: [phone]  Fax #: [phone]

## (undated) NOTE — LETTER
4/8/2024      Marcelino Chong MD  Physical Medicine and Rehabilitation  [address] [place] [address], [address]  [facility] [address]  Dept: [phone]  Dept Fax: [phone]        RE: Consultation for Fidelia Villar        Dear Jeff Anthony D'Amico, DO,    Thank you very much for the opportunity to see your patient.  Attached please find a summary from your patient's recent visit.     I appreciate the chance to take care of your patient with you.  Please feel free to call me with any questions or concerns.    Sincerely,        Marcelino Chong MD  Electronically Signed on 4/8/2024

## (undated) NOTE — LETTER
October 18, 2023     Fidelia Villar  [address] [place] [id number]      Dear Satish Luque:    The test results all the blood work ordered during your neurology office visit looks normal.  We were checking her monoclonal proteins, vitamin E,copper, vitamin B12, vitamin B1 and vitamin B6 levels. If you have any questions or concerns, please don't hesitate to call.      [address], DO   Neurology

## (undated) NOTE — Clinical Note
Hello, this patient wanted her daughter-in-law called about my recommendations re: her cervicogenic headaches - as stated in my note, I recommended occipital nerve blocks; physical therapy to help with neck and gait; acupuncture; continued physiatry follow up; perhaps increasing Gabapentin.  Thank you, FARHAD

## (undated) NOTE — LETTER
6/22/2023      Mar Sutherland MD  Physical Medicine and Rehabilitation  [address] [facility], [address] [place] [address]  Dept: [phone]  Dept Fax: [phone]        RE: Consultation for Roni Caraballo        Dear Josr King MD,    Thank you very much for the opportunity to see your patient. Attached please find a summary from your patient's recent visit. I appreciate the chance to take care of your patient with you. Please feel free to call me with any questions or concerns. Sincerely,        Wilbert Dalal.  Fuentes Sutherland MD  Electronically Signed on 6/22/2023